# Patient Record
Sex: FEMALE | Race: BLACK OR AFRICAN AMERICAN | NOT HISPANIC OR LATINO | Employment: OTHER | URBAN - METROPOLITAN AREA
[De-identification: names, ages, dates, MRNs, and addresses within clinical notes are randomized per-mention and may not be internally consistent; named-entity substitution may affect disease eponyms.]

---

## 2018-05-17 ENCOUNTER — OFFICE VISIT (OUTPATIENT)
Dept: CARDIOLOGY | Facility: CLINIC | Age: 68
End: 2018-05-17
Payer: MEDICARE

## 2018-05-17 ENCOUNTER — CLINICAL SUPPORT (OUTPATIENT)
Dept: CARDIOLOGY | Facility: CLINIC | Age: 68
End: 2018-05-17
Payer: MEDICARE

## 2018-05-17 VITALS
HEIGHT: 64 IN | WEIGHT: 138 LBS | DIASTOLIC BLOOD PRESSURE: 74 MMHG | SYSTOLIC BLOOD PRESSURE: 148 MMHG | HEART RATE: 72 BPM | BODY MASS INDEX: 23.56 KG/M2

## 2018-05-17 DIAGNOSIS — E11.59 CONTROLLED TYPE 2 DIABETES MELLITUS WITH OTHER CIRCULATORY COMPLICATION, WITHOUT LONG-TERM CURRENT USE OF INSULIN: ICD-10-CM

## 2018-05-17 DIAGNOSIS — R00.2 PALPITATION: ICD-10-CM

## 2018-05-17 DIAGNOSIS — E78.5 HYPERLIPIDEMIA, UNSPECIFIED HYPERLIPIDEMIA TYPE: ICD-10-CM

## 2018-05-17 DIAGNOSIS — I10 ESSENTIAL HYPERTENSION: ICD-10-CM

## 2018-05-17 DIAGNOSIS — I10 ESSENTIAL HYPERTENSION: Primary | ICD-10-CM

## 2018-05-17 PROCEDURE — 99999 PR PBB SHADOW E&M-NEW PATIENT-LVL III: CPT | Mod: PBBFAC,,, | Performed by: INTERNAL MEDICINE

## 2018-05-17 PROCEDURE — 99204 OFFICE O/P NEW MOD 45 MIN: CPT | Mod: S$GLB,,, | Performed by: INTERNAL MEDICINE

## 2018-05-17 PROCEDURE — 3077F SYST BP >= 140 MM HG: CPT | Mod: CPTII,S$GLB,, | Performed by: INTERNAL MEDICINE

## 2018-05-17 PROCEDURE — 3078F DIAST BP <80 MM HG: CPT | Mod: CPTII,S$GLB,, | Performed by: INTERNAL MEDICINE

## 2018-05-17 PROCEDURE — 93000 ELECTROCARDIOGRAM COMPLETE: CPT | Mod: S$GLB,,, | Performed by: INTERNAL MEDICINE

## 2018-05-17 PROCEDURE — 99499 UNLISTED E&M SERVICE: CPT | Mod: S$GLB,,, | Performed by: INTERNAL MEDICINE

## 2018-05-17 RX ORDER — AMLODIPINE BESYLATE 10 MG/1
10 TABLET ORAL DAILY
Refills: 2 | COMMUNITY
Start: 2018-05-09 | End: 2019-12-06 | Stop reason: SDUPTHER

## 2018-05-17 RX ORDER — PRAVASTATIN SODIUM 20 MG/1
20 TABLET ORAL NIGHTLY
COMMUNITY
Start: 2018-05-13 | End: 2019-05-01 | Stop reason: SDUPTHER

## 2018-05-17 RX ORDER — LOSARTAN POTASSIUM 100 MG/1
100 TABLET ORAL
Refills: 1 | COMMUNITY
Start: 2018-03-14 | End: 2019-12-03 | Stop reason: SDUPTHER

## 2018-05-17 RX ORDER — LABETALOL 200 MG/1
200 TABLET, FILM COATED ORAL 2 TIMES DAILY
COMMUNITY
Start: 2018-05-14 | End: 2019-12-03 | Stop reason: SDUPTHER

## 2018-05-17 RX ORDER — CHOLECALCIFEROL (VITAMIN D3) 125 MCG
5000 CAPSULE ORAL DAILY
Refills: 1 | COMMUNITY
Start: 2018-02-15

## 2018-05-17 RX ORDER — METFORMIN HYDROCHLORIDE 500 MG/1
500 TABLET, EXTENDED RELEASE ORAL 2 TIMES DAILY
Refills: 1 | COMMUNITY
Start: 2018-03-14 | End: 2019-12-03 | Stop reason: SDUPTHER

## 2018-05-17 NOTE — LETTER
May 17, 2018      Sanjiv Sinha MD  [address] [facility]  [place] [address]           [facility] - Cardiology  [address]  [facility] [address]  Phone: [phone]  Fax: [phone]          Patient: Maryjane Mcintyre   MR Number: [id number]   YOB: 1950   Date of Visit: 5/17/2018       Dear Dr. Sanjiv Sinha:    Thank you for referring Maryjane Mcintyre to me for evaluation. Attached you will find relevant portions of my assessment and plan of care.    If you have questions, please do not hesitate to call me. I look forward to following Maryjane Mcintyre along with you.    Sincerely,    Candelario Black MD    Enclosure  CC:  No Recipients    If you would like to receive this communication electronically, please contact [email or web address] or [phone] to request more information on [employer] access.    For providers and/or their staff who would like to refer a patient to [facility], please contact us through our one-stop-shop provider referral line, [facility], at [phone].    If you feel you have received this communication in error or would no longer like to receive these types of communications, please e-mail [email or web address]

## 2018-05-17 NOTE — PROGRESS NOTES
Subjective:   Patient ID:  Maryjane Mcintyre is a 67 y.o. female who presents for evaluation of Abnormal ECG (per Dr. Sanjiv Sinha)      66 yo female, referred for irregulart heat beats  PMH HNT and HLD. smoking for 50 yrs  And 1/2 ppd. No drinking.   Dizziness if getting out of the bed or positional change  Occasional skipping beats, no faint, chest pain, dyspnea.  ekg NSR  Increased Labetalol recently and not took med this morning        Past Medical History:   Diagnosis Date    Hyperlipidemia     Hypertension        History reviewed. No pertinent surgical history.    Social History   Substance Use Topics    Smoking status: Current Every Day Smoker     Packs/day: 1.00     Types: Cigarettes    Smokeless tobacco: Never Used    Alcohol use No       Family History   Problem Relation Age of Onset    Diabetes Father     Hypertension Father     Heart attack Sister     Stroke Sister        Review of Systems   Constitution: Negative for decreased appetite, diaphoresis, fever, weakness, malaise/fatigue and night sweats.   HENT: Negative for nosebleeds.    Eyes: Negative for blurred vision and double vision.   Cardiovascular: Positive for palpitations. Negative for chest pain, claudication, dyspnea on exertion, irregular heartbeat, leg swelling, near-syncope, orthopnea, paroxysmal nocturnal dyspnea and syncope.   Respiratory: Negative for cough, shortness of breath, sleep disturbances due to breathing, snoring, sputum production and wheezing.    Endocrine: Negative for cold intolerance and polyuria.   Hematologic/Lymphatic: Does not bruise/bleed easily.   Skin: Negative for rash.   Musculoskeletal: Negative for back pain, falls, joint pain, joint swelling and neck pain.   Gastrointestinal: Negative for abdominal pain, heartburn, nausea and vomiting.   Genitourinary: Negative for dysuria, frequency and hematuria.   Neurological: Negative for difficulty with concentration, dizziness, focal weakness, headaches,  light-headedness, numbness and seizures.   Psychiatric/Behavioral: Negative for depression, memory loss and substance abuse. The patient does not have insomnia.    Allergic/Immunologic: Negative for HIV exposure and hives.       Objective:   Physical Exam   Constitutional: She is oriented to person, place, and time. She appears well-nourished.   HENT:   Head: Normocephalic.   Eyes: Pupils are equal, round, and reactive to light.   Neck: Normal carotid pulses and no JVD present. Carotid bruit is not present. No thyromegaly present.   Cardiovascular: Normal rate, regular rhythm, normal heart sounds and normal pulses.   No extrasystoles are present. PMI is not displaced.  Exam reveals no gallop and no S3.    No murmur heard.  Pulmonary/Chest: Breath sounds normal. No stridor. No respiratory distress.   Abdominal: Soft. Bowel sounds are normal. There is no tenderness. There is no rebound.   Musculoskeletal: Normal range of motion.   Neurological: She is alert and oriented to person, place, and time.   Skin: Skin is intact. No rash noted.   Psychiatric: Her behavior is normal.       No results found for: CHOL  No results found for: HDL  No results found for: LDLCALC  No results found for: TRIG  No results found for: CHOLHDL    Chemistry    No results found for: NA, K, CL, CO2, BUN, CREATININE, GLU No results found for: CALCIUM, ALKPHOS, AST, ALT, BILITOT, ESTGFRAFRICA, EGFRNONAA       No results found for: TSH  No results found for: INR, PROTIME  No results found for: WBC, HGB, HCT, MCV, PLT  BNP  @LABRCNTIP(BNP,BNPTRIAGEBLO)@  CrCl cannot be calculated (No order found.).     Assessment:      1. Essential hypertension    2. Hyperlipidemia, unspecified hyperlipidemia type    3. Controlled type 2 diabetes mellitus with other circulatory complication, without long-term current use of insulin    4. 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  Recommend heart-healthy diet, weight control and regular exercise.  Bijan. Risk modification.   RTC in 1 yr    I have reviewed all pertinent labs and cardiac studies. Plans and recommendations have been formulated under my direct supervision. All questions answered and patient voiced understanding. Patient to continue current medications.

## 2018-06-04 ENCOUNTER — TELEPHONE (OUTPATIENT)
Dept: CARDIOLOGY | Facility: CLINIC | Age: 68
End: 2018-06-04

## 2018-06-04 NOTE — TELEPHONE ENCOUNTER
Spoke with pt who was looking to see if Zio was approved.  Status showing authorized.      ----- Message from Marita Vargas sent at 6/4/2018  9:15 AM CDT -----  Contact: pt daughter  Calling in regards to with questions about last appointment on May 17th and please advise.  [phone]

## 2018-06-07 ENCOUNTER — TELEPHONE (OUTPATIENT)
Dept: CARDIOLOGY | Facility: CLINIC | Age: 68
End: 2018-06-07

## 2018-06-07 NOTE — TELEPHONE ENCOUNTER
Spoke with pt daughter who was calling to see if there would be a copay for the ZIO.  Pt was advised to call insurance carrier.    ----- Message from Ember Zazueta sent at 6/7/2018 11:47 AM CDT -----  Contact: Taurus Joyce/Daughter  Call Taurus [phone] Regarding authorization and other questions before tomorrow's appt

## 2018-06-08 ENCOUNTER — CLINICAL SUPPORT (OUTPATIENT)
Dept: CARDIOLOGY | Facility: CLINIC | Age: 68
End: 2018-06-08
Attending: INTERNAL MEDICINE
Payer: MEDICARE

## 2018-06-08 DIAGNOSIS — I10 ESSENTIAL HYPERTENSION: ICD-10-CM

## 2018-06-08 DIAGNOSIS — R00.2 PALPITATION: ICD-10-CM

## 2018-06-08 LAB
ESTIMATED PA SYSTOLIC PRESSURE: 26.04
RETIRED EF AND QEF - SEE NOTES: 55 (ref 55–65)

## 2018-06-08 PROCEDURE — 0296T HOLTER MONITOR - 3-14 DAY ADULT: CPT | Mod: S$GLB,,, | Performed by: NUCLEAR MEDICINE

## 2018-06-08 PROCEDURE — 93306 TTE W/DOPPLER COMPLETE: CPT | Mod: S$GLB,,, | Performed by: INTERNAL MEDICINE

## 2018-06-08 PROCEDURE — 0298T HOLTER MONITOR - 3-14 DAY ADULT: CPT | Mod: S$GLB,,, | Performed by: NUCLEAR MEDICINE

## 2018-06-11 ENCOUNTER — TELEPHONE (OUTPATIENT)
Dept: CARDIOLOGY | Facility: CLINIC | Age: 68
End: 2018-06-11

## 2018-06-13 ENCOUNTER — TELEPHONE (OUTPATIENT)
Dept: CARDIOLOGY | Facility: CLINIC | Age: 68
End: 2018-06-13

## 2018-06-13 NOTE — TELEPHONE ENCOUNTER
----- Message from Roberta Reed MA sent at 6/12/2018  4:50 PM CDT -----  I have attempted without success to contact this patient by phone left message for pt to call back.

## 2018-07-23 ENCOUNTER — TELEPHONE (OUTPATIENT)
Dept: CARDIOLOGY | Facility: CLINIC | Age: 68
End: 2018-07-23

## 2018-08-24 ENCOUNTER — PES CALL (OUTPATIENT)
Dept: ADMINISTRATIVE | Facility: CLINIC | Age: 68
End: 2018-08-24

## 2019-01-30 ENCOUNTER — OFFICE VISIT (OUTPATIENT)
Dept: PODIATRY | Facility: CLINIC | Age: 69
End: 2019-01-30
Payer: MEDICARE

## 2019-01-30 VITALS
HEART RATE: 70 BPM | SYSTOLIC BLOOD PRESSURE: 162 MMHG | WEIGHT: 147.69 LBS | BODY MASS INDEX: 25.21 KG/M2 | DIASTOLIC BLOOD PRESSURE: 77 MMHG | HEIGHT: 64 IN

## 2019-01-30 DIAGNOSIS — E11.51 CONTROLLED TYPE 2 DM WITH PERIPHERAL CIRCULATORY DISORDER: ICD-10-CM

## 2019-01-30 DIAGNOSIS — Z72.0 TOBACCO ABUSE: ICD-10-CM

## 2019-01-30 DIAGNOSIS — L60.2 NAIL DISORDER (ONYCHOGRYPHOSIS): Primary | ICD-10-CM

## 2019-01-30 DIAGNOSIS — I77.1 ARTERIAL INSUFFICIENCY: ICD-10-CM

## 2019-01-30 DIAGNOSIS — L84 CORN OR CALLUS: ICD-10-CM

## 2019-01-30 PROCEDURE — 99203 OFFICE O/P NEW LOW 30 MIN: CPT | Mod: 25,S$GLB,, | Performed by: PODIATRIST

## 2019-01-30 PROCEDURE — 3288F PR FALLS RISK ASSESSMENT DOCUMENTED: ICD-10-PCS | Mod: CPTII,S$GLB,, | Performed by: PODIATRIST

## 2019-01-30 PROCEDURE — 11056 PR TRIM BENIGN HYPERKERATOTIC SKIN LESION,2-4: ICD-10-PCS | Mod: Q8,S$GLB,, | Performed by: PODIATRIST

## 2019-01-30 PROCEDURE — 99999 PR PBB SHADOW E&M-EST. PATIENT-LVL III: CPT | Mod: PBBFAC,,, | Performed by: PODIATRIST

## 2019-01-30 PROCEDURE — 1100F PTFALLS ASSESS-DOCD GE2>/YR: CPT | Mod: CPTII,S$GLB,, | Performed by: PODIATRIST

## 2019-01-30 PROCEDURE — 3077F PR MOST RECENT SYSTOLIC BLOOD PRESSURE >= 140 MM HG: ICD-10-PCS | Mod: CPTII,S$GLB,, | Performed by: PODIATRIST

## 2019-01-30 PROCEDURE — 99999 PR PBB SHADOW E&M-EST. PATIENT-LVL III: ICD-10-PCS | Mod: PBBFAC,,, | Performed by: PODIATRIST

## 2019-01-30 PROCEDURE — 11721 PR DEBRIDEMENT OF NAILS, 6 OR MORE: ICD-10-PCS | Mod: 59,Q8,S$GLB, | Performed by: PODIATRIST

## 2019-01-30 PROCEDURE — 3078F DIAST BP <80 MM HG: CPT | Mod: CPTII,S$GLB,, | Performed by: PODIATRIST

## 2019-01-30 PROCEDURE — 3077F SYST BP >= 140 MM HG: CPT | Mod: CPTII,S$GLB,, | Performed by: PODIATRIST

## 2019-01-30 PROCEDURE — 99203 PR OFFICE/OUTPT VISIT, NEW, LEVL III, 30-44 MIN: ICD-10-PCS | Mod: 25,S$GLB,, | Performed by: PODIATRIST

## 2019-01-30 PROCEDURE — 3288F FALL RISK ASSESSMENT DOCD: CPT | Mod: CPTII,S$GLB,, | Performed by: PODIATRIST

## 2019-01-30 PROCEDURE — 11056 PARNG/CUTG B9 HYPRKR LES 2-4: CPT | Mod: Q8,S$GLB,, | Performed by: PODIATRIST

## 2019-01-30 PROCEDURE — 11721 DEBRIDE NAIL 6 OR MORE: CPT | Mod: 59,Q8,S$GLB, | Performed by: PODIATRIST

## 2019-01-30 PROCEDURE — 3078F PR MOST RECENT DIASTOLIC BLOOD PRESSURE < 80 MM HG: ICD-10-PCS | Mod: CPTII,S$GLB,, | Performed by: PODIATRIST

## 2019-01-30 PROCEDURE — 1100F PR PT FALLS ASSESS DOC 2+ FALLS/FALL W/INJURY/YR: ICD-10-PCS | Mod: CPTII,S$GLB,, | Performed by: PODIATRIST

## 2019-01-30 NOTE — PROGRESS NOTES
[facility]  PODIATRIC MEDICINE AND SURGERY      CHIEF COMPLAINT  Chief Complaint   Patient presents with    Nail Problem     Nail pain rated at 7/10    PCP: Dr. Sinha, Last Visit: 1/21/19      HPI    SUBJECTIVE: Maryjane Mcintyre is a 68 y.o. female who  has a past medical history of Hyperlipidemia and Hypertension. Joycepresents to clinic for high risk diabetic foot exam and care.  Maryjane denies numbness, burning, and/or tingling sensations in their feet. Patient relates blood sugars normally run around  mg/dl. Pt has established care with primary care physician and would like to establish care with a podiatric physician. She complains about thickened, elongated, painful toenails.  She admits to history of tobacco abuse.  She is currently now smoking half a pack of cigarettes daily.  She is accompanied with her daughter PI does wishes to quit.  She admits to leg cramps at night while sleeping.  Patient has no other pedal complaints at this time      HgA1c: No results found for: HGBA1C      PMH  Past Medical History:   Diagnosis Date    Hyperlipidemia     Hypertension        MEDS  Current Outpatient Medications on File Prior to Visit   Medication Sig Dispense Refill    amLODIPine (NORVASC) 10 MG tablet Take 10 mg by mouth once daily.  2    cholecalciferol, vitamin D3, 5,000 unit capsule Take 5,000 Units by mouth once daily.  1    labetalol (NORMODYNE) 200 MG tablet Take 200 mg by mouth 2 (two) times daily.      losartan (COZAAR) 100 MG tablet Take 100 mg by mouth as needed.  1    metFORMIN (GLUCOPHAGE-XR) 500 MG 24 hr tablet Take 500 mg by mouth 2 (two) times daily.  1    pravastatin (PRAVACHOL) 20 MG tablet Take 20 mg by mouth every evening.       No current facility-administered medications on file prior to visit.        PSH   No past surgical history on file.     ALL  Review of patient's allergies indicates:  No Known Allergies    SOC     Social History     Tobacco Use    Smoking  "status: Current Every Day Smoker     Packs/day: 1.00     Types: Cigarettes    Smokeless tobacco: Never Used   Substance Use Topics    Alcohol use: No    Drug use: Not on file         Family HX    Family History   Problem Relation Age of Onset    Diabetes Father     Hypertension Father     Heart attack Sister     Stroke Sister             REVIEW OF SYSTEMS  General: Denies any fever or chills  Chest: Denies shortness of breath, wheezing, coughing, or sputum production  Heart: Denies chest pain.  As noted above and per history of current illness above, otherwise negative in the remainder of the 14 systems.     PHYSICAL EXAM  Vitals:    01/30/19 0900   BP: (!) 162/77   Pulse: 70   Weight: 67 kg (147 lb 11.3 oz)   Height: 5' 4" (1.626 m)   PainSc:   7       GEN:  This patient is well-developed, well-nourished and appears stated age, well-oriented to person, place and time, and cooperative and pleasant on today's visit.      LOWER EXTREMITY PHYSICAL EXAMINATION   VASCULAR  DP pedal pulse 1/4 RIGHT, LEFT1/4   PT pedal pulse 2/4 RIGHT, LEFT2/4  Capillary refill time immediate to the toes.   Feet are warm to the touch. Skin temperature warm to warm from proximally to distally   There are no varicosities, telangiectasias noted to bilateral foot and ankle regions.   There are no ecchymoses noted to bilateral foot and ankle regions.   There is no gross lower extremity edema.    DERMATOLOGIC  Skin moist with healthy texture and turgor.  Thickened, dystrophic, elongated toenails with subungal debris 1-5 b/l   There are no open ulcerations, lacerations, or fissures to bilateral foot and ankle regions. There are no signs of infection as there is no erythema, no proximal-extending lymphangiitis, no fluctuance, or crepitus noted on palpation to bilateral foot and ankle regions.   There is no interdigital maceration.   There are hyperkeratotic lesions noted to feet distal tip RIGHT second digit and sub 5th metatarsal " head    NEUROLOGIC  Protective sensation intact at 10/10 sites upon examination with New Site Weinsten 5.07 g monofilament.  Propioception intact at 1st MTPJ b/l.  Achilles and patellar deep tendon reflexes intact  Babinski reflex absent    ORTHOPEDIC/BIOMECHANICAL  No symptomatic structural abnormalities noted  Muscle strength AT/EHL/EDL/PT: 5/5; Achilles/Gastroc/Soleus: 5/5; PB/PL: 5/5 Muscle tone is normal.  Ankle joint ROM limited DF/PF, non-crepitus  STJ ROM  inv/ev, non crepitus       ASSESSMENT  Encounter Diagnoses   Name Primary?    Nail disorder (onychogryphosis) Yes    Arterial insufficiency     Controlled type 2 DM with peripheral circulatory disorder     Tobacco abuse          Plan:  -Initial patient evaluation with H&P was performed  -Discuss presenting problems, etiology, pathologic processes and management options with patient today.   -I counseled the patient on their conditions, their implications and medical management. An in depth discussion on diabetic management, risk prevention, amputation prevention verbally and provided educational literature in written format.    -With patient's permission, nails were aggressively reduced and debrided x 10 to their soft tissue attachment mechanically and with electric , removing all offending nail and debris. Patient relates relief following the procedure. -With patient's permission via verbal consent, the involved area was cleansed with an alcohol swab. Trimming of hyperkeratotic lesions deep to epidermal layer x 2 was performed with a #15 blade without incident.   - Shoe inspection. Diabetic Foot Education. Patient reminded of the importance of good nutrition and blood sugar control to help prevent podiatric complications of diabetes. Patient instructed on proper foot hygeine. We discussed wearing proper shoe gear, daily foot inspections, never walking without protective shoe gear, never putting sharp instruments to feet, routine podiatric visits  annually/semi annually or sooner if symptoms arise    Disclaimer: This note was partially prepared using a voice recognition system and is likely to have sound alike errors within the text.        Future Appointments   Date Time Provider Department Center   2/4/2019  1:15 PM [facility] [place]       Report Electronically Signed By:     Allie Cartagena DPM   Podiatry  [facility]  1/30/2019

## 2019-01-30 NOTE — PATIENT INSTRUCTIONS
Your Proactive Measures   You play a vital role in reducing complications. Follow these guidelines and contact your foot and ankle surgeon if you notice any problems:     1. Inspect your feet daily. If your eyesight is poor, have someone else do it for you. Inspect for:   Skin or nail problems: Look for cuts, scrapes, redness, drainage, swelling, bad odor, rash, discoloration, loss of hair on toes, injuries, or nail changes (deformed, striped, yellowed or discolored, thickened, or not growing).   Signs of fracture: If your foot is swollen, red, hot, or has changed in size, shape, or direction, see your foot and ankle surgeon immediately.   2. Dont ignore leg pain. Pain in the leg that occurs at night or with a little activity could mean you have a blocked artery. Seek care immediately.   3. Nail cutting. If you have any nail problems, hard nails, or reduced feeling in your feet, your toenails should be properly trimmed.   4. No bathroom surgery. Never trim calluses or corns yourself, and dont use over-the-counter medicated pads.   5. Keep floors free of sharp objects. Make sure there are no needles, insulin syringes, or other sharp objects on the floor.   6. Dont go barefoot. Wear shoes, indoors and outdoors.   7. Check shoes and socks. Shake out your shoes before putting them on. Make sure your socks arent bunched up.   8. Have your circulation and sense of feeling tested. Your foot and ankle surgeon will perform tests to see if youve lost any feeling or circulation.       Diabetic Peripheral Neuropathy   What is Diabetic Peripheral Neuropathy?  Diabetic neuropathy is nerve damage caused by diabetes. When it affects the arms, hands, legs and feet it is known as diabetic peripheral neuropathy. Diabetic peripheral neuropathy is different from peripheral arterial disease (poor circulation), which affects the blood vessels rather than the nerves.   Three different groups of nerves can be affected by diabetic  neuropathy:   Sensory nerves, which enable people to feel pain, temperature, and other sensations   Motor nerves, which control the muscles and give them their strength and tone   Autonomic nerves, which allow the body to perform certain involuntary functions, such as sweating.   Diabetic peripheral neuropathy doesnt emerge overnight. Instead, it usually develops slowly and worsens over time. Some patients have this condition long before they are diagnosed with diabetes. Having diabetes for several years may increase the likelihood of having diabetic neuropathy.   The loss of sensation and other problems associated with nerve damage make a patient prone to developing skin ulcers (open sores) that can become infected and may not heal. This serious complication of diabetes can lead to loss of a foot, a leg, or even a life.     Causes  The nerve damage that characterizes diabetic peripheral neuropathy is more common in patients with poorly managed diabetes. However, even diabetic patients who have excellent blood sugar (glucose) control can develop diabetic neuropathy. There are several theories as to why this occurs, including the possibilities that high blood glucose or constricted blood vessels produce damage to the nerves.   As diabetic peripheral neuropathy progresses, various nerves are affected. These damaged nerves can cause problems that encourage development of ulcers. For example:   Motor Neuropathy (Deformity)    +    Ill-fitting shoes    +    Sensory Neuropathy (numbness)    =    Ulcers (sores)    Deformities (such as bunions or hammertoes) resulting from motor neuropathy may cause shoes to rub against toes, creating a sore. The numbness caused by sensory neuropathy can make the patient unaware that this is happening.   Because of numbness, a patient may not realize that he or she has stepped on a small object and cut the skin.   Cracked skin caused by autonomic neuropathy, combined with sensory  neuropathys numbness and problems associated with motor neuropathy can lead to developing a sore.     Symptoms  Depending on the type(s) of nerves involved, one or more symptoms may be present in diabetic peripheral neuropathy.     For sensory neuropathy:   Numbness or tingling in the feet   Pain or discomfort in the feet or legs, including prickly, sharp pain or burning feet     For motor neuropathy:   Muscle weakness and loss of muscle tone in the feet and lower legs   Loss of balance   Changes in foot shape that can lead to areas of increased pressure     For autonomic neuropathy:   Dry feet   Cracked skin     Diagnosis  To diagnose diabetic peripheral neuropathy, the foot and ankle surgeon will obtain the patients history of symptoms and will perform simple in-office tests on the feet and legs. This evaluation may include assessment of the patients reflexes, ability to feel light touch, and ability to feel vibration. In some cases, additional neurologic tests may be ordered.     Treatment  First and foremost, treatment of diabetic peripheral neuropathy centers on control of the patients blood sugar level. In addition, various options are used to treat the painful symptoms.   Medications are available to help relieve specific symptoms, such as tingling or burning. Sometimes a combination of different medications is used.   In some cases, the patient may also undergo physical therapy to help reduce balance problems or other symptoms .    Prevention  The patient plays a vital role in minimizing the risk of developing diabetic peripheral neuropathy and in preventing its possible consequences. Some important preventive measures include:   Keep blood sugar levels under control.   Wear well-fitting shoes to avoid getting sores.   Inspect your feet every day. If you notice any cuts, redness, blisters, or swelling, see your foot and ankle surgeon right away. This can prevent problems from becoming worse.   Visit your  foot and ankle surgeon on a regular basis for an examination to help prevent the foot complications of diabetes.   Have periodic visits with your primary care physician or endocrinologist. The foot and ankle surgeon works together with these and other providers to prevent and treat complications from diabetes.     Diabetic Complications and Amputation Prevention   People with diabetes are prone to having foot problems, often because of two complications of diabetes: nerve damage (neuropathy) and poor circulation. Neuropathy causes loss of feeling in your feet, taking away your ability to feel pain and discomfort, so you may not detect an injury or irritation. Poor circulation in your feet reduces your ability to heal, making it hard for even a tiny cut to resist infection.   Having diabetes increases the risk of developing a wide range of foot problems. Furthermore, with diabetes, small foot problems can turn into serious complications.     When Is Amputation Necessary?  Even with preventative care and prompt treatment of infection and complications, there are instances when amputation is necessary to remove infected tissue, save a limb, or even save a life.     Diabetes-related Foot and Leg Problems     Infections and ulcers (sores) that dont heal. An ulcer is a sore in the skin that may go all the way to the bone. Because of poor circulation and neuropathy in the feet, cuts or blisters can easily turn into ulcers that become infected and wont heal. This is a common - and serious - complication of diabetes and can lead to a loss of your foot, your leg, or your life.   Corns and calluses. When neuropathy is present, you cant tell if your shoes are causing pressure and producing corns or calluses. Corns and calluses must be properly treated or they can develop into ulcers.   Dry, cracked skin. Poor circulation and neuropathy can make your skin dry. This may seem harmless, but dry skin can result in cracks that may  become sores and can lead to infection.   Nail disorders. Ingrown toenails (which curve into the skin on the sides of the nail) and fungal infections can go unnoticed because of loss of feeling. If they are not properly treated, they can lead to infection.   Hammertoes and bunions. Nerve damage affecting muscles can cause muscle weakness and loss of tone in the feet, resulting in hammertoes and bunions. If left untreated, these deformities can cause ulcers.   Charcot foot. This is a complex foot deformity. It develops as a result of loss of sensation and an undetected broken bone that leads to destruction of the soft tissue of the foot. Because of neuropathy, the pain of the fracture goes unnoticed and the patient continues to walk on the broken bone, making it worse. This disabling complication is so severe that surgery, and occasionally amputation, may become necessary.   Poor blood flow. In diabetes, the blood vessels below the knee often become narrow and restrict blood flow. This prevents wounds from healing and may cause tissue death.     What Your Foot and Ankle Surgeon Can Do  Your foot and ankle surgeon can help wounds heal, preventing amputation. There are many new surgical techniques available to save feet and legs, including joint reconstruction and wound healing technologies. Getting regular foot checkups and seeking immediate help when you notice something can keep small problems from worsening. Your foot and ankle surgeon works together with other health care providers to prevent and treat complications from diabetes.

## 2019-02-11 ENCOUNTER — HOSPITAL ENCOUNTER (OUTPATIENT)
Dept: RADIOLOGY | Facility: HOSPITAL | Age: 69
Discharge: HOME | End: 2019-02-11
Attending: PODIATRIST
Payer: MEDICARE

## 2019-02-11 DIAGNOSIS — I77.1 ARTERIAL INSUFFICIENCY: ICD-10-CM

## 2019-02-11 PROCEDURE — 93925 LOWER EXTREMITY STUDY: CPT | Mod: 26,,, | Performed by: RADIOLOGY

## 2019-02-11 PROCEDURE — 93925 US LOWER EXTREMITY ARTERIES BILATERAL: ICD-10-PCS | Mod: 26,,, | Performed by: RADIOLOGY

## 2019-02-11 PROCEDURE — 93925 LOWER EXTREMITY STUDY: CPT | Mod: TC

## 2019-02-12 DIAGNOSIS — I77.1 ARTERIAL INSUFFICIENCY: Primary | ICD-10-CM

## 2019-03-11 DIAGNOSIS — I73.9 PVD (PERIPHERAL VASCULAR DISEASE): ICD-10-CM

## 2019-03-11 DIAGNOSIS — I10 HYPERTENSION, UNSPECIFIED TYPE: Primary | ICD-10-CM

## 2019-03-12 ENCOUNTER — CLINICAL SUPPORT (OUTPATIENT)
Dept: CARDIOLOGY | Facility: CLINIC | Age: 69
End: 2019-03-12
Payer: MEDICARE

## 2019-03-12 ENCOUNTER — OFFICE VISIT (OUTPATIENT)
Dept: CARDIOLOGY | Facility: CLINIC | Age: 69
End: 2019-03-12
Payer: MEDICARE

## 2019-03-12 VITALS
BODY MASS INDEX: 25.56 KG/M2 | SYSTOLIC BLOOD PRESSURE: 140 MMHG | HEIGHT: 64 IN | WEIGHT: 149.69 LBS | DIASTOLIC BLOOD PRESSURE: 74 MMHG | HEART RATE: 67 BPM

## 2019-03-12 DIAGNOSIS — E78.2 MIXED HYPERLIPIDEMIA: ICD-10-CM

## 2019-03-12 DIAGNOSIS — F17.200 SMOKER: ICD-10-CM

## 2019-03-12 DIAGNOSIS — I73.9 PAD (PERIPHERAL ARTERY DISEASE): Primary | ICD-10-CM

## 2019-03-12 DIAGNOSIS — I10 ESSENTIAL HYPERTENSION: ICD-10-CM

## 2019-03-12 DIAGNOSIS — I73.9 PVD (PERIPHERAL VASCULAR DISEASE): ICD-10-CM

## 2019-03-12 DIAGNOSIS — M71.22 BAKER CYST, LEFT: ICD-10-CM

## 2019-03-12 DIAGNOSIS — I10 HYPERTENSION, UNSPECIFIED TYPE: ICD-10-CM

## 2019-03-12 PROCEDURE — 99999 PR PBB SHADOW E&M-EST. PATIENT-LVL III: ICD-10-PCS | Mod: PBBFAC,,, | Performed by: INTERNAL MEDICINE

## 2019-03-12 PROCEDURE — 3078F PR MOST RECENT DIASTOLIC BLOOD PRESSURE < 80 MM HG: ICD-10-PCS | Mod: CPTII,S$GLB,, | Performed by: INTERNAL MEDICINE

## 2019-03-12 PROCEDURE — 99214 OFFICE O/P EST MOD 30 MIN: CPT | Mod: S$GLB,,, | Performed by: INTERNAL MEDICINE

## 2019-03-12 PROCEDURE — 99999 PR PBB SHADOW E&M-EST. PATIENT-LVL III: CPT | Mod: PBBFAC,,, | Performed by: INTERNAL MEDICINE

## 2019-03-12 PROCEDURE — 3077F PR MOST RECENT SYSTOLIC BLOOD PRESSURE >= 140 MM HG: ICD-10-PCS | Mod: CPTII,S$GLB,, | Performed by: INTERNAL MEDICINE

## 2019-03-12 PROCEDURE — 93000 EKG 12-LEAD: ICD-10-PCS | Mod: S$GLB,,, | Performed by: INTERNAL MEDICINE

## 2019-03-12 PROCEDURE — 99214 PR OFFICE/OUTPT VISIT, EST, LEVL IV, 30-39 MIN: ICD-10-PCS | Mod: S$GLB,,, | Performed by: INTERNAL MEDICINE

## 2019-03-12 PROCEDURE — 1100F PR PT FALLS ASSESS DOC 2+ FALLS/FALL W/INJURY/YR: ICD-10-PCS | Mod: CPTII,S$GLB,, | Performed by: INTERNAL MEDICINE

## 2019-03-12 PROCEDURE — 99499 UNLISTED E&M SERVICE: CPT | Mod: S$GLB,,, | Performed by: INTERNAL MEDICINE

## 2019-03-12 PROCEDURE — 93000 ELECTROCARDIOGRAM COMPLETE: CPT | Mod: S$GLB,,, | Performed by: INTERNAL MEDICINE

## 2019-03-12 PROCEDURE — 1100F PTFALLS ASSESS-DOCD GE2>/YR: CPT | Mod: CPTII,S$GLB,, | Performed by: INTERNAL MEDICINE

## 2019-03-12 PROCEDURE — 3288F PR FALLS RISK ASSESSMENT DOCUMENTED: ICD-10-PCS | Mod: CPTII,S$GLB,, | Performed by: INTERNAL MEDICINE

## 2019-03-12 PROCEDURE — 3077F SYST BP >= 140 MM HG: CPT | Mod: CPTII,S$GLB,, | Performed by: INTERNAL MEDICINE

## 2019-03-12 PROCEDURE — 3288F FALL RISK ASSESSMENT DOCD: CPT | Mod: CPTII,S$GLB,, | Performed by: INTERNAL MEDICINE

## 2019-03-12 PROCEDURE — 3078F DIAST BP <80 MM HG: CPT | Mod: CPTII,S$GLB,, | Performed by: INTERNAL MEDICINE

## 2019-03-12 PROCEDURE — 99499 RISK ADDL DX/OHS AUDIT: ICD-10-PCS | Mod: S$GLB,,, | Performed by: INTERNAL MEDICINE

## 2019-03-12 NOTE — LETTER
March 12, 2019      Allie Cartagena, DPVALENTINO  [address]  [place] [address]           [facility] Cardiology  [address] [address]  Phone: [phone]  Fax: [phone]          Patient: Maryjane Mcintyre   MR Number: [id number]   YOB: 1950   Date of Visit: 3/12/2019       Dear Dr. Allie Cartagena:    Thank you for referring Maryjane Mcintyre to me for evaluation. Attached you will find relevant portions of my assessment and plan of care.    If you have questions, please do not hesitate to call me. I look forward to following Marjyane Mcintyre along with you.    Sincerely,    Candelario Black MD    Enclosure  CC:  No Recipients    If you would like to receive this communication electronically, please contact [email or web address] or [phone] to request more information on [employer] Link access.    For providers and/or their staff who would like to refer a patient to [facility], please contact us through our one-stop-shop provider referral line, [facility], at [phone].    If you feel you have received this communication in error or would no longer like to receive these types of communications, please e-mail [email or web address]

## 2019-03-12 NOTE — PROGRESS NOTES
Subjective:   Patient ID:  Maryjane Mcintyre is a 68 y.o. female who presents for follow up of Results (US legs) and Hypertension      66 yo female, referred for PAD.  PMH HTN,PAD, and HLD. smoking for 50 yrs  And 1/2 ppd. No drinking.   Echo in  normal EF, LAE and LVH  Holter no arrhythmia  ekg today NSR and LVH with st change    Recent c/o knee pain, L> R. Occurred at work. No calf pain and lower back pain.  Arterial US showed 50 to 80% lesions and left Baker's cyst. _+plauqe            Past Medical History:   Diagnosis Date    Hyperlipidemia     Hypertension        No past surgical history on file.    Social History     Tobacco Use    Smoking status: Current Every Day Smoker     Packs/day: 1.00     Types: Cigarettes    Smokeless tobacco: Never Used   Substance Use Topics    Alcohol use: No    Drug use: Not on file       Family History   Problem Relation Age of Onset    Diabetes Father     Hypertension Father     Heart attack Sister     Stroke Sister          Review of Systems   Constitution: Negative for decreased appetite, diaphoresis, fever, weakness, malaise/fatigue and night sweats.   HENT: Negative for nosebleeds.    Eyes: Negative for blurred vision and double vision.   Cardiovascular: Negative for chest pain, claudication, dyspnea on exertion, irregular heartbeat, leg swelling, near-syncope, orthopnea, palpitations, paroxysmal nocturnal dyspnea and syncope.   Respiratory: Negative for cough, shortness of breath, sleep disturbances due to breathing, snoring, sputum production and wheezing.    Endocrine: Negative for cold intolerance and polyuria.   Hematologic/Lymphatic: Does not bruise/bleed easily.   Skin: Negative for rash.   Musculoskeletal: Positive for arthritis and joint pain. Negative for back pain, falls, joint swelling and neck pain.   Gastrointestinal: Negative for abdominal pain, heartburn, nausea and vomiting.   Genitourinary: Negative for dysuria, frequency and hematuria.    Neurological: Negative for difficulty with concentration, dizziness, focal weakness, headaches, light-headedness, numbness and seizures.   Psychiatric/Behavioral: Negative for depression, memory loss and substance abuse. The patient does not have insomnia.    Allergic/Immunologic: Negative for HIV exposure and hives.       Objective:   Physical Exam   Constitutional: She is oriented to person, place, and time. She appears well-nourished.   HENT:   Head: Normocephalic.   Eyes: Pupils are equal, round, and reactive to light.   Neck: Normal carotid pulses and no JVD present. Carotid bruit is not present. No thyromegaly present.   Cardiovascular: Normal rate, regular rhythm, normal heart sounds and normal pulses.  No extrasystoles are present. PMI is not displaced. Exam reveals no gallop and no S3.   No murmur heard.  Pulmonary/Chest: No stridor. No respiratory distress. She has rhonchi.   Abdominal: Soft. Bowel sounds are normal. There is no tenderness. There is no rebound.   Musculoskeletal: Normal range of motion.   Neurological: She is alert and oriented to person, place, and time.   Skin: Skin is intact. No rash noted.   Psychiatric: Her behavior is normal.       No results found for: CHOL  No results found for: HDL  No results found for: LDLCALC  No results found for: TRIG  No results found for: CHOLHDL    Chemistry    No results found for: NA, K, CL, CO2, BUN, CREATININE, GLU No results found for: CALCIUM, ALKPHOS, AST, ALT, BILITOT, ESTGFRAFRICA, EGFRNONAA       No results found for: LABA1C, HGBA1C  No results found for: TSH  No results found for: INR, PROTIME  No results found for: WBC, HGB, HCT, MCV, PLT  BMP  No results found for: NA, K, CL, CO2, BUN, CREATININE, CALCIUM, ANIONGAP, ESTGFRAFRICA, EGFRNONAA  BNP  @LABRCNTIP(BNP,BNPTRIAGEBLO)@  @LABRCNTIP(troponini)@  CrCl cannot be calculated (No order found.).  No results found in the last 24 hours.  No results found in the last 24 hours.  No results found  in the last 24 hours.    Assessment:      1. PAD (peripheral artery disease)    2. Baker cyst, left    3. Essential hypertension    4. Smoker    5. Mixed hyperlipidemia      Exertional knee pain L>R  Arterial US showed left baker' cyst and moderate PAD.  Plan:   Exercise DIMITRIOS ordered  Smoking cessation  Check Lipid profile  Continue ARB, BB and amlodipine  Continue Pravastatin 20 mg daily now  Continue current meds.  Recommend heart-healthy diet, weight control and regular exercise.  Bijan. Risk modification.   RTC in 1 yr    I have reviewed all pertinent labs and cardiac studies. Plans and recommendations have been formulated under my direct supervision. All questions answered and patient voiced understanding. Patient to continue current medications.

## 2019-03-27 ENCOUNTER — TELEPHONE (OUTPATIENT)
Dept: CARDIOLOGY | Facility: CLINIC | Age: 69
End: 2019-03-27

## 2019-03-27 NOTE — TELEPHONE ENCOUNTER
----- Message from Erika Griffith sent at 3/27/2019 10:39 AM CDT -----  Contact: Pt daugther   Pt daughter is calling ..Type:  Needs Medical Advice    Who Called: Pt daughter  concerning upcoming appt    Would the patient rather a call back or a response via [facility]? Call back   Best Call Back Number: [phone]       .Thank You  Sheridan Griffith

## 2019-04-25 ENCOUNTER — TELEPHONE (OUTPATIENT)
Dept: CARDIOLOGY | Facility: CLINIC | Age: 69
End: 2019-04-25

## 2019-04-25 NOTE — TELEPHONE ENCOUNTER
Spoke with Susan, patient's daughter. She request to change lab locations, in order to arrive on time for 9 am appointment with CARDIOVASCULAR on 4/26/19. She questioned should the patient fast for her lipid labs on 4/26/2019 also. She was informed to have the patient fast and both appointments were at [facility] location.    ----- Message from Micheal Calzada sent at 4/25/2019  1:38 PM CDT -----  Contact: daughter-susan  Type:  Needs Medical Advice    Who Called: daughter  Symptoms (please be specific): n/a   How long has patient had these symptoms:n/a  Pharmacy name and phone #:n/a  Would the patient rather a call back or a response via [facility]? Call back  Best Call Back Number: [phone]  Additional Information: requesting call back regarding questions on misunderstanding regarding pt labs on tomorrow.    Thanks,  Micheal Calzada

## 2019-04-26 ENCOUNTER — HOSPITAL ENCOUNTER (OUTPATIENT)
Dept: CARDIOLOGY | Facility: CLINIC | Age: 69
Discharge: HOME | End: 2019-04-26
Attending: INTERNAL MEDICINE
Payer: MEDICARE

## 2019-04-26 DIAGNOSIS — F17.200 SMOKER: ICD-10-CM

## 2019-04-26 DIAGNOSIS — I73.9 PAD (PERIPHERAL ARTERY DISEASE): ICD-10-CM

## 2019-04-26 PROCEDURE — 93924 CAR ANKLE BRACHIAL INDICES W STRESS: ICD-10-PCS | Mod: S$GLB,,, | Performed by: INTERNAL MEDICINE

## 2019-04-26 PROCEDURE — 93924 LWR XTR VASC STDY BILAT: CPT | Mod: S$GLB,,, | Performed by: INTERNAL MEDICINE

## 2019-04-30 ENCOUNTER — TELEPHONE (OUTPATIENT)
Dept: CARDIOLOGY | Facility: CLINIC | Age: 69
End: 2019-04-30

## 2019-04-30 LAB — VASCULAR ANKLE BRACHIAL INDEX (ABI) RIGHT: 0.81 (ref 0.9–1.2)

## 2019-04-30 NOTE — TELEPHONE ENCOUNTER
I have attempted without success to contact this patient by phone to advise her that LDL 99.   Continue current Rx.  Left message to call office.

## 2019-05-01 ENCOUNTER — TELEPHONE (OUTPATIENT)
Dept: CARDIOLOGY | Facility: CLINIC | Age: 69
End: 2019-05-01

## 2019-05-01 DIAGNOSIS — E78.2 MIXED HYPERLIPIDEMIA: ICD-10-CM

## 2019-05-01 DIAGNOSIS — I73.9 PAD (PERIPHERAL ARTERY DISEASE): Primary | ICD-10-CM

## 2019-05-01 RX ORDER — PRAVASTATIN SODIUM 80 MG/1
80 TABLET ORAL NIGHTLY
Qty: 30 TABLET | Refills: 5 | Status: SHIPPED | OUTPATIENT
Start: 2019-05-01 | End: 2020-02-03 | Stop reason: SDUPTHER

## 2019-05-01 RX ORDER — CILOSTAZOL 50 MG/1
50 TABLET ORAL 2 TIMES DAILY
Qty: 60 TABLET | Refills: 11 | Status: SHIPPED | OUTPATIENT
Start: 2019-05-01 | End: 2020-05-07

## 2019-05-02 ENCOUNTER — TELEPHONE (OUTPATIENT)
Dept: CARDIOLOGY | Facility: CLINIC | Age: 69
End: 2019-05-02

## 2019-05-02 NOTE — TELEPHONE ENCOUNTER
Spoke with pts daughter and let her know pt has significant PAD, add Pletal, increase Pravistatin to 80mg and scheduled labs and follow up.

## 2019-05-02 NOTE — TELEPHONE ENCOUNTER
Spoke with pts daughter and let her know pt has significant PAD, add Pletal, increase Pravistatin to 80mg and scheduled labs and follow up.    ----- Message from Candelario Black MD sent at 5/1/2019  8:50 PM CDT -----  DIMITRIOS showed significant right leg PAD.  Add Pletal   Increase Pravastatin to 80mg   Check Lipid profile and CMP in 6 weeks  RTC in 3 months

## 2019-05-02 NOTE — TELEPHONE ENCOUNTER
DIMITRIOS showed significant right leg PAD.  Add Pletal   Increase Pravastatin to 80mg   Check Lipid profile and CMP in 6 weeks  RTC in 3 months

## 2019-06-20 ENCOUNTER — LAB VISIT (OUTPATIENT)
Dept: LAB | Facility: HOSPITAL | Age: 69
End: 2019-06-20
Attending: INTERNAL MEDICINE
Payer: MEDICARE

## 2019-06-20 DIAGNOSIS — I73.9 PAD (PERIPHERAL ARTERY DISEASE): ICD-10-CM

## 2019-06-20 DIAGNOSIS — E78.2 MIXED HYPERLIPIDEMIA: ICD-10-CM

## 2019-06-20 LAB
ALBUMIN SERPL BCP-MCNC: 4 G/DL (ref 3.5–5.2)
ALP SERPL-CCNC: 54 U/L (ref 55–135)
ALT SERPL W/O P-5'-P-CCNC: 21 U/L (ref 10–44)
ANION GAP SERPL CALC-SCNC: 8 MMOL/L (ref 8–16)
AST SERPL-CCNC: 15 U/L (ref 10–40)
BILIRUB SERPL-MCNC: 0.5 MG/DL (ref 0.1–1)
BUN SERPL-MCNC: 8 MG/DL (ref 8–23)
CALCIUM SERPL-MCNC: 9.3 MG/DL (ref 8.7–10.5)
CHLORIDE SERPL-SCNC: 105 MMOL/L (ref 95–110)
CHOLEST SERPL-MCNC: 115 MG/DL (ref 120–199)
CHOLEST/HDLC SERPL: 2.9 {RATIO} (ref 2–5)
CO2 SERPL-SCNC: 25 MMOL/L (ref 23–29)
CREAT SERPL-MCNC: 0.9 MG/DL (ref 0.5–1.4)
EST. GFR  (AFRICAN AMERICAN): >60 ML/MIN/1.73 M^2
EST. GFR  (NON AFRICAN AMERICAN): >60 ML/MIN/1.73 M^2
GLUCOSE SERPL-MCNC: 292 MG/DL (ref 70–110)
HDLC SERPL-MCNC: 40 MG/DL (ref 40–75)
HDLC SERPL: 34.8 % (ref 20–50)
LDLC SERPL CALC-MCNC: 62.2 MG/DL (ref 63–159)
NONHDLC SERPL-MCNC: 75 MG/DL
POTASSIUM SERPL-SCNC: 3.6 MMOL/L (ref 3.5–5.1)
PROT SERPL-MCNC: 6.7 G/DL (ref 6–8.4)
SODIUM SERPL-SCNC: 138 MMOL/L (ref 136–145)
TRIGL SERPL-MCNC: 64 MG/DL (ref 30–150)

## 2019-06-20 PROCEDURE — 80061 LIPID PANEL: CPT

## 2019-06-20 PROCEDURE — 80053 COMPREHEN METABOLIC PANEL: CPT | Mod: PO

## 2019-06-20 PROCEDURE — 36415 COLL VENOUS BLD VENIPUNCTURE: CPT | Mod: PO

## 2019-06-28 ENCOUNTER — TELEPHONE (OUTPATIENT)
Dept: CARDIOLOGY | Facility: CLINIC | Age: 69
End: 2019-06-28

## 2019-06-28 NOTE — TELEPHONE ENCOUNTER
Spoke with patient's daughter, gave results of labs. Answered all questions. Verbalized understanding.

## 2019-08-16 ENCOUNTER — OFFICE VISIT (OUTPATIENT)
Dept: CARDIOLOGY | Facility: CLINIC | Age: 69
End: 2019-08-16
Payer: MEDICARE

## 2019-08-16 VITALS
BODY MASS INDEX: 25.45 KG/M2 | HEIGHT: 64 IN | DIASTOLIC BLOOD PRESSURE: 77 MMHG | WEIGHT: 149.06 LBS | HEART RATE: 82 BPM | SYSTOLIC BLOOD PRESSURE: 152 MMHG

## 2019-08-16 DIAGNOSIS — E78.2 MIXED HYPERLIPIDEMIA: ICD-10-CM

## 2019-08-16 DIAGNOSIS — I73.9 PAD (PERIPHERAL ARTERY DISEASE): ICD-10-CM

## 2019-08-16 DIAGNOSIS — F17.200 SMOKER: ICD-10-CM

## 2019-08-16 DIAGNOSIS — I10 ESSENTIAL HYPERTENSION: Primary | ICD-10-CM

## 2019-08-16 DIAGNOSIS — E11.51 CONTROLLED TYPE 2 DM WITH PERIPHERAL CIRCULATORY DISORDER: ICD-10-CM

## 2019-08-16 PROCEDURE — 1100F PTFALLS ASSESS-DOCD GE2>/YR: CPT | Mod: CPTII,S$GLB,, | Performed by: INTERNAL MEDICINE

## 2019-08-16 PROCEDURE — 3288F FALL RISK ASSESSMENT DOCD: CPT | Mod: CPTII,S$GLB,, | Performed by: INTERNAL MEDICINE

## 2019-08-16 PROCEDURE — 99214 OFFICE O/P EST MOD 30 MIN: CPT | Mod: S$GLB,,, | Performed by: INTERNAL MEDICINE

## 2019-08-16 PROCEDURE — 99999 PR PBB SHADOW E&M-EST. PATIENT-LVL III: CPT | Mod: PBBFAC,,, | Performed by: INTERNAL MEDICINE

## 2019-08-16 PROCEDURE — 3077F PR MOST RECENT SYSTOLIC BLOOD PRESSURE >= 140 MM HG: ICD-10-PCS | Mod: CPTII,S$GLB,, | Performed by: INTERNAL MEDICINE

## 2019-08-16 PROCEDURE — 99999 PR PBB SHADOW E&M-EST. PATIENT-LVL III: ICD-10-PCS | Mod: PBBFAC,,, | Performed by: INTERNAL MEDICINE

## 2019-08-16 PROCEDURE — 3078F PR MOST RECENT DIASTOLIC BLOOD PRESSURE < 80 MM HG: ICD-10-PCS | Mod: CPTII,S$GLB,, | Performed by: INTERNAL MEDICINE

## 2019-08-16 PROCEDURE — 3078F DIAST BP <80 MM HG: CPT | Mod: CPTII,S$GLB,, | Performed by: INTERNAL MEDICINE

## 2019-08-16 PROCEDURE — 99499 RISK ADDL DX/OHS AUDIT: ICD-10-PCS | Mod: S$GLB,,, | Performed by: INTERNAL MEDICINE

## 2019-08-16 PROCEDURE — 99214 PR OFFICE/OUTPT VISIT, EST, LEVL IV, 30-39 MIN: ICD-10-PCS | Mod: S$GLB,,, | Performed by: INTERNAL MEDICINE

## 2019-08-16 PROCEDURE — 99499 UNLISTED E&M SERVICE: CPT | Mod: S$GLB,,, | Performed by: INTERNAL MEDICINE

## 2019-08-16 PROCEDURE — 3077F SYST BP >= 140 MM HG: CPT | Mod: CPTII,S$GLB,, | Performed by: INTERNAL MEDICINE

## 2019-08-16 PROCEDURE — 1100F PR PT FALLS ASSESS DOC 2+ FALLS/FALL W/INJURY/YR: ICD-10-PCS | Mod: CPTII,S$GLB,, | Performed by: INTERNAL MEDICINE

## 2019-08-16 PROCEDURE — 3288F PR FALLS RISK ASSESSMENT DOCUMENTED: ICD-10-PCS | Mod: CPTII,S$GLB,, | Performed by: INTERNAL MEDICINE

## 2019-08-16 RX ORDER — HYDROCHLOROTHIAZIDE 12.5 MG/1
12.5 TABLET ORAL DAILY
Qty: 30 TABLET | Refills: 5 | Status: SHIPPED | OUTPATIENT
Start: 2019-08-16 | End: 2020-02-03 | Stop reason: SDUPTHER

## 2019-08-16 NOTE — PROGRESS NOTES
Subjective:   Patient ID:  Maryjane Mcintyre is a 69 y.o. female who presents for follow up of Follow-up      68 yo female, referred for PAD.  PMH HTN, PAD, and HLD. smoking for 50 yrs, quit now. No drinking.   Echo in  normal EF, LAE and LVH  Holter no arrhythmia  ekg today NSR and LVH with st change       Arterial US 50-75% stenosis in the right proximal femoral artery and left popliteal artery. And left Baker's cyst. _+plauqe   DIMITRIOS  Right 0.81 and left 0.87  C/oknee pain, L> R. Occurred at work. No calf pain and lower back pain.  No leg weakness, resting pain and coldness.  No chest pain, palpitation, faint and orthopnea.  FREDERICK chronic        Past Medical History:   Diagnosis Date    Hyperlipidemia     Hypertension        History reviewed. No pertinent surgical history.    Social History     Tobacco Use    Smoking status: Current Every Day Smoker     Packs/day: 1.00     Types: Cigarettes    Smokeless tobacco: Never Used   Substance Use Topics    Alcohol use: No    Drug use: Not on file       Family History   Problem Relation Age of Onset    Diabetes Father     Hypertension Father     Heart attack Sister     Stroke Sister          Review of Systems   Constitution: Negative for decreased appetite, diaphoresis, fever, malaise/fatigue and night sweats.   HENT: Negative for nosebleeds.    Eyes: Negative for blurred vision and double vision.   Cardiovascular: Negative for chest pain, claudication, dyspnea on exertion, irregular heartbeat, leg swelling, near-syncope, orthopnea, palpitations, paroxysmal nocturnal dyspnea and syncope.   Respiratory: Negative for cough, shortness of breath, sleep disturbances due to breathing, snoring, sputum production and wheezing.    Endocrine: Negative for cold intolerance and polyuria.   Hematologic/Lymphatic: Does not bruise/bleed easily.   Skin: Negative for rash.   Musculoskeletal: Positive for arthritis and joint pain. Negative for back pain, falls, joint  swelling and neck pain.   Gastrointestinal: Negative for abdominal pain, heartburn, nausea and vomiting.   Genitourinary: Negative for dysuria, frequency and hematuria.   Neurological: Negative for difficulty with concentration, dizziness, focal weakness, headaches, light-headedness, numbness, seizures and weakness.   Psychiatric/Behavioral: Negative for depression, memory loss and substance abuse. The patient does not have insomnia.    Allergic/Immunologic: Negative for HIV exposure and hives.       Objective:   Physical Exam   Constitutional: She is oriented to person, place, and time. She appears well-nourished.   HENT:   Head: Normocephalic.   Eyes: Pupils are equal, round, and reactive to light.   Neck: Normal carotid pulses and no JVD present. Carotid bruit is not present. No thyromegaly present.   Cardiovascular: Normal rate, regular rhythm and normal pulses.  No extrasystoles are present. PMI is not displaced. Exam reveals no gallop and no S3.   Murmur (ESM 2/6 on RUSB and mid LSB) heard.  Pulmonary/Chest: No stridor. No respiratory distress. She has rhonchi.   Abdominal: Soft. Bowel sounds are normal. There is no tenderness. There is no rebound.   Musculoskeletal: Normal range of motion.   Neurological: She is alert and oriented to person, place, and time.   Skin: Skin is intact. No rash noted.   Psychiatric: Her behavior is normal.       Lab Results   Component Value Date    CHOL 115 (L) 06/20/2019    CHOL 154 04/26/2019     Lab Results   Component Value Date    HDL 40 06/20/2019    HDL 35 (L) 04/26/2019     Lab Results   Component Value Date    LDLCALC 62.2 (L) 06/20/2019    LDLCALC 99.4 04/26/2019     Lab Results   Component Value Date    TRIG 64 06/20/2019    TRIG 98 04/26/2019     Lab Results   Component Value Date    CHOLHDL 34.8 06/20/2019    CHOLHDL 22.7 04/26/2019       Chemistry        Component Value Date/Time     06/20/2019 0830    K 3.6 06/20/2019 0830     06/20/2019 0830    CO2 25  06/20/2019 0830    BUN 8 06/20/2019 0830    CREATININE 0.9 06/20/2019 0830     (H) 06/20/2019 0830        Component Value Date/Time    CALCIUM 9.3 06/20/2019 0830    ALKPHOS 54 (L) 06/20/2019 0830    AST 15 06/20/2019 0830    ALT 21 06/20/2019 0830    BILITOT 0.5 06/20/2019 0830    ESTGFRAFRICA >60.0 06/20/2019 0830    EGFRNONAA >60.0 06/20/2019 0830          No results found for: LABA1C, HGBA1C  No results found for: TSH  No results found for: INR, PROTIME  No results found for: WBC, HGB, HCT, MCV, PLT  BMP  Sodium   Date Value Ref Range Status   06/20/2019 138 136 - 145 mmol/L Final     Potassium   Date Value Ref Range Status   06/20/2019 3.6 3.5 - 5.1 mmol/L Final     Chloride   Date Value Ref Range Status   06/20/2019 105 95 - 110 mmol/L Final     CO2   Date Value Ref Range Status   06/20/2019 25 23 - 29 mmol/L Final     BUN, Bld   Date Value Ref Range Status   06/20/2019 8 8 - 23 mg/dL Final     Creatinine   Date Value Ref Range Status   06/20/2019 0.9 0.5 - 1.4 mg/dL Final     Calcium   Date Value Ref Range Status   06/20/2019 9.3 8.7 - 10.5 mg/dL Final     Anion Gap   Date Value Ref Range Status   06/20/2019 8 8 - 16 mmol/L Final     eGFR if    Date Value Ref Range Status   06/20/2019 >60.0 >60 mL/min/1.73 m^2 Final     eGFR if non    Date Value Ref Range Status   06/20/2019 >60.0 >60 mL/min/1.73 m^2 Final     Comment:     Calculation used to obtain the estimated glomerular filtration  rate (eGFR) is the CKD-EPI equation.        BNP  @LABRCNTIP(BNP,BNPTRIAGEBLO)@  @LABRCNTIP(troponini)@  CrCl cannot be calculated (Patient's most recent lab result is older than the maximum 7 days allowed.).  No results found in the last 24 hours.  No results found in the last 24 hours.  No results found in the last 24 hours.    Assessment:      1. Essential hypertension    2. Mixed hyperlipidemia    3. PAD (peripheral artery disease)    4. Controlled type 2 DM with peripheral circulatory  disorder    5. Smoker      Bp high, not taking med this morning  BS high  LDL wnl  No claudication  Quit smoking    Plan:   Add HCTZ 12.5 mg daIly  DASH  Continue Pletal, amlodipine, BB, ARb and statin  F/u with pcp for DM Rx  Recommend heart-healthy diet, weight control and regular exercise.  Bijan. Risk modification.   I have reviewed all pertinent labs and cardiac studies. Plans and recommendations have been formulated under my direct supervision. All questions answered and patient voiced understanding. Patient to continue current medications.   rtc in 1 yr

## 2019-09-25 ENCOUNTER — PES CALL (OUTPATIENT)
Dept: ADMINISTRATIVE | Facility: CLINIC | Age: 69
End: 2019-09-25

## 2019-11-14 LAB — BCS RECOMMENDATION EXT: NORMAL

## 2019-12-03 PROBLEM — M19.90 ARTHRITIS: Status: ACTIVE | Noted: 2019-12-03

## 2019-12-03 PROBLEM — M10.9 GOUT: Status: ACTIVE | Noted: 2019-12-03

## 2019-12-03 PROBLEM — B35.1 ONYCHOMYCOSIS: Status: ACTIVE | Noted: 2019-12-03

## 2020-02-05 RX ORDER — HYDROCHLOROTHIAZIDE 12.5 MG/1
TABLET ORAL
Qty: 30 TABLET | Refills: 11 | Status: SHIPPED | OUTPATIENT
Start: 2020-02-05 | End: 2020-06-03 | Stop reason: SDUPTHER

## 2020-05-07 RX ORDER — CILOSTAZOL 50 MG/1
TABLET ORAL
Qty: 60 TABLET | Refills: 11 | Status: SHIPPED | OUTPATIENT
Start: 2020-05-07 | End: 2020-06-03 | Stop reason: SDUPTHER

## 2020-06-11 ENCOUNTER — OFFICE VISIT (OUTPATIENT)
Dept: PODIATRY | Facility: CLINIC | Age: 70
End: 2020-06-11
Payer: MEDICARE

## 2020-06-11 VITALS
WEIGHT: 158.31 LBS | DIASTOLIC BLOOD PRESSURE: 74 MMHG | BODY MASS INDEX: 29.13 KG/M2 | SYSTOLIC BLOOD PRESSURE: 131 MMHG | HEIGHT: 62 IN | HEART RATE: 76 BPM

## 2020-06-11 DIAGNOSIS — L60.2 NAIL DISORDER (ONYCHOGRYPHOSIS): ICD-10-CM

## 2020-06-11 DIAGNOSIS — E11.51 CONTROLLED TYPE 2 DM WITH PERIPHERAL CIRCULATORY DISORDER: Primary | ICD-10-CM

## 2020-06-11 DIAGNOSIS — L84 CORN OR CALLUS: ICD-10-CM

## 2020-06-11 DIAGNOSIS — I77.1 ARTERIAL INSUFFICIENCY: ICD-10-CM

## 2020-06-11 PROCEDURE — 11056 PARNG/CUTG B9 HYPRKR LES 2-4: CPT | Mod: Q8,S$GLB,, | Performed by: PODIATRIST

## 2020-06-11 PROCEDURE — 11721 DEBRIDE NAIL 6 OR MORE: CPT | Mod: Q8,59,S$GLB, | Performed by: PODIATRIST

## 2020-06-11 PROCEDURE — 99499 UNLISTED E&M SERVICE: CPT | Mod: S$GLB,,, | Performed by: PODIATRIST

## 2020-06-11 PROCEDURE — 11056 PR TRIM BENIGN HYPERKERATOTIC SKIN LESION,2-4: ICD-10-PCS | Mod: Q8,S$GLB,, | Performed by: PODIATRIST

## 2020-06-11 PROCEDURE — 99999 PR PBB SHADOW E&M-EST. PATIENT-LVL III: ICD-10-PCS | Mod: PBBFAC,,, | Performed by: PODIATRIST

## 2020-06-11 PROCEDURE — 99999 PR PBB SHADOW E&M-EST. PATIENT-LVL III: CPT | Mod: PBBFAC,,, | Performed by: PODIATRIST

## 2020-06-11 PROCEDURE — 99499 NO LOS: ICD-10-PCS | Mod: S$GLB,,, | Performed by: PODIATRIST

## 2020-06-11 PROCEDURE — 11721 PR DEBRIDEMENT OF NAILS, 6 OR MORE: ICD-10-PCS | Mod: Q8,59,S$GLB, | Performed by: PODIATRIST

## 2020-06-11 NOTE — PROGRESS NOTES
"  [facility] -   PODIATRIC MEDICINE AND SURGERY      CHIEF COMPLAINT  Chief Complaint   Patient presents with    Routine Foot Care     PCP  06/03/20 3 mo [facility]     HPI    SUBJECTIVE: Maryjane Mcintyre is a 69 y.o. female who  has a past medical history of Arthritis, DM II (diabetes mellitus, type II), controlled, Gout, Hyperlipidemia, Hypertension, and Onychomycosis. Janeycepresents to clinic for high risk diabetic foot exam and care.  Maryjane denies numbness, burning, and/or tingling sensations in their feet. Patient relates blood sugars normally run around  mg/dl. Pt has established care with primary care physician and would like to establish care with a podiatric physician. She complains about thickened, elongated, painful toenails.  She admits to history of tobacco abuse.  She is currently now smoking half a pack of cigarettes daily.  She is accompanied with her daughter PI does wishes to quit.  She admits to leg cramps at night while sleeping.  Patient has no other pedal complaints at this time      HgA1c:   Hemoglobin A1C   Date Value Ref Range Status   06/03/2020 8.9 % Final   02/03/2020 8.0 % Final       REVIEW OF SYSTEMS  General: Denies any fever or chills  Chest: Denies shortness of breath, wheezing, coughing, or sputum production  Heart: Denies chest pain.  As noted above and per history of current illness above, otherwise negative in the remainder of the 14 systems.     PHYSICAL EXAM  Vitals:    06/11/20 1330   BP: 131/74   Pulse: 76   Weight: 71.8 kg (158 lb 4.6 oz)   Height: 5' 2" (1.575 m)       GEN:  This patient is well-developed, well-nourished and appears stated age, well-oriented to person, place and time, and cooperative and pleasant on today's visit.      LOWER EXTREMITY PHYSICAL EXAMINATION   VASCULAR  DP pedal pulse 1/4 RIGHT, LEFT1/4   PT pedal pulse 2/4 RIGHT, LEFT2/4  Capillary refill time immediate to the toes.   Feet are warm to the touch. Skin temperature warm to " warm from proximally to distally   There are no varicosities, telangiectasias noted to bilateral foot and ankle regions.   There are no ecchymoses noted to bilateral foot and ankle regions.   There is no gross lower extremity edema.    DERMATOLOGIC  Skin moist with healthy texture and turgor.  Thickened, dystrophic, elongated toenails with subungal debris 1-5 b/l   There are no open ulcerations, lacerations, or fissures to bilateral foot and ankle regions. There are no signs of infection as there is no erythema, no proximal-extending lymphangiitis, no fluctuance, or crepitus noted on palpation to bilateral foot and ankle regions.   There is no interdigital maceration.   There are hyperkeratotic lesions noted to feet distal tip RIGHT second digit and sub 5th metatarsal head    NEUROLOGIC  Protective sensation intact at 10/10 sites upon examination with Loretto Weinsten 5.07 g monofilament.  Propioception intact at 1st MTPJ b/l.  Achilles and patellar deep tendon reflexes intact  Babinski reflex absent    ORTHOPEDIC/BIOMECHANICAL  No symptomatic structural abnormalities noted  Muscle strength AT/EHL/EDL/PT: 5/5; Achilles/Gastroc/Soleus: 5/5; PB/PL: 5/5 Muscle tone is normal.  Ankle joint ROM limited DF/PF, non-crepitus  STJ ROM  inv/ev, non crepitus       ASSESSMENT  Encounter Diagnoses   Name Primary?    Controlled type 2 DM with peripheral circulatory disorder Yes    Arterial insufficiency     Nail disorder (onychogryphosis)          Plan:  -Discuss presenting problems, etiology, pathologic processes and management options with patient today.   -I counseled the patient on their conditions, their implications and medical management. An in depth discussion on diabetic management, risk prevention, amputation prevention verbally and provided educational literature in written format.    -With patient's permission, nails were aggressively reduced and debrided x 10 to their soft tissue attachment mechanically and with  electric , removing all offending nail and debris. Patient relates relief following the procedure. -With patient's permission via verbal consent, the involved area was cleansed with an alcohol swab. Trimming of hyperkeratotic lesions deep to epidermal layer x 2 was performed with a #15 blade without incident.     - Shoe inspection. Diabetic Foot Education. Patient reminded of the importance of good nutrition and blood sugar control to help prevent podiatric complications of diabetes. Patient instructed on proper foot hygeine. We discussed wearing proper shoe gear, daily foot inspections, never walking without protective shoe gear, never putting sharp instruments to feet, routine podiatric visits annually/semi annually or sooner if symptoms arise    Disclaimer: This note was partially prepared using a voice recognition system and is likely to have sound alike errors within the text.        Future Appointments   Date Time Provider Department [place]   9/3/2020  8:30 AM Sanjiv Sinha MD [facility]   9/10/2020  9:15 AM Allie Cartagena DPM HG POD [facility]   9/11/2020  8:30 AM Candelario Black MD [facility]       Report Electronically Signed By:     Allie Cartagena DPM   Podiatry  [facility]-   6/11/2020

## 2020-06-26 LAB — HEPATITIS C QUANTITATIVE RNA: NORMAL

## 2020-09-10 DIAGNOSIS — I73.9 PAD (PERIPHERAL ARTERY DISEASE): Primary | ICD-10-CM

## 2020-09-11 ENCOUNTER — HOSPITAL ENCOUNTER (OUTPATIENT)
Dept: CARDIOLOGY | Facility: HOSPITAL | Age: 70
Discharge: HOME | End: 2020-09-11
Attending: INTERNAL MEDICINE
Payer: MEDICARE

## 2020-09-11 ENCOUNTER — OFFICE VISIT (OUTPATIENT)
Dept: CARDIOLOGY | Facility: CLINIC | Age: 70
End: 2020-09-11
Payer: MEDICARE

## 2020-09-11 VITALS
DIASTOLIC BLOOD PRESSURE: 71 MMHG | OXYGEN SATURATION: 98 % | HEART RATE: 69 BPM | WEIGHT: 157.88 LBS | BODY MASS INDEX: 28.87 KG/M2 | SYSTOLIC BLOOD PRESSURE: 134 MMHG

## 2020-09-11 DIAGNOSIS — E78.2 MIXED HYPERLIPIDEMIA: ICD-10-CM

## 2020-09-11 DIAGNOSIS — I73.9 PAD (PERIPHERAL ARTERY DISEASE): Primary | ICD-10-CM

## 2020-09-11 DIAGNOSIS — I73.9 PAD (PERIPHERAL ARTERY DISEASE): ICD-10-CM

## 2020-09-11 DIAGNOSIS — I10 ESSENTIAL HYPERTENSION: ICD-10-CM

## 2020-09-11 DIAGNOSIS — E11.51 CONTROLLED TYPE 2 DM WITH PERIPHERAL CIRCULATORY DISORDER: ICD-10-CM

## 2020-09-11 PROCEDURE — 1126F AMNT PAIN NOTED NONE PRSNT: CPT | Mod: S$GLB,,, | Performed by: INTERNAL MEDICINE

## 2020-09-11 PROCEDURE — 99214 PR OFFICE/OUTPT VISIT, EST, LEVL IV, 30-39 MIN: ICD-10-PCS | Mod: S$GLB,,, | Performed by: INTERNAL MEDICINE

## 2020-09-11 PROCEDURE — 1101F PR PT FALLS ASSESS DOC 0-1 FALLS W/OUT INJ PAST YR: ICD-10-PCS | Mod: CPTII,S$GLB,, | Performed by: INTERNAL MEDICINE

## 2020-09-11 PROCEDURE — 3008F PR BODY MASS INDEX (BMI) DOCUMENTED: ICD-10-PCS | Mod: CPTII,S$GLB,, | Performed by: INTERNAL MEDICINE

## 2020-09-11 PROCEDURE — 3075F SYST BP GE 130 - 139MM HG: CPT | Mod: CPTII,S$GLB,, | Performed by: INTERNAL MEDICINE

## 2020-09-11 PROCEDURE — 3052F PR MOST RECENT HEMOGLOBIN A1C LEVEL 8.0 - < 9.0%: ICD-10-PCS | Mod: CPTII,S$GLB,, | Performed by: INTERNAL MEDICINE

## 2020-09-11 PROCEDURE — 1159F PR MEDICATION LIST DOCUMENTED IN MEDICAL RECORD: ICD-10-PCS | Mod: S$GLB,,, | Performed by: INTERNAL MEDICINE

## 2020-09-11 PROCEDURE — 3078F PR MOST RECENT DIASTOLIC BLOOD PRESSURE < 80 MM HG: ICD-10-PCS | Mod: CPTII,S$GLB,, | Performed by: INTERNAL MEDICINE

## 2020-09-11 PROCEDURE — 99999 PR PBB SHADOW E&M-EST. PATIENT-LVL III: ICD-10-PCS | Mod: PBBFAC,,, | Performed by: INTERNAL MEDICINE

## 2020-09-11 PROCEDURE — 3008F BODY MASS INDEX DOCD: CPT | Mod: CPTII,S$GLB,, | Performed by: INTERNAL MEDICINE

## 2020-09-11 PROCEDURE — 99214 OFFICE O/P EST MOD 30 MIN: CPT | Mod: S$GLB,,, | Performed by: INTERNAL MEDICINE

## 2020-09-11 PROCEDURE — 93010 ELECTROCARDIOGRAM REPORT: CPT | Mod: ,,, | Performed by: INTERNAL MEDICINE

## 2020-09-11 PROCEDURE — 3052F HG A1C>EQUAL 8.0%<EQUAL 9.0%: CPT | Mod: CPTII,S$GLB,, | Performed by: INTERNAL MEDICINE

## 2020-09-11 PROCEDURE — 3078F DIAST BP <80 MM HG: CPT | Mod: CPTII,S$GLB,, | Performed by: INTERNAL MEDICINE

## 2020-09-11 PROCEDURE — 1101F PT FALLS ASSESS-DOCD LE1/YR: CPT | Mod: CPTII,S$GLB,, | Performed by: INTERNAL MEDICINE

## 2020-09-11 PROCEDURE — 1126F PR PAIN SEVERITY QUANTIFIED, NO PAIN PRESENT: ICD-10-PCS | Mod: S$GLB,,, | Performed by: INTERNAL MEDICINE

## 2020-09-11 PROCEDURE — 1159F MED LIST DOCD IN RCRD: CPT | Mod: S$GLB,,, | Performed by: INTERNAL MEDICINE

## 2020-09-11 PROCEDURE — 93010 EKG 12-LEAD: ICD-10-PCS | Mod: ,,, | Performed by: INTERNAL MEDICINE

## 2020-09-11 PROCEDURE — 93005 ELECTROCARDIOGRAM TRACING: CPT | Mod: PO

## 2020-09-11 PROCEDURE — 99999 PR PBB SHADOW E&M-EST. PATIENT-LVL III: CPT | Mod: PBBFAC,,, | Performed by: INTERNAL MEDICINE

## 2020-09-11 PROCEDURE — 3075F PR MOST RECENT SYSTOLIC BLOOD PRESS GE 130-139MM HG: ICD-10-PCS | Mod: CPTII,S$GLB,, | Performed by: INTERNAL MEDICINE

## 2020-09-11 NOTE — PROGRESS NOTES
Subjective:   Patient ID:  Maryjane Mcintyre is a 70 y.o. female who presents for follow up of No chief complaint on file.      71 yo female, 1 yr f/u  PMH HTN, PAD, and HLD. smoking for 50 yrs, quit now. No drinking.   Echo in  normal EF, LAE and LVH  Holter no arrhythmia  ekg today NSR and nonspecific STT change   LE arterial US 50-75% stenosis in the right proximal femoral artery and left popliteal artery. And left Thompson's cyst. +plauqe   DIMITRIOS  Right 0.81 and left 0.87  C/oknee pain, L> R. Occurred at walk. No calf pain and lower back pain.  No leg weakness, resting pain and coldness.  No chest pain, palpitation, faint and orthopnea.  FREDERICK chronic  BP LDL and A1c conreolled         Past Medical History:   Diagnosis Date    Arthritis     DM II (diabetes mellitus, type II), controlled     Gout     Hyperlipidemia     Hypertension     Onychomycosis        Past Surgical History:   Procedure Laterality Date    Cardiac Echo      GALLBLADDER SURGERY      HYSTERECTOMY         Social History     Tobacco Use    Smoking status: Former Smoker     Packs/day: 1.00     Types: Cigarettes    Smokeless tobacco: Never Used    Tobacco comment: Quit 2019 mid year   Substance Use Topics    Alcohol use: No    Drug use: Never       Family History   Problem Relation Age of Onset    Diabetes Father     Hypertension Father     Heart attack Sister     Stroke Sister          Review of Systems   Constitution: Negative for decreased appetite, diaphoresis, fever, malaise/fatigue and night sweats.   HENT: Negative for nosebleeds.    Eyes: Negative for blurred vision and double vision.   Cardiovascular: Negative for chest pain, claudication, dyspnea on exertion, irregular heartbeat, leg swelling, near-syncope, orthopnea, palpitations, paroxysmal nocturnal dyspnea and syncope.   Respiratory: Negative for cough, shortness of breath, sleep disturbances due to breathing, snoring, sputum production and wheezing.     Endocrine: Negative for cold intolerance and polyuria.   Hematologic/Lymphatic: Does not bruise/bleed easily.   Skin: Negative for rash.   Musculoskeletal: Positive for arthritis and joint pain. Negative for back pain, falls, joint swelling and neck pain.   Gastrointestinal: Negative for abdominal pain, heartburn, nausea and vomiting.   Genitourinary: Negative for dysuria, frequency and hematuria.   Neurological: Negative for difficulty with concentration, dizziness, focal weakness, headaches, light-headedness, numbness, seizures and weakness.   Psychiatric/Behavioral: Negative for depression, memory loss and substance abuse. The patient does not have insomnia.    Allergic/Immunologic: Negative for HIV exposure and hives.       Objective:   Physical Exam   Constitutional: She is oriented to person, place, and time. She appears well-nourished.   HENT:   Head: Normocephalic.   Eyes: Pupils are equal, round, and reactive to light.   Neck: Normal carotid pulses and no JVD present. Carotid bruit is not present. No thyromegaly present.   Cardiovascular: Normal rate, regular rhythm and normal pulses.  No extrasystoles are present. PMI is not displaced. Exam reveals no gallop and no S3.   Murmur (ESM 2/6 on RUSB and mid LSB) heard.  Pulmonary/Chest: No stridor. No respiratory distress. She has rhonchi.   Abdominal: Soft. Bowel sounds are normal. There is no abdominal tenderness. There is no rebound.   Musculoskeletal: Normal range of motion.   Neurological: She is alert and oriented to person, place, and time.   Skin: Skin is intact. No rash noted.   Psychiatric: Her behavior is normal.       Lab Results   Component Value Date    CHOL 115 (L) 06/20/2019    CHOL 154 04/26/2019     Lab Results   Component Value Date    HDL 40 06/20/2019    HDL 35 (L) 04/26/2019     Lab Results   Component Value Date    LDLCALC 62.2 (L) 06/20/2019    LDLCALC 99.4 04/26/2019     Lab Results   Component Value Date    TRIG 64 06/20/2019    TRIG  98 04/26/2019     Lab Results   Component Value Date    CHOLHDL 34.8 06/20/2019    CHOLHDL 22.7 04/26/2019       Chemistry        Component Value Date/Time     06/20/2019 0830    K 3.6 06/20/2019 0830     06/20/2019 0830    CO2 25 06/20/2019 0830    BUN 8 06/20/2019 0830    CREATININE 0.9 06/20/2019 0830     (H) 06/20/2019 0830        Component Value Date/Time    CALCIUM 9.3 06/20/2019 0830    ALKPHOS 54 (L) 06/20/2019 0830    AST 15 06/20/2019 0830    ALT 21 06/20/2019 0830    BILITOT 0.5 06/20/2019 0830    ESTGFRAFRICA >60.0 06/20/2019 0830    EGFRNONAA >60.0 06/20/2019 0830          Lab Results   Component Value Date    HGBA1C 8.9 06/03/2020     No results found for: TSH  No results found for: INR, PROTIME  No results found for: WBC, HGB, HCT, MCV, PLT  BMP  Sodium   Date Value Ref Range Status   06/20/2019 138 136 - 145 mmol/L Final     Potassium   Date Value Ref Range Status   06/20/2019 3.6 3.5 - 5.1 mmol/L Final     Chloride   Date Value Ref Range Status   06/20/2019 105 95 - 110 mmol/L Final     CO2   Date Value Ref Range Status   06/20/2019 25 23 - 29 mmol/L Final     BUN, Bld   Date Value Ref Range Status   06/20/2019 8 8 - 23 mg/dL Final     Creatinine   Date Value Ref Range Status   06/20/2019 0.9 0.5 - 1.4 mg/dL Final     Calcium   Date Value Ref Range Status   06/20/2019 9.3 8.7 - 10.5 mg/dL Final     Anion Gap   Date Value Ref Range Status   06/20/2019 8 8 - 16 mmol/L Final     eGFR if    Date Value Ref Range Status   06/20/2019 >60.0 >60 mL/min/1.73 m^2 Final     eGFR if non    Date Value Ref Range Status   06/20/2019 >60.0 >60 mL/min/1.73 m^2 Final     Comment:     Calculation used to obtain the estimated glomerular filtration  rate (eGFR) is the CKD-EPI equation.        BNP  @LABRCNTIP(BNP,BNPTRIAGEBLO)@  @LABRCNTIP(troponini)@  CrCl cannot be calculated (Patient's most recent lab result is older than the maximum 7 days allowed.).  No results found  in the last 24 hours.  No results found in the last 24 hours.  No results found in the last 24 hours.    Assessment:      1. PAD (peripheral artery disease)    2. Mixed hyperlipidemia    3. Essential hypertension    4. Controlled type 2 DM with peripheral circulatory disorder        Plan:   Continue Pletal 50 mg bid, amlodipine HCTZ BB Losartan and pravasttion  DM Rx per PCP  Counseled DASH  Check Lipid profile  Recommend heart-healthy diet, weight control and regular exercise.  Bijan. Risk modification.   RTC in 12 months    I have reviewed all pertinent labs and cardiac studies independently. Plans and recommendations have been formulated under my direct supervision. All questions answered and patient voiced understanding.   If symptoms persist go to the ED

## 2020-09-15 ENCOUNTER — TELEPHONE (OUTPATIENT)
Dept: CARDIOLOGY | Facility: CLINIC | Age: 70
End: 2020-09-15

## 2020-09-15 NOTE — TELEPHONE ENCOUNTER
Patient contacted, LVM to return call to the clinic      ----- Message from Candelario Black MD sent at 9/14/2020 10:27 AM CDT -----  Lab showed lipid controlled

## 2020-09-24 ENCOUNTER — OFFICE VISIT (OUTPATIENT)
Dept: PODIATRY | Facility: CLINIC | Age: 70
End: 2020-09-24
Payer: MEDICARE

## 2020-09-24 VITALS
DIASTOLIC BLOOD PRESSURE: 76 MMHG | WEIGHT: 156.31 LBS | HEART RATE: 84 BPM | HEIGHT: 62 IN | SYSTOLIC BLOOD PRESSURE: 139 MMHG | BODY MASS INDEX: 28.76 KG/M2

## 2020-09-24 DIAGNOSIS — L60.2 NAIL DISORDER (ONYCHOGRYPHOSIS): ICD-10-CM

## 2020-09-24 DIAGNOSIS — E11.51 CONTROLLED TYPE 2 DM WITH PERIPHERAL CIRCULATORY DISORDER: Primary | ICD-10-CM

## 2020-09-24 PROCEDURE — 99499 NO LOS: ICD-10-PCS | Mod: S$GLB,,, | Performed by: PODIATRIST

## 2020-09-24 PROCEDURE — 11056 PR TRIM BENIGN HYPERKERATOTIC SKIN LESION,2-4: ICD-10-PCS | Mod: Q8,S$GLB,, | Performed by: PODIATRIST

## 2020-09-24 PROCEDURE — 11721 PR DEBRIDEMENT OF NAILS, 6 OR MORE: ICD-10-PCS | Mod: 59,Q8,S$GLB, | Performed by: PODIATRIST

## 2020-09-24 PROCEDURE — 99999 PR PBB SHADOW E&M-EST. PATIENT-LVL III: ICD-10-PCS | Mod: PBBFAC,,, | Performed by: PODIATRIST

## 2020-09-24 PROCEDURE — 99999 PR PBB SHADOW E&M-EST. PATIENT-LVL III: CPT | Mod: PBBFAC,,, | Performed by: PODIATRIST

## 2020-09-24 PROCEDURE — 11721 DEBRIDE NAIL 6 OR MORE: CPT | Mod: 59,Q8,S$GLB, | Performed by: PODIATRIST

## 2020-09-24 PROCEDURE — 11056 PARNG/CUTG B9 HYPRKR LES 2-4: CPT | Mod: Q8,S$GLB,, | Performed by: PODIATRIST

## 2020-09-24 PROCEDURE — 99499 UNLISTED E&M SERVICE: CPT | Mod: S$GLB,,, | Performed by: PODIATRIST

## 2020-09-24 NOTE — PROGRESS NOTES
"  [facility] -   PODIATRIC MEDICINE AND SURGERY      CHIEF COMPLAINT  Chief Complaint   Patient presents with    Routine Foot Care     PCP Dr. Sinha 9/17/20 3 mo RF     HPI    SUBJECTIVE: Maryjane Mcintyre is a 70 y.o. female who  has a past medical history of Arthritis, DM II (diabetes mellitus, type II), controlled, Gout, Hyperlipidemia, Hypertension, and Onychomycosis. Janeycepresents to clinic for high risk diabetic foot exam and care.  Maryjane denies numbness, burning, and/or tingling sensations in their feet.  She complains about thickened, elongated, painful toenails.  She admits to history of tobacco abuse.  Of note, recent diagnosis conifrmed of PAD. She is being followed by Dr. Black in cardiology. Patient has no other pedal complaints at this time      HgA1c:   Hemoglobin A1C   Date Value Ref Range Status   09/17/2020 8.9 % Final   06/03/2020 8.9 % Final   02/03/2020 8.0 % Final       REVIEW OF SYSTEMS  General: Denies any fever or chills  Chest: Denies shortness of breath, wheezing, coughing, or sputum production  Heart: Denies chest pain.  As noted above and per history of current illness above, otherwise negative in the remainder of the 14 systems.     PHYSICAL EXAM  Vitals:    09/24/20 1004   BP: 139/76   Pulse: 84   Weight: 70.9 kg (156 lb 4.9 oz)   Height: 5' 2" (1.575 m)       GEN:  This patient is well-developed, well-nourished and appears stated age, well-oriented to person, place and time, and cooperative and pleasant on today's visit.      LOWER EXTREMITY PHYSICAL EXAMINATION   VASCULAR  DP pedal pulse 1/4 RIGHT, LEFT1/4   PT pedal pulse 2/4 RIGHT, LEFT2/4  Capillary refill time immediate to the toes.   Feet are warm to the touch. Skin temperature warm to warm from proximally to distally   There are no varicosities, telangiectasias noted to bilateral foot and ankle regions.   There are no ecchymoses noted to bilateral foot and ankle regions.   There is no gross lower extremity " edema.    DERMATOLOGIC  Skin moist with healthy texture and turgor.  Thickened, dystrophic, elongated toenails with subungal debris 1-5 b/l   There are no open ulcerations, lacerations, or fissures to bilateral foot and ankle regions. There are no signs of infection as there is no erythema, no proximal-extending lymphangiitis, no fluctuance, or crepitus noted on palpation to bilateral foot and ankle regions.   There is no interdigital maceration.   There are hyperkeratotic lesions noted to feet distal tip RIGHT second digit and sub 5th metatarsal head    NEUROLOGIC  Protective sensation intact at 10/10 sites upon examination with [place] Weinsten 5.07 g monofilament.  Propioception intact at 1st MTPJ b/l.  Achilles and patellar deep tendon reflexes intact  Babinski reflex absent    ORTHOPEDIC/BIOMECHANICAL  No symptomatic structural abnormalities noted  Muscle strength AT/EHL/EDL/PT: 5/5; Achilles/Gastroc/Soleus: 5/5; PB/PL: 5/5 Muscle tone is normal.  Ankle joint ROM limited DF/PF, non-crepitus  STJ ROM  inv/ev, non crepitus       ASSESSMENT  Encounter Diagnoses   Name Primary?    Controlled type 2 DM with peripheral circulatory disorder Yes    Nail disorder (onychogryphosis)          Plan:  -Discuss presenting problems, etiology, pathologic processes and management options with patient today.   -I counseled the patient on their conditions, their implications and medical management. An in depth discussion on diabetic management, risk prevention, amputation prevention verbally and provided educational literature in written format.    -With patient's permission, nails were aggressively reduced and debrided x 10 to their soft tissue attachment mechanically and with electric , removing all offending nail and debris. Patient relates relief following the procedure. -With patient's permission via verbal consent, the involved area was cleansed with an alcohol swab. Trimming of hyperkeratotic lesions deep to  epidermal layer x 2 was performed with a #15 blade without incident.     - Shoe inspection. Diabetic Foot Education. Patient reminded of the importance of good nutrition and blood sugar control to help prevent podiatric complications of diabetes. Patient instructed on proper foot hygeine. We discussed wearing proper shoe gear, daily foot inspections, never walking without protective shoe gear, never putting sharp instruments to feet, routine podiatric visits annually/semi annually or sooner if symptoms arise    Disclaimer: This note was partially prepared using a voice recognition system and is likely to have sound alike errors within the text.        Future Appointments   Date Time Provider Department Center   10/12/2020  8:40 AM Dianne Gregory NP [facility] [place]   11/17/2020  8:15 AM Sanjiv Sinha [facility]   1/7/2021 10:15 AM Allie Cartagena DPM [facility] POD [place]       Report Electronically Signed By:     Allie Cartagena DPM   Podiatry  [facility]-   9/24/2020

## 2020-10-12 ENCOUNTER — TELEPHONE (OUTPATIENT)
Dept: GASTROENTEROLOGY | Facility: CLINIC | Age: 70
End: 2020-10-12

## 2020-10-12 ENCOUNTER — OFFICE VISIT (OUTPATIENT)
Dept: GASTROENTEROLOGY | Facility: CLINIC | Age: 70
End: 2020-10-12
Payer: MEDICARE

## 2020-10-12 VITALS
HEIGHT: 64 IN | HEART RATE: 80 BPM | WEIGHT: 155.63 LBS | BODY MASS INDEX: 26.57 KG/M2 | DIASTOLIC BLOOD PRESSURE: 78 MMHG | SYSTOLIC BLOOD PRESSURE: 138 MMHG

## 2020-10-12 DIAGNOSIS — Z79.01 CURRENT USE OF LONG TERM ANTICOAGULATION: ICD-10-CM

## 2020-10-12 DIAGNOSIS — Z12.11 COLON CANCER SCREENING: ICD-10-CM

## 2020-10-12 DIAGNOSIS — Z12.11 SPECIAL SCREENING FOR MALIGNANT NEOPLASMS, COLON: ICD-10-CM

## 2020-10-12 DIAGNOSIS — Z80.0 FAMILY HISTORY OF COLON CANCER: ICD-10-CM

## 2020-10-12 DIAGNOSIS — K21.9 GASTROESOPHAGEAL REFLUX DISEASE, UNSPECIFIED WHETHER ESOPHAGITIS PRESENT: ICD-10-CM

## 2020-10-12 DIAGNOSIS — R13.10 ODYNOPHAGIA: ICD-10-CM

## 2020-10-12 DIAGNOSIS — R13.10 DYSPHAGIA, UNSPECIFIED TYPE: ICD-10-CM

## 2020-10-12 DIAGNOSIS — K59.00 CONSTIPATION, UNSPECIFIED CONSTIPATION TYPE: Primary | ICD-10-CM

## 2020-10-12 PROCEDURE — 1159F PR MEDICATION LIST DOCUMENTED IN MEDICAL RECORD: ICD-10-PCS | Mod: S$GLB,,, | Performed by: NURSE PRACTITIONER

## 2020-10-12 PROCEDURE — 3078F DIAST BP <80 MM HG: CPT | Mod: CPTII,S$GLB,, | Performed by: NURSE PRACTITIONER

## 2020-10-12 PROCEDURE — 99999 PR PBB SHADOW E&M-EST. PATIENT-LVL V: CPT | Mod: PBBFAC,,, | Performed by: NURSE PRACTITIONER

## 2020-10-12 PROCEDURE — 99999 PR PBB SHADOW E&M-EST. PATIENT-LVL V: ICD-10-PCS | Mod: PBBFAC,,, | Performed by: NURSE PRACTITIONER

## 2020-10-12 PROCEDURE — 3008F PR BODY MASS INDEX (BMI) DOCUMENTED: ICD-10-PCS | Mod: CPTII,S$GLB,, | Performed by: NURSE PRACTITIONER

## 2020-10-12 PROCEDURE — 3075F SYST BP GE 130 - 139MM HG: CPT | Mod: CPTII,S$GLB,, | Performed by: NURSE PRACTITIONER

## 2020-10-12 PROCEDURE — 1101F PR PT FALLS ASSESS DOC 0-1 FALLS W/OUT INJ PAST YR: ICD-10-PCS | Mod: CPTII,S$GLB,, | Performed by: NURSE PRACTITIONER

## 2020-10-12 PROCEDURE — 1101F PT FALLS ASSESS-DOCD LE1/YR: CPT | Mod: CPTII,S$GLB,, | Performed by: NURSE PRACTITIONER

## 2020-10-12 PROCEDURE — 3008F BODY MASS INDEX DOCD: CPT | Mod: CPTII,S$GLB,, | Performed by: NURSE PRACTITIONER

## 2020-10-12 PROCEDURE — 1125F PR PAIN SEVERITY QUANTIFIED, PAIN PRESENT: ICD-10-PCS | Mod: S$GLB,,, | Performed by: NURSE PRACTITIONER

## 2020-10-12 PROCEDURE — 99204 PR OFFICE/OUTPT VISIT, NEW, LEVL IV, 45-59 MIN: ICD-10-PCS | Mod: S$GLB,,, | Performed by: NURSE PRACTITIONER

## 2020-10-12 PROCEDURE — 1159F MED LIST DOCD IN RCRD: CPT | Mod: S$GLB,,, | Performed by: NURSE PRACTITIONER

## 2020-10-12 PROCEDURE — 3075F PR MOST RECENT SYSTOLIC BLOOD PRESS GE 130-139MM HG: ICD-10-PCS | Mod: CPTII,S$GLB,, | Performed by: NURSE PRACTITIONER

## 2020-10-12 PROCEDURE — 3078F PR MOST RECENT DIASTOLIC BLOOD PRESSURE < 80 MM HG: ICD-10-PCS | Mod: CPTII,S$GLB,, | Performed by: NURSE PRACTITIONER

## 2020-10-12 PROCEDURE — 1125F AMNT PAIN NOTED PAIN PRSNT: CPT | Mod: S$GLB,,, | Performed by: NURSE PRACTITIONER

## 2020-10-12 PROCEDURE — 99204 OFFICE O/P NEW MOD 45 MIN: CPT | Mod: S$GLB,,, | Performed by: NURSE PRACTITIONER

## 2020-10-12 RX ORDER — SODIUM, POTASSIUM,MAG SULFATES 17.5-3.13G
SOLUTION, RECONSTITUTED, ORAL ORAL
Qty: 354 ML | Refills: 0 | Status: SHIPPED | OUTPATIENT
Start: 2020-10-12 | End: 2022-11-21

## 2020-10-12 RX ORDER — OMEPRAZOLE 20 MG/1
20 CAPSULE, DELAYED RELEASE ORAL DAILY
Qty: 30 CAPSULE | Refills: 11 | Status: SHIPPED | OUTPATIENT
Start: 2020-10-12 | End: 2021-03-11 | Stop reason: SDUPTHER

## 2020-10-12 NOTE — TELEPHONE ENCOUNTER
"    ENDO screening    1. Have you been admitted for cardiac, kidney or pulmonary causes to the hospital in the past 3 months? no   If yes, schedule an appointment with PCP before scheduling endoscopic procedure.     2. Have you had a stent placed in the last 12 months? no   If yes, for a screening visit, cancel and message the ordering provider.  The patient will need a new order when the time is appropriate.     3. Have you had a stroke or heart attack in the past 6 months? no   If yes, cancel and refer patient to ordering provider for clearance, also message ordering provider to inform.     4. Have you had any chest pain in the past 3 months? no   If yes, Have you been evaluated by your PCP and/or cardiologist and it was determined to not be heart related? not applicable   If No, Pt needs to be seen by PCP or Cardiologist .  Pt can be scheduled once clearance obtained by either of those providers.     5. Do you take prescription weight loss medications?  no   If yes, must stop for 2 weeks prior to procedure.     6. Have you been diagnosed with diverticulitis within the past 3 months? no   If yes, must have been seen by GI within the last 3 months, if not schedule with GI [facility].    If pt has been seen by GI, schedule procedure 8-12 weeks post antibiotic treatment.     7. Are you on Dialysis? no  If yes, schedule procedure for the day AFTER dialysis.  Appt time should be 9am or later, patient arrival time is 2 hours prior.  Nulytely or miralax prep for all patients with kidney disease.     8. Are you diabetic?  yes   If yes, schedule morning appt. Advise pt to hold all diabetic meds day of procedure.     9. If pt is older than 80 years of age and HAS NOT been seen by GI or PCP within the last 6 months, needs appt with GI [facility].   If pt has been seen by the GI provider or PCP within the past 6  months AND meets criteria, schedule procedure AND send message to the endoscopist.     10. Is patient on a "high risk" " medication (blood thinner/antiplatelet agent)?  yes   If yes, has cardiac clearance been obtained within the last 60 days? Yes   If no, a new clearance needs to be obtained.     Final Questions:    1.I have reviewed the last colonoscopy for recommendations regarding next procedure bowel prep.  yes  2. I have reviewed medications and allergies.  yes  3. I have verified the pharmacy information and appropriate prep sent if needed. yes  4. Prep instructions have been mailed or sent to portal per patient request. yes        All schedulers will have ability to reach out to the ordering GI provider to clarify any issues.

## 2020-10-12 NOTE — LETTER
October 12, 2020      Sanjiv Sinha MD  [address] [place] [address]           [facility]  [address] [address]  Phone: [phone]  Fax: [phone]          Patient: Maryjane Mcintyre   MR Number: [id number]   YOB: 1950   Date of Visit: 10/12/2020       Dear Dr. Sanjiv Sinha:    Thank you for referring Maryjane Mcintyre to me for evaluation. Attached you will find relevant portions of my assessment and plan of care.    If you have questions, please do not hesitate to call me. I look forward to following Maryjane Mcintyre along with you.    Sincerely,    Dianne Gregory, MIRNA    Enclosure  CC:  No Recipients    If you would like to receive this communication electronically, please contact [email or web address] or [phone] to request more information on [employer] [employer] access.    For providers and/or their staff who would like to refer a patient to [facility], please contact us through our one-stop-shop provider referral line, [facility], at [phone].    If you feel you have received this communication in error or would no longer like to receive these types of communications, please e-mail [email or web address]

## 2020-10-12 NOTE — PROGRESS NOTES
Clinic Consult:  [facility] Consultation Note    Reason for Consult:  The primary encounter diagnosis was Constipation, unspecified constipation type. Diagnoses of Odynophagia, Dysphagia, unspecified type, Special screening for malignant neoplasms, colon, Current use of long term anticoagulation, Family history of colon cancer, Gastroesophageal reflux disease, unspecified whether esophagitis present, and Colon cancer screening were also pertinent to this visit.    PCP: Sanjiv Sinha   [id number] [facility] / [place]*    HPI:  This is a 70 y.o. female here for evaluation of the above.     Constipation  Onset: 2 months ago  Stool frequency: every 2-3 days   Stool consistency: soft  Incomplete evacuation: yes  Associated symptoms: abdominal pain   Hematochezia or melena present: none  Treatments tried: Miralax- improving     Odynophagia   Painful swallowing x 1 year ago.   Pain with swallowing  +ve for dysphagia with solids only. She reports proximal symptoms.   Has GERD- no treatment.      She has a family history of colon cancer in her brother and possibly her sister. She is unsure if she ever had a colonoscopy before. She is on anti-platelet-- Pletal. Managed by .     Review of Systems   Constitutional: Negative for fever, malaise/fatigue and weight loss.   HENT: Negative for sore throat.    Respiratory: Negative for cough and wheezing.    Cardiovascular: Negative for chest pain and palpitations.   Gastrointestinal: Positive for constipation and heartburn. Negative for abdominal pain, blood in stool, diarrhea, melena, nausea and vomiting.   Genitourinary: Negative for dysuria and frequency.   Musculoskeletal: Negative for back pain, joint pain, myalgias and neck pain.   Skin: Negative for itching and rash.   Neurological: Negative for dizziness, speech change, seizures, loss of consciousness and headaches.   Psychiatric/Behavioral: Negative for depression and  "substance abuse. The patient is not nervous/anxious.        Medical History:  has a past medical history of Arthritis, DM II (diabetes mellitus, type II), controlled, Gout, Hyperlipidemia, Hypertension, and Onychomycosis.    Surgical History:  has a past surgical history that includes Hysterectomy; Gallbladder surgery; and Cardiac Echo.    Family History: family history includes Colon cancer in her brother and sister; Diabetes in her father; Heart attack in her sister; Hypertension in her father; Stroke in her sister..     Social History:  reports that she has quit smoking. Her smoking use included cigarettes. She smoked 1.00 pack per day. She has never used smokeless tobacco. She reports that she does not drink alcohol or use drugs.    Allergies: Reviewed    Home Medications:   Current Outpatient Medications on File Prior to Visit   Medication Sig Dispense Refill    acetaminophen (TYLENOL) 160 mg/5 mL Liqd Take 100 mg by mouth daily as needed. Take one tablet by mouth once daily as needed      amLODIPine (NORVASC) 10 MG tablet Take 1 tablet (10 mg total) by mouth once daily. Take 1 tablet by mouth every day 90 tablet 1    BD ULTRA-FINE MAGDALENA PEN NEEDLE 32 gauge x 5/32" Ndle USE TWICE DAILY FOR INSULIN  each 1    cholecalciferol, vitamin D3, 5,000 unit capsule Take 5,000 Units by mouth once daily.  1    cilostazoL (PLETAL) 50 MG Tab Take 50 mg by mouth 2 (two) times daily. Take one tablet by mouth twice daily      hydroCHLOROthiazide (HYDRODIURIL) 12.5 MG Tab TAKE 1 TABLET BY MOUTH ONCE DAILY 90 tablet 2    insulin (LANTUS SOLOSTAR U-100 INSULIN) glargine 100 units/mL (3mL) SubQ pen Inject 30 Units into the skin every evening. 30 mL 3    intravenous infus.pump accesry (500ML PRESSURE INFUSION CUFF MISC) Take 500 mg by mouth. Take 2 tablet in the morning and 1 tablet at night      ipratropium-albuteroL (COMBIVENT)  mcg/actuation inhaler Inhale 1 puff into the lungs every 6 (six) hours as needed " "for Wheezing or Shortness of Breath. Rescue 4 g 3    labetaloL (NORMODYNE) 200 MG tablet TAKE 1 TABLET BY MOUTH TWICE DAILY 180 tablet 1    losartan (COZAAR) 100 MG tablet Take 1 tablet (100 mg total) by mouth as needed. 90 tablet 1    metFORMIN (GLUCOPHAGE-XR) 500 MG 24 hr tablet Take 2 tablets in  The AM and take 1 tablet at night 270 tablet 1    polyethylene glycol (GLYCOLAX) 17 gram PwPk Take 17 g by mouth daily as needed (constipat). 30 each 3    pravastatin (PRAVACHOL) 80 MG tablet Take 80 mg by mouth every evening. Take 1 tablet by mouth every evening      TRADJENTA 5 mg Tab tablet Take 1 tablet (5 mg total) by mouth once daily. 90 tablet 1    cetirizine (ZYRTEC) 5 MG tablet TAKE 1 TABLET BY MOUTH EVERY DAY AS NEEDED FOR ALLERGIES (Patient not taking: Reported on 10/12/2020) 90 tablet 1    fluticasone propionate (FLONASE) 50 mcg/actuation nasal spray SHAKE LIQUID AND USE 1 TO 2 SPRAYS IN EACH NOSTRIL EVERY DAY (Patient not taking: Reported on 10/12/2020) 16 g 3     No current facility-administered medications on file prior to visit.        Physical Exam:  /78   Pulse 80   Ht 5' 4" (1.626 m)   Wt 70.6 kg (155 lb 10.3 oz)   BMI 26.72 kg/m²   Body mass index is 26.72 kg/m².  Physical Exam   Constitutional: She is oriented to person, place, and time and well-developed, well-nourished, and in no distress. No distress.   HENT:   Head: Normocephalic.   Eyes: Pupils are equal, round, and reactive to light. Conjunctivae are normal.   Cardiovascular: Normal rate, regular rhythm and normal heart sounds.   Pulmonary/Chest: Effort normal and breath sounds normal. No respiratory distress.   Abdominal: Soft. Bowel sounds are normal. She exhibits no distension. There is no abdominal tenderness.   Neurological: She is alert and oriented to person, place, and time. No cranial nerve deficit.   Skin: Skin is warm and dry. No rash noted.   Psychiatric: Mood and affect normal.       Labs: Pertinent labs " reviewed.  CRC Screening: due     Assessment:  1. Constipation, unspecified constipation type    2. Odynophagia    3. Dysphagia, unspecified type    4. Special screening for malignant neoplasms, colon    5. Current use of long term anticoagulation    6. Family history of colon cancer    7. Gastroesophageal reflux disease, unspecified whether esophagitis present    8. Colon cancer screening        Recommendations:   - Miralax for constipation  - needs EGD for UGI symptoms. Start low dose PPI    - needs colonoscopy at time of EGD for screening.-- high risk 2/2 family history.   - need approval to hold Pletal.     Constipation, unspecified constipation type  -     Ambulatory referral/consult to Gastroenterology  -     Case request GI: ESOPHAGOGASTRODUODENOSCOPY (EGD), COLONOSCOPY  -     SUPREP BOWEL PREP KIT 17.5-3.13-1.6 gram SolR; Use as directed  Dispense: 354 mL; Refill: 0  -     COVID-19 Routine Screening; Future; Expected date: 10/12/2020    Odynophagia  -     Ambulatory referral/consult to Gastroenterology  -     omeprazole (PRILOSEC) 20 MG capsule; Take 1 capsule (20 mg total) by mouth once daily.  Dispense: 30 capsule; Refill: 11  -     Case request GI: ESOPHAGOGASTRODUODENOSCOPY (EGD), COLONOSCOPY  -     SUPREP BOWEL PREP KIT 17.5-3.13-1.6 gram SolR; Use as directed  Dispense: 354 mL; Refill: 0  -     COVID-19 Routine Screening; Future; Expected date: 10/12/2020    Dysphagia, unspecified type  -     omeprazole (PRILOSEC) 20 MG capsule; Take 1 capsule (20 mg total) by mouth once daily.  Dispense: 30 capsule; Refill: 11  -     Case request GI: ESOPHAGOGASTRODUODENOSCOPY (EGD), COLONOSCOPY  -     SUPREP BOWEL PREP KIT 17.5-3.13-1.6 gram SolR; Use as directed  Dispense: 354 mL; Refill: 0  -     COVID-19 Routine Screening; Future; Expected date: 10/12/2020    Special screening for malignant neoplasms, colon  -     Ambulatory referral/consult to Gastroenterology  -     Case request GI: ESOPHAGOGASTRODUODENOSCOPY  (EGD), COLONOSCOPY  -     SUPREP BOWEL PREP KIT 17.5-3.13-1.6 gram SolR; Use as directed  Dispense: 354 mL; Refill: 0  -     COVID-19 Routine Screening; Future; Expected date: 10/12/2020    Current use of long term anticoagulation  -     Case request GI: ESOPHAGOGASTRODUODENOSCOPY (EGD), COLONOSCOPY  -     SUPREP BOWEL PREP KIT 17.5-3.13-1.6 gram SolR; Use as directed  Dispense: 354 mL; Refill: 0  -     COVID-19 Routine Screening; Future; Expected date: 10/12/2020    Family history of colon cancer  -     Case request GI: ESOPHAGOGASTRODUODENOSCOPY (EGD), COLONOSCOPY  -     SUPREP BOWEL PREP KIT 17.5-3.13-1.6 gram SolR; Use as directed  Dispense: 354 mL; Refill: 0  -     COVID-19 Routine Screening; Future; Expected date: 10/12/2020    Gastroesophageal reflux disease, unspecified whether esophagitis present  -     omeprazole (PRILOSEC) 20 MG capsule; Take 1 capsule (20 mg total) by mouth once daily.  Dispense: 30 capsule; Refill: 11  -     Case request GI: ESOPHAGOGASTRODUODENOSCOPY (EGD), COLONOSCOPY  -     SUPREP BOWEL PREP KIT 17.5-3.13-1.6 gram SolR; Use as directed  Dispense: 354 mL; Refill: 0  -     COVID-19 Routine Screening; Future; Expected date: 10/12/2020    Colon cancer screening  -     COVID-19 Routine Screening; Future; Expected date: 10/12/2020    Follow up to be determined after results/ procedure(s).    Thank you so much for allowing me to participate in the care of DONA Rainey

## 2020-10-13 ENCOUNTER — TELEPHONE (OUTPATIENT)
Dept: GASTROENTEROLOGY | Facility: CLINIC | Age: 70
End: 2020-10-13

## 2020-10-13 NOTE — TELEPHONE ENCOUNTER
----- Message from Lilly Chaudhary MA sent at 10/12/2020  3:14 PM CDT -----  Regarding: FW: pre-op clearance  Good afternoon,    Please see Dr. Black's response below.    Thanks in advance.    ----- Message -----  From: Candelario Black MD  Sent: 10/12/2020   2:29 PM CDT  To: Lilly Chaudhary MA  Subject: RE: pre-op clearance                             Ok to hold 7 days before the procedure  Resume post op  ----- Message -----  From: Lilly Chaudhary MA  Sent: 10/12/2020  10:42 AM CDT  To: Candelario Black MD  Subject: pre-op clearance                                 Please advise    ----- Message -----  From: Mag Posada LPN  Sent: 10/12/2020  10:19 AM CDT  To: Wayne Palomino Staff    Our mutual patient Maryjane Mcintyre is scheduled for endoscopy 10/19/20 and is taking cilostazoL (PLETAL) 50 MG Tab.  Please advise.    Thank you,  Cl[facility]

## 2020-10-13 NOTE — TELEPHONE ENCOUNTER
Phoned patient and advised her that Dr. Black said it was ok for her to stop taking the Pletal before the procedure on 10/19/20.  Patient verbalized understanding.

## 2020-10-14 ENCOUNTER — TELEPHONE (OUTPATIENT)
Dept: GASTROENTEROLOGY | Facility: CLINIC | Age: 70
End: 2020-10-14

## 2020-10-16 ENCOUNTER — LAB VISIT (OUTPATIENT)
Dept: OTOLARYNGOLOGY | Facility: CLINIC | Age: 70
End: 2020-10-16
Payer: MEDICARE

## 2020-10-16 DIAGNOSIS — K21.9 GASTROESOPHAGEAL REFLUX DISEASE, UNSPECIFIED WHETHER ESOPHAGITIS PRESENT: ICD-10-CM

## 2020-10-16 DIAGNOSIS — Z12.11 SPECIAL SCREENING FOR MALIGNANT NEOPLASMS, COLON: ICD-10-CM

## 2020-10-16 DIAGNOSIS — R13.10 DYSPHAGIA, UNSPECIFIED TYPE: ICD-10-CM

## 2020-10-16 DIAGNOSIS — Z12.11 COLON CANCER SCREENING: ICD-10-CM

## 2020-10-16 DIAGNOSIS — Z79.01 CURRENT USE OF LONG TERM ANTICOAGULATION: ICD-10-CM

## 2020-10-16 DIAGNOSIS — K59.00 CONSTIPATION, UNSPECIFIED CONSTIPATION TYPE: ICD-10-CM

## 2020-10-16 DIAGNOSIS — Z80.0 FAMILY HISTORY OF COLON CANCER: ICD-10-CM

## 2020-10-16 DIAGNOSIS — R13.10 ODYNOPHAGIA: ICD-10-CM

## 2020-10-16 PROCEDURE — U0003 INFECTIOUS AGENT DETECTION BY NUCLEIC ACID (DNA OR RNA); SEVERE ACUTE RESPIRATORY SYNDROME CORONAVIRUS 2 (SARS-COV-2) (CORONAVIRUS DISEASE [COVID-19]), AMPLIFIED PROBE TECHNIQUE, MAKING USE OF HIGH THROUGHPUT TECHNOLOGIES AS DESCRIBED BY CMS-2020-01-R: HCPCS

## 2020-10-17 LAB — SARS-COV-2 RNA RESP QL NAA+PROBE: NOT DETECTED

## 2020-10-19 ENCOUNTER — ANESTHESIA (OUTPATIENT)
Dept: ENDOSCOPY | Facility: HOSPITAL | Age: 70
End: 2020-10-19
Payer: MEDICARE

## 2020-10-19 ENCOUNTER — ANESTHESIA EVENT (OUTPATIENT)
Dept: ENDOSCOPY | Facility: HOSPITAL | Age: 70
End: 2020-10-19
Payer: MEDICARE

## 2020-10-19 ENCOUNTER — TELEPHONE (OUTPATIENT)
Dept: ENDOSCOPY | Facility: HOSPITAL | Age: 70
End: 2020-10-19

## 2020-10-19 ENCOUNTER — HOSPITAL ENCOUNTER (OUTPATIENT)
Facility: HOSPITAL | Age: 70
Discharge: HOME | End: 2020-10-19
Attending: INTERNAL MEDICINE | Admitting: INTERNAL MEDICINE
Payer: MEDICARE

## 2020-10-19 VITALS
TEMPERATURE: 97 F | RESPIRATION RATE: 18 BRPM | BODY MASS INDEX: 26.04 KG/M2 | SYSTOLIC BLOOD PRESSURE: 97 MMHG | DIASTOLIC BLOOD PRESSURE: 55 MMHG | OXYGEN SATURATION: 95 % | HEART RATE: 78 BPM | WEIGHT: 151.69 LBS

## 2020-10-19 DIAGNOSIS — R13.10 DYSPHAGIA, UNSPECIFIED TYPE: Primary | ICD-10-CM

## 2020-10-19 DIAGNOSIS — R13.10 DYSPHAGIA: ICD-10-CM

## 2020-10-19 LAB — POCT GLUCOSE: 135 MG/DL (ref 70–110)

## 2020-10-19 PROCEDURE — 88342 CHG IMMUNOCYTOCHEMISTRY: ICD-10-PCS | Mod: 26,,, | Performed by: PATHOLOGY

## 2020-10-19 PROCEDURE — 27201089 HC SNARE, DISP (ANY): Performed by: INTERNAL MEDICINE

## 2020-10-19 PROCEDURE — 88342 IMHCHEM/IMCYTCHM 1ST ANTB: CPT | Mod: 26,,, | Performed by: PATHOLOGY

## 2020-10-19 PROCEDURE — 43239 EGD BIOPSY SINGLE/MULTIPLE: CPT | Performed by: INTERNAL MEDICINE

## 2020-10-19 PROCEDURE — 43239 EGD BIOPSY SINGLE/MULTIPLE: CPT | Mod: 51,,, | Performed by: INTERNAL MEDICINE

## 2020-10-19 PROCEDURE — 37000008 HC ANESTHESIA 1ST 15 MINUTES: Performed by: INTERNAL MEDICINE

## 2020-10-19 PROCEDURE — 88305 TISSUE EXAM BY PATHOLOGIST: ICD-10-PCS | Mod: 26,,, | Performed by: PATHOLOGY

## 2020-10-19 PROCEDURE — 27201012 HC FORCEPS, HOT/COLD, DISP: Performed by: INTERNAL MEDICINE

## 2020-10-19 PROCEDURE — 63600175 PHARM REV CODE 636 W HCPCS: Performed by: NURSE ANESTHETIST, CERTIFIED REGISTERED

## 2020-10-19 PROCEDURE — 88305 TISSUE EXAM BY PATHOLOGIST: CPT | Performed by: PATHOLOGY

## 2020-10-19 PROCEDURE — 43239 PR EGD, FLEX, W/BIOPSY, SGL/MULTI: ICD-10-PCS | Mod: 51,,, | Performed by: INTERNAL MEDICINE

## 2020-10-19 PROCEDURE — 88305 TISSUE EXAM BY PATHOLOGIST: CPT | Mod: 26,,, | Performed by: PATHOLOGY

## 2020-10-19 PROCEDURE — 25000003 PHARM REV CODE 250: Performed by: NURSE ANESTHETIST, CERTIFIED REGISTERED

## 2020-10-19 PROCEDURE — 37000009 HC ANESTHESIA EA ADD 15 MINS: Performed by: INTERNAL MEDICINE

## 2020-10-19 PROCEDURE — 45385 PR COLONOSCOPY,REMV LESN,SNARE: ICD-10-PCS | Mod: PT,,, | Performed by: INTERNAL MEDICINE

## 2020-10-19 PROCEDURE — 45385 COLONOSCOPY W/LESION REMOVAL: CPT | Mod: PT,,, | Performed by: INTERNAL MEDICINE

## 2020-10-19 PROCEDURE — 45385 COLONOSCOPY W/LESION REMOVAL: CPT | Performed by: INTERNAL MEDICINE

## 2020-10-19 PROCEDURE — 88342 IMHCHEM/IMCYTCHM 1ST ANTB: CPT | Performed by: PATHOLOGY

## 2020-10-19 RX ORDER — LIDOCAINE HCL/PF 100 MG/5ML
SYRINGE (ML) INTRAVENOUS
Status: DISCONTINUED | OUTPATIENT
Start: 2020-10-19 | End: 2020-10-19

## 2020-10-19 RX ORDER — PROPOFOL 10 MG/ML
INJECTION, EMULSION INTRAVENOUS
Status: DISCONTINUED | OUTPATIENT
Start: 2020-10-19 | End: 2020-10-19

## 2020-10-19 RX ORDER — SODIUM CHLORIDE, SODIUM LACTATE, POTASSIUM CHLORIDE, CALCIUM CHLORIDE 600; 310; 30; 20 MG/100ML; MG/100ML; MG/100ML; MG/100ML
INJECTION, SOLUTION INTRAVENOUS CONTINUOUS PRN
Status: DISCONTINUED | OUTPATIENT
Start: 2020-10-19 | End: 2020-10-19

## 2020-10-19 RX ORDER — SODIUM CHLORIDE 0.9 % (FLUSH) 0.9 %
10 SYRINGE (ML) INJECTION
Status: DISCONTINUED | OUTPATIENT
Start: 2020-10-19 | End: 2020-10-19 | Stop reason: HOSPADM

## 2020-10-19 RX ADMIN — SODIUM CHLORIDE, SODIUM LACTATE, POTASSIUM CHLORIDE, AND CALCIUM CHLORIDE: 600; 310; 30; 20 INJECTION, SOLUTION INTRAVENOUS at 11:10

## 2020-10-19 RX ADMIN — PROPOFOL 30 MG: 10 INJECTION, EMULSION INTRAVENOUS at 11:10

## 2020-10-19 RX ADMIN — PROPOFOL 30 MG: 10 INJECTION, EMULSION INTRAVENOUS at 12:10

## 2020-10-19 RX ADMIN — LIDOCAINE HYDROCHLORIDE 50 MG: 20 INJECTION, SOLUTION INTRAVENOUS at 11:10

## 2020-10-19 RX ADMIN — PROPOFOL 70 MG: 10 INJECTION, EMULSION INTRAVENOUS at 11:10

## 2020-10-19 RX ADMIN — GLYCOPYRROLATE 0.2 MG: 0.2 INJECTION, SOLUTION INTRAMUSCULAR; INTRAVITREAL at 11:10

## 2020-10-19 NOTE — [employer] PATIENT INSTRUCTIONS
Discharge Summary/Instructions after an Endoscopic Procedure  Patient Name: Maryjane Mcintyre  Patient MRN: [id number]  Patient YOB: 1950 Monday, October 19, 2020 Yousuf Fuller MD  RESTRICTIONS:  During your procedure today, you received medications for sedation.  These   medications may affect your judgment, balance and coordination.  Therefore,   for 24 hours, you have the following restrictions:   - DO NOT drive a car, operate machinery, make legal/financial decisions,   sign important papers or drink alcohol.    ACTIVITY:  Today: no heavy lifting, straining or running due to procedural   sedation/anesthesia.  The following day: return to full activity including work.  DIET:  Eat and drink normally unless instructed otherwise.     TREATMENT FOR COMMON SIDE EFFECTS:  - Mild abdominal pain, nausea, belching, bloating or excessive gas:  rest,   eat lightly and use a heating pad.  - Sore Throat: treat with throat lozenges and/or gargle with warm salt   water.  - Because air was used during the procedure, expelling large amounts of air   from your rectum or belching is normal.  - If a bowel prep was taken, you may not have a bowel movement for 1-3 days.    This is normal.  SYMPTOMS TO WATCH FOR AND REPORT TO YOUR PHYSICIAN:  1. Abdominal pain or bloating, other than gas cramps.  2. Chest pain.  3. Back pain.  4. Signs of infection such as: chills or fever occurring within 24 hours   after the procedure.  5. Rectal bleeding, which would show as bright red, maroon, or black stools.   (A tablespoon of blood from the rectum is not serious, especially if   hemorrhoids are present.)  6. Vomiting.  7. Weakness or dizziness.  GO DIRECTLY TO THE NEAREST EMERGENCY ROOM IF YOU HAVE ANY OF THE FOLLOWING:      Difficulty breathing              Chills and/or fever over 101 F   Persistent vomiting and/or vomiting blood   Severe abdominal pain   Severe chest pain   Black, tarry stools   Bleeding- more than one  tablespoon   Any other symptom or condition that you feel may need urgent attention  Your doctor recommends these additional instructions:  If any biopsies were taken, your doctors clinic will contact you in 1 to 2   weeks with any results.  - Discharge patient to home.   - Resume previous diet.   - Continue present medications.   - Await pathology results.   - Return to referring physician.  For questions, problems or results please call your physician Yousuf Fuller MD at Work:  [phone]  If you have any questions about the above instructions, call the GI   department at [phone] or call the endoscopy unit at [phone]   from 7am until 3 pm.  [facility], EMERGENCY ROOM PHONE NUMBER:   [phone]  IF A COMPLICATION OR EMERGENCY SITUATION ARISES AND YOU ARE UNABLE TO REACH   YOUR PHYSICIAN - GO DIRECTLY TO THE EMERGENCY ROOM.  I have read or have had read to me these discharge instructions for my   procedure and have received a written copy.  I understand these   instructions and will follow-up with my physician if I have any questions.     __________________________________       _____________________________________  Nurse Signature                                          Patient/Designated   Responsible Party Signature  Yousuf Fuller MD  10/19/2020 12:17:34 PM  This report has been verified and signed electronically.  [employer]

## 2020-10-19 NOTE — [employer] PATIENT INSTRUCTIONS
Discharge Summary/Instructions after an Endoscopic Procedure  Patient Name: Maryjane Mcintyre  Patient MRN: [id number]  Patient YOB: 1950 Monday, October 19, 2020 Yousuf Fuller MD  RESTRICTIONS:  During your procedure today, you received medications for sedation.  These   medications may affect your judgment, balance and coordination.  Therefore,   for 24 hours, you have the following restrictions:   - DO NOT drive a car, operate machinery, make legal/financial decisions,   sign important papers or drink alcohol.    ACTIVITY:  Today: no heavy lifting, straining or running due to procedural   sedation/anesthesia.  The following day: return to full activity including work.  DIET:  Eat and drink normally unless instructed otherwise.     TREATMENT FOR COMMON SIDE EFFECTS:  - Mild abdominal pain, nausea, belching, bloating or excessive gas:  rest,   eat lightly and use a heating pad.  - Sore Throat: treat with throat lozenges and/or gargle with warm salt   water.  - Because air was used during the procedure, expelling large amounts of air   from your rectum or belching is normal.  - If a bowel prep was taken, you may not have a bowel movement for 1-3 days.    This is normal.  SYMPTOMS TO WATCH FOR AND REPORT TO YOUR PHYSICIAN:  1. Abdominal pain or bloating, other than gas cramps.  2. Chest pain.  3. Back pain.  4. Signs of infection such as: chills or fever occurring within 24 hours   after the procedure.  5. Rectal bleeding, which would show as bright red, maroon, or black stools.   (A tablespoon of blood from the rectum is not serious, especially if   hemorrhoids are present.)  6. Vomiting.  7. Weakness or dizziness.  GO DIRECTLY TO THE NEAREST EMERGENCY ROOM IF YOU HAVE ANY OF THE FOLLOWING:      Difficulty breathing              Chills and/or fever over 101 F   Persistent vomiting and/or vomiting blood   Severe abdominal pain   Severe chest pain   Black, tarry stools   Bleeding- more than one  tablespoon   Any other symptom or condition that you feel may need urgent attention  Your doctor recommends these additional instructions:  If any biopsies were taken, your doctors clinic will contact you in 1 to 2   weeks with any results.  - Discharge patient to home.   - Resume previous diet.   - Continue present medications.   - Await pathology results.   - Repeat colonoscopy in 5 years for surveillance.   - Return to referring physician.   - Patient has a contact number available for emergencies.  The signs and   symptoms of potential delayed complications were discussed with the   patient.  Return to normal activities tomorrow.  Written discharge   instructions were provided to the patient.  For questions, problems or results please call your physician Yousuf Fuller MD at Work:  [phone]  If you have any questions about the above instructions, call the GI   department at [phone] or call the endoscopy unit at [phone]   from 7am until 3 pm.  [facility], EMERGENCY ROOM PHONE NUMBER:   [phone]  IF A COMPLICATION OR EMERGENCY SITUATION ARISES AND YOU ARE UNABLE TO REACH   YOUR PHYSICIAN - GO DIRECTLY TO THE EMERGENCY ROOM.  I have read or have had read to me these discharge instructions for my   procedure and have received a written copy.  I understand these   instructions and will follow-up with my physician if I have any questions.     __________________________________       _____________________________________  Nurse Signature                                          Patient/Designated   Responsible Party Signature  Yousuf Fuller MD  10/19/2020 12:20:00 PM  This report has been verified and signed electronically.  [employer]

## 2020-10-19 NOTE — H&P
"PRE PROCEDURE H&P    Patient Name: Maryjane Mcintyre  MRN: [id number]  : 1950  Date of Procedure:  10/19/2020  Referring Physician: Dianne Gregory NP  Primary Physician: Sanjiv Sinha MD  Procedure Physician: Yousuf Fuller MD       Planned Procedure: EGD/colonoscopy   Diagnosis: dysphagia, constipation, screening, family history of colon cancer   Chief Complaint: Same as above    HPI: Patient is an 70 y.o. female is here for the above.     Last colonoscopy: this is here index colonoscopy   Family history: FH of colon cancer in her brother and sister  Anticoagulation: Held pletal for the past six days     Past Medical History:   Past Medical History:   Diagnosis Date    Arthritis     DM II (diabetes mellitus, type II), controlled     Gout     Hyperlipidemia     Hypertension     Onychomycosis         Past Surgical History:  Past Surgical History:   Procedure Laterality Date    Cardiac Echo      GALLBLADDER SURGERY      HYSTERECTOMY          Home Medications:  Prior to Admission medications    Medication Sig Start Date End Date Taking? Authorizing Provider   acetaminophen (TYLENOL) 160 mg/5 mL Liqd Take 100 mg by mouth daily as needed. Take one tablet by mouth once daily as needed 3/18/20  Yes Sanjiv Sinha MD   amLODIPine (NORVASC) 10 MG tablet Take 1 tablet (10 mg total) by mouth once daily. Take 1 tablet by mouth every day 20  Yes Sanjiv Sinha MD   BD ULTRA-FINE MAGDALENA PEN NEEDLE 32 gauge x 5/32" Ndle USE TWICE DAILY FOR INSULIN PEN 20  Yes Sanjiv Sinha MD   cetirizine (ZYRTEC) 5 MG tablet TAKE 1 TABLET BY MOUTH EVERY DAY AS NEEDED FOR ALLERGIES 20  Yes Sanjiv Sinha MD   cholecalciferol, vitamin D3, 5,000 unit capsule Take 5,000 Units by mouth once daily. 2/15/18  Yes Historical Provider   cilostazoL (PLETAL) 50 MG Tab Take 50 mg by mouth 2 (two) times daily. Take one tablet by mouth twice daily 20  Yes Candelario Black MD   fluticasone propionate (FLONASE) 50 " mcg/actuation nasal spray SHAKE LIQUID AND USE 1 TO 2 SPRAYS IN EACH NOSTRIL EVERY DAY 4/6/20  Yes Sanjiv Sinha MD   hydroCHLOROthiazide (HYDRODIURIL) 12.5 MG Tab TAKE 1 TABLET BY MOUTH ONCE DAILY 8/17/20  Yes Sanjiv Sinha MD   insulin (LANTUS SOLOSTAR U-100 INSULIN) glargine 100 units/mL (3mL) SubQ pen Inject 30 Units into the skin every evening. 9/17/20 9/17/21 Yes Sanjiv Sinha MD   ipratropium-albuteroL (COMBIVENT)  mcg/actuation inhaler Inhale 1 puff into the lungs every 6 (six) hours as needed for Wheezing or Shortness of Breath. Rescue 6/25/20  Yes Sanjiv Sinha MD   labetaloL (NORMODYNE) 200 MG tablet TAKE 1 TABLET BY MOUTH TWICE DAILY 8/13/20  Yes Sanjiv Sinha MD   losartan (COZAAR) 100 MG tablet Take 1 tablet (100 mg total) by mouth as needed. 9/17/20  Yes Sanjiv Sinha MD   metFORMIN (GLUCOPHAGE-XR) 500 MG 24 hr tablet Take 2 tablets in  The AM and take 1 tablet at night 2/3/20  Yes Sanjiv Sinha MD   omeprazole (PRILOSEC) 20 MG capsule Take 1 capsule (20 mg total) by mouth once daily. 10/12/20 10/12/21 Yes Dianne Gregory NP   polyethylene glycol (GLYCOLAX) 17 gram PwPk Take 17 g by mouth daily as needed (constipat). 9/17/20  Yes Sanjiv Sinha MD   pravastatin (PRAVACHOL) 80 MG tablet Take 80 mg by mouth every evening. Take 1 tablet by mouth every evening 5/6/20  Yes Sanjiv Sinha MD   TRADJENTA 5 mg Tab tablet Take 1 tablet (5 mg total) by mouth once daily. 6/3/20  Yes Sanjiv Sinha MD   intravenous infus.pump accesry (500ML PRESSURE INFUSION CUFF MISC) Take 500 mg by mouth. Take 2 tablet in the morning and 1 tablet at night 4/1/20   Sanjiv Sinha MD   SUPREP BOWEL PREP KIT 17.5-3.13-1.6 gram SolR Use as directed 10/12/20   Dianne Gregory NP        Allergies:  Review of patient's allergies indicates:  No Known Allergies     Social History:   Social History     Socioeconomic History    Marital status:      Spouse name: Not on file    Number of  children: Not on file    Years of education: Not on file    Highest education level: Not on file   Occupational History    Not on file   Social Needs    Financial resource strain: Not on file    Food insecurity     Worry: Not on file     Inability: Not on file    Transportation needs     Medical: Not on file     Non-medical: Not on file   Tobacco Use    Smoking status: Former Smoker     Packs/day: 1.00     Types: Cigarettes    Smokeless tobacco: Never Used    Tobacco comment: Quit 2019 mid year   Substance and Sexual Activity    Alcohol use: No    Drug use: Never    Sexual activity: Not Currently   Lifestyle    Physical activity     Days per week: Not on file     Minutes per session: Not on file    Stress: Not on file   Relationships    Social connections     Talks on phone: Not on file     Gets together: Not on file     Attends Jain service: Not on file     Active member of club or organization: Not on file     Attends meetings of clubs or organizations: Not on file     Relationship status: Not on file   Other Topics Concern    Not on file   Social History Narrative    Not on file       Family History:  Family History   Problem Relation Age of Onset    Diabetes Father     Hypertension Father     Heart attack Sister     Colon cancer Sister     Stroke Sister     Colon cancer Brother        ROS: No acute cardiac events, no acute respiratory complaints.     Physical Exam (all patients):    BP (!) 152/83 (BP Location: Left arm, Patient Position: Lying)   Temp 98.1 °F (36.7 °C)   Resp 18   Wt 68.8 kg (151 lb 10.8 oz)   SpO2 97%   Breastfeeding No   BMI 26.04 kg/m²   Lungs: Clear to auscultation bilaterally, respirations unlabored  Heart: Regular rate and rhythm, S1 and S2 normal, no obvious murmurs  Abdomen:         Soft, non-tender, bowel sounds normal, no masses, no organomegaly    Lab Results   Component Value Date     (H) 06/20/2019    HGBA1C 8.9 09/17/2020    BUN 8 06/20/2019      06/20/2019    K 3.6 06/20/2019     06/20/2019        SEDATION PLAN: per anesthesia      History reviewed, vital signs satisfactory, cardiopulmonary status satisfactory, sedation options, risks and plans have been discussed with the patient in front of endoscopy staff.  All their questions were answered and the patient agrees to the sedation procedures as planned and the patient is deemed an appropriate candidate for the sedation as planned.    Procedure explained to patient, informed consent obtained and placed in chart.    Yousuf Fuller  10/19/2020  11:33 AM

## 2020-10-19 NOTE — TELEPHONE ENCOUNTER
Patient's daughter called office to verify which location the procedure was going to be performed at.  Informed her of the [place] location arrival time of 11:00 am- verbalized understanding.

## 2020-10-19 NOTE — ANESTHESIA PREPROCEDURE EVALUATION
10/19/2020  Maryjane Mcintyre is a 70 y.o., female.    Anesthesia Evaluation    I have reviewed the Patient Summary Reports.    I have reviewed the Nursing Notes. I have reviewed the NPO Status.   I have reviewed the Medications.     Review of Systems  Anesthesia Hx:  No problems with previous Anesthesia  Denies Family Hx of Anesthesia complications.   Denies Personal Hx of Anesthesia complications.   Social:  Non-Smoker    Hematology/Oncology:     Oncology Normal     EENT/Dental:EENT/Dental Normal   Cardiovascular:   Hypertension    Pulmonary:  Pulmonary Normal    Renal/:  Renal/ Normal     Hepatic/GI:  Hepatic/GI Normal    Musculoskeletal:  Musculoskeletal Normal    Neurological:  Neurology Normal    Endocrine:   Diabetes, type 2    Dermatological:  Skin Normal    Psych:  Psychiatric Normal           Physical Exam  General:  Well nourished    Airway/Jaw/Neck:  Airway Findings: Mouth Opening: Normal Tongue: Normal  General Airway Assessment: Adult, Average  Mallampati: II  TM Distance: Normal, at least 6 cm      Dental:  Dental Findings: In tact   Chest/Lungs:  Chest/Lungs Findings: Clear to auscultation, Normal Respiratory Rate     Heart/Vascular:  Heart Findings: Rate: Normal  Rhythm: Regular Rhythm        Mental Status:  Mental Status Findings:  Cooperative, Alert and Oriented         Anesthesia Plan  Type of Anesthesia, risks & benefits discussed:  Anesthesia Type:  MAC  Patient's Preference:   Intra-op Monitoring Plan: standard ASA monitors  Intra-op Monitoring Plan Comments:   Post Op Pain Control Plan:   Post Op Pain Control Plan Comments:   Induction:   IV  Beta Blocker:  Patient is on a Beta-Blocker and has received one dose within the past 24 hours (No further documentation required).       Informed Consent: Patient understands risks and agrees with Anesthesia plan.  Questions answered. Anesthesia  consent signed with patient.  ASA Score: 2     Day of Surgery Review of History & Physical: I have interviewed and examined the patient. I have reviewed the patient's H&P dated:  There are no significant changes.          Ready For Surgery From Anesthesia Perspective.

## 2020-10-19 NOTE — TRANSFER OF CARE
Anesthesia Transfer of Care Note    Patient: Maryjane Mcintyre    Procedure(s) Performed: Procedure(s) (LRB):  ESOPHAGOGASTRODUODENOSCOPY (EGD) (N/A)  COLONOSCOPY (N/A)    Patient location: PACU    Anesthesia Type: MAC    Transport from OR: Transported from OR on room air with adequate spontaneous ventilation    Post pain: adequate analgesia    Post assessment: no apparent anesthetic complications    Post vital signs: stable    Level of consciousness: responds to stimulation    Nausea/Vomiting: no nausea/vomiting    Complications: none    Transfer of care protocol was followed      Last vitals:   Visit Vitals  BP (!) 152/83 (BP Location: Left arm, Patient Position: Lying)   Temp 36.7 °C (98.1 °F)   Resp 18   Wt 68.8 kg (151 lb 10.8 oz)   SpO2 97%   Breastfeeding No   BMI 26.04 kg/m²

## 2020-10-19 NOTE — DISCHARGE INSTRUCTIONS

## 2020-10-19 NOTE — PLAN OF CARE
DR LARSON AT BEDSIDE TO SPEAK TO PT. REGARDING RESULTS.  VSS, NO GI BLEEDING, NO ABD. PAIN, NO N/V. PT. DISCHARGED FROM UNIT.

## 2020-10-19 NOTE — ANESTHESIA POSTPROCEDURE EVALUATION
Anesthesia Post Evaluation    Patient: Maryjane Mcintyre    Procedure(s) Performed: Procedure(s) (LRB):  ESOPHAGOGASTRODUODENOSCOPY (EGD) (N/A)  COLONOSCOPY (N/A)    Final Anesthesia Type: MAC    Patient location during evaluation: PACU  Patient participation: Yes- Able to Participate  Level of consciousness: awake and alert and awake  Post-procedure vital signs: reviewed and stable  Pain management: adequate  Airway patency: patent    PONV status at discharge: No PONV  Anesthetic complications: no      Cardiovascular status: blood pressure returned to baseline  Respiratory status: unassisted, spontaneous ventilation and room air  Hydration status: euvolemic  Follow-up not needed.          Vitals Value Taken Time   /83 10/19/20 1110   Temp 36.7 °C (98.1 °F) 10/19/20 1110   Pulse  10/19/20 1214   Resp 18 10/19/20 1110   SpO2 97 % 10/19/20 1110         No case tracking events are documented in the log.      Pain/Michael Score: No data recorded

## 2020-10-19 NOTE — ANESTHESIA RELEASE NOTE
Anesthesia Release from PACU Note    Patient: Maryjane Mcintyre    Procedure(s) Performed: Procedure(s) (LRB):  ESOPHAGOGASTRODUODENOSCOPY (EGD) (N/A)  COLONOSCOPY (N/A)    Anesthesia type: MAC    Post pain: Adequate analgesia    Post assessment: no apparent anesthetic complications, tolerated procedure well and no evidence of recall    Last Vitals:   Visit Vitals  BP (!) 152/83 (BP Location: Left arm, Patient Position: Lying)   Temp 36.7 °C (98.1 °F)   Resp 18   Wt 68.8 kg (151 lb 10.8 oz)   SpO2 97%   Breastfeeding No   BMI 26.04 kg/m²       Post vital signs: stable    Level of consciousness: responds to stimulation    Nausea/Vomiting: no nausea/no vomiting    Complications: none    Airway Patency: patent    Respiratory: unassisted    Cardiovascular: stable and blood pressure at baseline    Hydration: euvolemic

## 2020-10-19 NOTE — OR NURSING
Pt denies anything loose or removable in mouth. Pt reports chipped and missing teeth. Bite block placed gently into mouth while pt awake, no complaints from pt.

## 2020-10-22 LAB
FINAL PATHOLOGIC DIAGNOSIS: NORMAL
GROSS: NORMAL
Lab: NORMAL
MICROSCOPIC EXAM: NORMAL

## 2021-02-04 ENCOUNTER — OFFICE VISIT (OUTPATIENT)
Dept: PODIATRY | Facility: CLINIC | Age: 71
End: 2021-02-04
Payer: MEDICARE

## 2021-02-04 VITALS
WEIGHT: 156.5 LBS | BODY MASS INDEX: 26.72 KG/M2 | DIASTOLIC BLOOD PRESSURE: 80 MMHG | HEART RATE: 88 BPM | SYSTOLIC BLOOD PRESSURE: 147 MMHG | HEIGHT: 64 IN

## 2021-02-04 DIAGNOSIS — L60.2 NAIL DISORDER (ONYCHOGRYPHOSIS): ICD-10-CM

## 2021-02-04 DIAGNOSIS — E11.51 CONTROLLED TYPE 2 DM WITH PERIPHERAL CIRCULATORY DISORDER: ICD-10-CM

## 2021-02-04 DIAGNOSIS — L60.0 INGROWN TOENAIL OF LEFT FOOT: Primary | ICD-10-CM

## 2021-02-04 PROCEDURE — 3052F PR MOST RECENT HEMOGLOBIN A1C LEVEL 8.0 - < 9.0%: ICD-10-PCS | Mod: CPTII,S$GLB,, | Performed by: PODIATRIST

## 2021-02-04 PROCEDURE — 99999 PR PBB SHADOW E&M-EST. PATIENT-LVL IV: CPT | Mod: PBBFAC,,, | Performed by: PODIATRIST

## 2021-02-04 PROCEDURE — 11721 PR DEBRIDEMENT OF NAILS, 6 OR MORE: ICD-10-PCS | Mod: Q8,S$GLB,, | Performed by: PODIATRIST

## 2021-02-04 PROCEDURE — 99213 OFFICE O/P EST LOW 20 MIN: CPT | Mod: 25,S$GLB,, | Performed by: PODIATRIST

## 2021-02-04 PROCEDURE — 3079F DIAST BP 80-89 MM HG: CPT | Mod: CPTII,S$GLB,, | Performed by: PODIATRIST

## 2021-02-04 PROCEDURE — 1159F MED LIST DOCD IN RCRD: CPT | Mod: S$GLB,,, | Performed by: PODIATRIST

## 2021-02-04 PROCEDURE — 1159F PR MEDICATION LIST DOCUMENTED IN MEDICAL RECORD: ICD-10-PCS | Mod: S$GLB,,, | Performed by: PODIATRIST

## 2021-02-04 PROCEDURE — 3077F SYST BP >= 140 MM HG: CPT | Mod: CPTII,S$GLB,, | Performed by: PODIATRIST

## 2021-02-04 PROCEDURE — 1101F PT FALLS ASSESS-DOCD LE1/YR: CPT | Mod: CPTII,S$GLB,, | Performed by: PODIATRIST

## 2021-02-04 PROCEDURE — 3288F PR FALLS RISK ASSESSMENT DOCUMENTED: ICD-10-PCS | Mod: CPTII,S$GLB,, | Performed by: PODIATRIST

## 2021-02-04 PROCEDURE — 1101F PR PT FALLS ASSESS DOC 0-1 FALLS W/OUT INJ PAST YR: ICD-10-PCS | Mod: CPTII,S$GLB,, | Performed by: PODIATRIST

## 2021-02-04 PROCEDURE — 3052F HG A1C>EQUAL 8.0%<EQUAL 9.0%: CPT | Mod: CPTII,S$GLB,, | Performed by: PODIATRIST

## 2021-02-04 PROCEDURE — 3077F PR MOST RECENT SYSTOLIC BLOOD PRESSURE >= 140 MM HG: ICD-10-PCS | Mod: CPTII,S$GLB,, | Performed by: PODIATRIST

## 2021-02-04 PROCEDURE — 11721 DEBRIDE NAIL 6 OR MORE: CPT | Mod: Q8,S$GLB,, | Performed by: PODIATRIST

## 2021-02-04 PROCEDURE — 99213 PR OFFICE/OUTPT VISIT, EST, LEVL III, 20-29 MIN: ICD-10-PCS | Mod: 25,S$GLB,, | Performed by: PODIATRIST

## 2021-02-04 PROCEDURE — 3079F PR MOST RECENT DIASTOLIC BLOOD PRESSURE 80-89 MM HG: ICD-10-PCS | Mod: CPTII,S$GLB,, | Performed by: PODIATRIST

## 2021-02-04 PROCEDURE — 3008F PR BODY MASS INDEX (BMI) DOCUMENTED: ICD-10-PCS | Mod: CPTII,S$GLB,, | Performed by: PODIATRIST

## 2021-02-04 PROCEDURE — 99999 PR PBB SHADOW E&M-EST. PATIENT-LVL IV: ICD-10-PCS | Mod: PBBFAC,,, | Performed by: PODIATRIST

## 2021-02-04 PROCEDURE — 3288F FALL RISK ASSESSMENT DOCD: CPT | Mod: CPTII,S$GLB,, | Performed by: PODIATRIST

## 2021-02-04 PROCEDURE — 3008F BODY MASS INDEX DOCD: CPT | Mod: CPTII,S$GLB,, | Performed by: PODIATRIST

## 2021-05-06 ENCOUNTER — OFFICE VISIT (OUTPATIENT)
Dept: PODIATRY | Facility: CLINIC | Age: 71
End: 2021-05-06
Payer: MEDICARE

## 2021-05-06 VITALS — WEIGHT: 160.06 LBS | HEIGHT: 64 IN | BODY MASS INDEX: 27.33 KG/M2

## 2021-05-06 DIAGNOSIS — L60.0 INGROWN TOENAIL OF LEFT FOOT: Primary | ICD-10-CM

## 2021-05-06 DIAGNOSIS — L60.2 NAIL DISORDER (ONYCHOGRYPHOSIS): ICD-10-CM

## 2021-05-06 DIAGNOSIS — I77.1 ARTERIAL INSUFFICIENCY: ICD-10-CM

## 2021-05-06 DIAGNOSIS — E11.51 CONTROLLED TYPE 2 DM WITH PERIPHERAL CIRCULATORY DISORDER: ICD-10-CM

## 2021-05-06 PROCEDURE — 3052F HG A1C>EQUAL 8.0%<EQUAL 9.0%: CPT | Mod: CPTII,S$GLB,, | Performed by: PODIATRIST

## 2021-05-06 PROCEDURE — 1101F PR PT FALLS ASSESS DOC 0-1 FALLS W/OUT INJ PAST YR: ICD-10-PCS | Mod: CPTII,S$GLB,, | Performed by: PODIATRIST

## 2021-05-06 PROCEDURE — 1101F PT FALLS ASSESS-DOCD LE1/YR: CPT | Mod: CPTII,S$GLB,, | Performed by: PODIATRIST

## 2021-05-06 PROCEDURE — 11721 DEBRIDE NAIL 6 OR MORE: CPT | Mod: Q8,S$GLB,, | Performed by: PODIATRIST

## 2021-05-06 PROCEDURE — 3052F PR MOST RECENT HEMOGLOBIN A1C LEVEL 8.0 - < 9.0%: ICD-10-PCS | Mod: CPTII,S$GLB,, | Performed by: PODIATRIST

## 2021-05-06 PROCEDURE — 3288F FALL RISK ASSESSMENT DOCD: CPT | Mod: CPTII,S$GLB,, | Performed by: PODIATRIST

## 2021-05-06 PROCEDURE — 3288F PR FALLS RISK ASSESSMENT DOCUMENTED: ICD-10-PCS | Mod: CPTII,S$GLB,, | Performed by: PODIATRIST

## 2021-05-06 PROCEDURE — 1159F PR MEDICATION LIST DOCUMENTED IN MEDICAL RECORD: ICD-10-PCS | Mod: S$GLB,,, | Performed by: PODIATRIST

## 2021-05-06 PROCEDURE — 99999 PR PBB SHADOW E&M-EST. PATIENT-LVL III: CPT | Mod: PBBFAC,,, | Performed by: PODIATRIST

## 2021-05-06 PROCEDURE — 3008F PR BODY MASS INDEX (BMI) DOCUMENTED: ICD-10-PCS | Mod: CPTII,S$GLB,, | Performed by: PODIATRIST

## 2021-05-06 PROCEDURE — 99213 PR OFFICE/OUTPT VISIT, EST, LEVL III, 20-29 MIN: ICD-10-PCS | Mod: 25,S$GLB,, | Performed by: PODIATRIST

## 2021-05-06 PROCEDURE — 1159F MED LIST DOCD IN RCRD: CPT | Mod: S$GLB,,, | Performed by: PODIATRIST

## 2021-05-06 PROCEDURE — 11721 PR DEBRIDEMENT OF NAILS, 6 OR MORE: ICD-10-PCS | Mod: Q8,S$GLB,, | Performed by: PODIATRIST

## 2021-05-06 PROCEDURE — 3008F BODY MASS INDEX DOCD: CPT | Mod: CPTII,S$GLB,, | Performed by: PODIATRIST

## 2021-05-06 PROCEDURE — 99213 OFFICE O/P EST LOW 20 MIN: CPT | Mod: 25,S$GLB,, | Performed by: PODIATRIST

## 2021-05-06 PROCEDURE — 99999 PR PBB SHADOW E&M-EST. PATIENT-LVL III: ICD-10-PCS | Mod: PBBFAC,,, | Performed by: PODIATRIST

## 2021-09-16 LAB — BCS RECOMMENDATION EXT: NORMAL

## 2022-04-12 ENCOUNTER — LAB VISIT (OUTPATIENT)
Dept: LAB | Facility: HOSPITAL | Age: 72
End: 2022-04-12
Attending: INTERNAL MEDICINE
Payer: MEDICARE

## 2022-04-12 ENCOUNTER — OFFICE VISIT (OUTPATIENT)
Dept: INTERNAL MEDICINE | Facility: CLINIC | Age: 72
End: 2022-04-12
Payer: MEDICARE

## 2022-04-12 VITALS
BODY MASS INDEX: 25.67 KG/M2 | HEART RATE: 89 BPM | HEIGHT: 64 IN | RESPIRATION RATE: 18 BRPM | WEIGHT: 150.38 LBS | SYSTOLIC BLOOD PRESSURE: 124 MMHG | DIASTOLIC BLOOD PRESSURE: 68 MMHG | OXYGEN SATURATION: 97 % | TEMPERATURE: 98 F

## 2022-04-12 DIAGNOSIS — Z79.4 UNCONTROLLED TYPE 2 DIABETES MELLITUS WITH HYPERGLYCEMIA, WITH LONG-TERM CURRENT USE OF INSULIN: ICD-10-CM

## 2022-04-12 DIAGNOSIS — E78.5 HYPERLIPIDEMIA LDL GOAL <70: ICD-10-CM

## 2022-04-12 DIAGNOSIS — M25.562 CHRONIC PAIN OF BOTH KNEES: ICD-10-CM

## 2022-04-12 DIAGNOSIS — K21.9 CHRONIC GERD: ICD-10-CM

## 2022-04-12 DIAGNOSIS — G89.29 CHRONIC PAIN OF BOTH KNEES: ICD-10-CM

## 2022-04-12 DIAGNOSIS — E11.65 UNCONTROLLED TYPE 2 DIABETES MELLITUS WITH HYPERGLYCEMIA, WITH LONG-TERM CURRENT USE OF INSULIN: ICD-10-CM

## 2022-04-12 DIAGNOSIS — I10 HYPERTENSION GOAL BP (BLOOD PRESSURE) < 130/80: Primary | ICD-10-CM

## 2022-04-12 DIAGNOSIS — I10 HYPERTENSION GOAL BP (BLOOD PRESSURE) < 130/80: ICD-10-CM

## 2022-04-12 DIAGNOSIS — M25.561 CHRONIC PAIN OF BOTH KNEES: ICD-10-CM

## 2022-04-12 LAB
ALBUMIN SERPL BCP-MCNC: 4.1 G/DL (ref 3.5–5.2)
ALP SERPL-CCNC: 43 U/L (ref 55–135)
ALT SERPL W/O P-5'-P-CCNC: 19 U/L (ref 10–44)
ANION GAP SERPL CALC-SCNC: 12 MMOL/L (ref 8–16)
AST SERPL-CCNC: 15 U/L (ref 10–40)
BILIRUB SERPL-MCNC: 0.7 MG/DL (ref 0.1–1)
BUN SERPL-MCNC: 18 MG/DL (ref 8–23)
CALCIUM SERPL-MCNC: 10.1 MG/DL (ref 8.7–10.5)
CHLORIDE SERPL-SCNC: 104 MMOL/L (ref 95–110)
CHOLEST SERPL-MCNC: 140 MG/DL (ref 120–199)
CHOLEST/HDLC SERPL: 3.2 {RATIO} (ref 2–5)
CO2 SERPL-SCNC: 24 MMOL/L (ref 23–29)
CREAT SERPL-MCNC: 0.8 MG/DL (ref 0.5–1.4)
EST. GFR  (AFRICAN AMERICAN): >60 ML/MIN/1.73 M^2
EST. GFR  (NON AFRICAN AMERICAN): >60 ML/MIN/1.73 M^2
ESTIMATED AVG GLUCOSE: 183 MG/DL (ref 68–131)
GLUCOSE SERPL-MCNC: 151 MG/DL (ref 70–110)
HBA1C MFR BLD: 8 % (ref 4–5.6)
HDLC SERPL-MCNC: 44 MG/DL (ref 40–75)
HDLC SERPL: 31.4 % (ref 20–50)
LDLC SERPL CALC-MCNC: 70.4 MG/DL (ref 63–159)
NONHDLC SERPL-MCNC: 96 MG/DL
POTASSIUM SERPL-SCNC: 3.7 MMOL/L (ref 3.5–5.1)
PROT SERPL-MCNC: 7.5 G/DL (ref 6–8.4)
SODIUM SERPL-SCNC: 140 MMOL/L (ref 136–145)
TRIGL SERPL-MCNC: 128 MG/DL (ref 30–150)

## 2022-04-12 PROCEDURE — 80053 COMPREHEN METABOLIC PANEL: CPT | Performed by: INTERNAL MEDICINE

## 2022-04-12 PROCEDURE — 3074F PR MOST RECENT SYSTOLIC BLOOD PRESSURE < 130 MM HG: ICD-10-PCS | Mod: CPTII,S$GLB,, | Performed by: INTERNAL MEDICINE

## 2022-04-12 PROCEDURE — 99214 OFFICE O/P EST MOD 30 MIN: CPT | Mod: S$GLB,,, | Performed by: INTERNAL MEDICINE

## 2022-04-12 PROCEDURE — 3074F SYST BP LT 130 MM HG: CPT | Mod: CPTII,S$GLB,, | Performed by: INTERNAL MEDICINE

## 2022-04-12 PROCEDURE — 36415 COLL VENOUS BLD VENIPUNCTURE: CPT | Performed by: INTERNAL MEDICINE

## 2022-04-12 PROCEDURE — 3008F BODY MASS INDEX DOCD: CPT | Mod: CPTII,S$GLB,, | Performed by: INTERNAL MEDICINE

## 2022-04-12 PROCEDURE — 3078F PR MOST RECENT DIASTOLIC BLOOD PRESSURE < 80 MM HG: ICD-10-PCS | Mod: CPTII,S$GLB,, | Performed by: INTERNAL MEDICINE

## 2022-04-12 PROCEDURE — 99999 PR PBB SHADOW E&M-EST. PATIENT-LVL V: CPT | Mod: PBBFAC,,, | Performed by: INTERNAL MEDICINE

## 2022-04-12 PROCEDURE — 3052F HG A1C>EQUAL 8.0%<EQUAL 9.0%: CPT | Mod: CPTII,S$GLB,, | Performed by: INTERNAL MEDICINE

## 2022-04-12 PROCEDURE — 83036 HEMOGLOBIN GLYCOSYLATED A1C: CPT | Performed by: INTERNAL MEDICINE

## 2022-04-12 PROCEDURE — 3078F DIAST BP <80 MM HG: CPT | Mod: CPTII,S$GLB,, | Performed by: INTERNAL MEDICINE

## 2022-04-12 PROCEDURE — 3288F PR FALLS RISK ASSESSMENT DOCUMENTED: ICD-10-PCS | Mod: CPTII,S$GLB,, | Performed by: INTERNAL MEDICINE

## 2022-04-12 PROCEDURE — 3288F FALL RISK ASSESSMENT DOCD: CPT | Mod: CPTII,S$GLB,, | Performed by: INTERNAL MEDICINE

## 2022-04-12 PROCEDURE — 1101F PR PT FALLS ASSESS DOC 0-1 FALLS W/OUT INJ PAST YR: ICD-10-PCS | Mod: CPTII,S$GLB,, | Performed by: INTERNAL MEDICINE

## 2022-04-12 PROCEDURE — 1101F PT FALLS ASSESS-DOCD LE1/YR: CPT | Mod: CPTII,S$GLB,, | Performed by: INTERNAL MEDICINE

## 2022-04-12 PROCEDURE — 1159F PR MEDICATION LIST DOCUMENTED IN MEDICAL RECORD: ICD-10-PCS | Mod: CPTII,S$GLB,, | Performed by: INTERNAL MEDICINE

## 2022-04-12 PROCEDURE — 4010F PR ACE/ARB THEARPY RXD/TAKEN: ICD-10-PCS | Mod: CPTII,S$GLB,, | Performed by: INTERNAL MEDICINE

## 2022-04-12 PROCEDURE — 1159F MED LIST DOCD IN RCRD: CPT | Mod: CPTII,S$GLB,, | Performed by: INTERNAL MEDICINE

## 2022-04-12 PROCEDURE — 80061 LIPID PANEL: CPT | Performed by: INTERNAL MEDICINE

## 2022-04-12 PROCEDURE — 99214 PR OFFICE/OUTPT VISIT, EST, LEVL IV, 30-39 MIN: ICD-10-PCS | Mod: S$GLB,,, | Performed by: INTERNAL MEDICINE

## 2022-04-12 PROCEDURE — 3052F PR MOST RECENT HEMOGLOBIN A1C LEVEL 8.0 - < 9.0%: ICD-10-PCS | Mod: CPTII,S$GLB,, | Performed by: INTERNAL MEDICINE

## 2022-04-12 PROCEDURE — 4010F ACE/ARB THERAPY RXD/TAKEN: CPT | Mod: CPTII,S$GLB,, | Performed by: INTERNAL MEDICINE

## 2022-04-12 PROCEDURE — 1125F AMNT PAIN NOTED PAIN PRSNT: CPT | Mod: CPTII,S$GLB,, | Performed by: INTERNAL MEDICINE

## 2022-04-12 PROCEDURE — 3008F PR BODY MASS INDEX (BMI) DOCUMENTED: ICD-10-PCS | Mod: CPTII,S$GLB,, | Performed by: INTERNAL MEDICINE

## 2022-04-12 PROCEDURE — 1160F RVW MEDS BY RX/DR IN RCRD: CPT | Mod: CPTII,S$GLB,, | Performed by: INTERNAL MEDICINE

## 2022-04-12 PROCEDURE — 99999 PR PBB SHADOW E&M-EST. PATIENT-LVL V: ICD-10-PCS | Mod: PBBFAC,,, | Performed by: INTERNAL MEDICINE

## 2022-04-12 PROCEDURE — 1160F PR REVIEW ALL MEDS BY PRESCRIBER/CLIN PHARMACIST DOCUMENTED: ICD-10-PCS | Mod: CPTII,S$GLB,, | Performed by: INTERNAL MEDICINE

## 2022-04-12 PROCEDURE — 1125F PR PAIN SEVERITY QUANTIFIED, PAIN PRESENT: ICD-10-PCS | Mod: CPTII,S$GLB,, | Performed by: INTERNAL MEDICINE

## 2022-04-12 RX ORDER — CELECOXIB 200 MG/1
200 CAPSULE ORAL DAILY
Qty: 90 CAPSULE | Refills: 1 | Status: SHIPPED | OUTPATIENT
Start: 2022-04-12 | End: 2022-12-09

## 2022-04-12 RX ORDER — LABETALOL 200 MG/1
200 TABLET, FILM COATED ORAL 2 TIMES DAILY
Qty: 180 TABLET | Refills: 1 | Status: SHIPPED | OUTPATIENT
Start: 2022-04-12 | End: 2022-05-25 | Stop reason: SDUPTHER

## 2022-04-12 RX ORDER — OMEPRAZOLE 20 MG/1
20 CAPSULE, DELAYED RELEASE ORAL DAILY
Qty: 90 CAPSULE | Refills: 1 | Status: SHIPPED | OUTPATIENT
Start: 2022-04-12 | End: 2022-08-17

## 2022-04-12 NOTE — PROGRESS NOTES
Subjective:       Patient ID: Maryjane Mcintyre is a 71 y.o. female.    Chief Complaint: Establish Care    71 y.o. Black or  female     Patient presents with:  Establish Care    HPI: Here today to establish care. She is here with her daughter.   She is new to me.   Her previous PCP was Dr. Sanjiv Sinha.   HTN--stable on amlodipine, losartan, labetalol and HCTZ. She needs refills on labetalol.   DM--compliant with Trijardy and insulin. She checks her glucoses regularly. Occasionally it is high. She is fasting for labs today.                     HGBA1C                   8.2                 11/15/2021            PAD--compliant with cilostazol. She has not seen cardiology in a while. She denies symptoms.   HLD--compliant with pravastatin.                       LDLCALC                  65.8                09/11/2020                         CHOL                     124                 09/11/2020                 TRIG                     76                  09/11/2020                 HDL                      43                  09/11/2020                 ALT                      21                  06/20/2019                 AST                      15                  06/20/2019                 NA                       138                 06/20/2019                 K                        3.6                 06/20/2019                 CL                       105                 06/20/2019                 CREATININE               0.9                 06/20/2019                 BUN                      8                   06/20/2019                 CO2                      25                  06/20/2019            GERD--needs refills on omeprazole.   She takes celecoxib for bilateral knee pain. She needs refills. She has been under the care of orthopedics at [facility] but has not seen them recently. She is thinking about going back for an injection.     Past Medical History:  Arthritis  Diabetes  "mellitus, type 2  GERD (gastroesophageal reflux disease)  Gout  Hyperlipidemia  Hypertension  Onychomycosis    Past Surgical History:  Cardiac Echo  10/19/2020: COLONOSCOPY; N/A      Comment:  Procedure: COLONOSCOPY;  Surgeon: Yousuf Fuller MD;  Location: [facility];  Service: Endoscopy;                 Laterality: N/A;  10/19/2020: ESOPHAGOGASTRODUODENOSCOPY; N/A      Comment:  Procedure: ESOPHAGOGASTRODUODENOSCOPY (EGD);  Surgeon:                Yousuf Fuller MD;  Location: [facility];  Service:                Endoscopy;  Laterality: N/A;  GALLBLADDER SURGERY  HYSTERECTOMY    Review of patient's family history indicates:  Problem: Diabetes      Relation: Father          Age of Onset: (Not Specified)  Problem: Hypertension      Relation: Father          Age of Onset: (Not Specified)  Problem: Heart attack      Relation: Sister          Age of Onset: (Not Specified)  Problem: Colon cancer      Relation: Sister          Age of Onset: (Not Specified)  Problem: Stroke      Relation: Sister          Age of Onset: (Not Specified)  Problem: Colon cancer      Relation: Brother          Age of Onset: (Not Specified)      Current Outpatient Medications on File Prior to Visit:  acetaminophen (TYLENOL) 160 mg/5 mL Liqd, Take 100 mg by mouth daily as needed. Take one tablet by mouth once daily as needed, Disp: , Rfl:   amLODIPine (NORVASC) 10 MG tablet, TAKE 1 TABLET(10 MG) BY MOUTH EVERY DAY, Disp: 90 tablet, Rfl: 1  BD MAGDALENA 2ND GEN PEN NEEDLE 32 gauge x 5/32" Ndle, USE TWICE DAILY FOR INSULIN PEN, Disp: 100 each, Rfl: 1  cholecalciferol, vitamin D3, 5,000 unit capsule, Take 5,000 Units by mouth once daily., Disp: , Rfl: 1  cilostazoL (PLETAL) 50 MG Tab, TAKE 1 TABLET(50 MG) BY MOUTH TWICE DAILY, Disp: 60 tablet, Rfl: 9  COMBIVENT RESPIMAT  mcg/actuation inhaler, INHALE 1 PUFF INTO THE LUNGS EVERY 6 HOURS AS NEEDED FOR WHEEZING AND SHORTNESS OF BREATH, Disp: 4 g, Rfl: 3  fluticasone propionate " (FLONASE) 50 mcg/actuation nasal spray, 2 sprays both nostrils daily prn, Disp: 16 g, Rfl: 3  hydroCHLOROthiazide (HYDRODIURIL) 12.5 MG Tab, TAKE 1 TABLET(12.5 MG) BY MOUTH EVERY DAY, Disp: 90 tablet, Rfl: 1  insulin (LANTUS SOLOSTAR U-100 INSULIN) glargine 100 units/mL (3mL) SubQ pen, Inject 36 Units into the skin every evening., Disp: 30 mL, Rfl: 3  intravenous infus.pump accesry (500ML PRESSURE INFUSION CUFF MISC), Take 500 mg by mouth. Take 2 tablet in the morning and 1 tablet at night, Disp: , Rfl:   losartan (COZAAR) 100 MG tablet, TAKE 1 TABLET BY MOUTH EVERY DAY, Disp: 90 tablet, Rfl: 1  polyethylene glycol (GLYCOLAX) 17 gram PwPk, Take 17 g by mouth daily as needed (constipat)., Disp: 30 each, Rfl: 3  pravastatin (PRAVACHOL) 80 MG tablet, TAKE 1 TABLET(80 MG) BY MOUTH EVERY EVENING, Disp: 90 tablet, Rfl: 1  TRIJARDY XR 10-5-1,000 mg TBph, TAKE 1 TABLET BY MOUTH DAILY, Disp: 90 tablet, Rfl: 1  celecoxib (CELEBREX) 200 MG capsule, TAKE 1 CAPSULE BY MOUTH EVERY DAY WITH MEALS, Disp: , Rfl:     Allergies:   Review of patient's allergies indicates:  No Known Allergies            Review of Systems   Constitutional: Negative for fatigue, fever and unexpected weight change.   HENT: Negative for trouble swallowing and voice change.    Eyes: Negative for visual disturbance.   Respiratory: Negative for cough and shortness of breath.    Cardiovascular: Negative for chest pain, palpitations and leg swelling.   Gastrointestinal: Positive for reflux. Negative for abdominal pain, blood in stool, constipation and diarrhea.   Genitourinary: Negative for difficulty urinating and dysuria.   Musculoskeletal: Positive for arthralgias. Negative for back pain.   Neurological: Negative for dizziness.   Psychiatric/Behavioral: Negative for dysphoric mood and sleep disturbance. The patient is not nervous/anxious.          Objective:      Physical Exam  Vitals reviewed.   Constitutional:       General: She is not in acute distress.      Appearance: She is well-developed. She is not ill-appearing.   Eyes:      General: No scleral icterus.  Cardiovascular:      Rate and Rhythm: Normal rate and regular rhythm.   Pulmonary:      Effort: Pulmonary effort is normal. No respiratory distress.      Breath sounds: Normal breath sounds. No wheezing or rales.   Abdominal:      General: Bowel sounds are normal.      Palpations: Abdomen is soft.   Musculoskeletal:      Right lower leg: No edema.      Left lower leg: No edema.   Skin:     General: Skin is warm and dry.   Neurological:      Mental Status: She is alert and oriented to person, place, and time.   Psychiatric:         Behavior: Behavior normal.         Assessment:       Problem List Items Addressed This Visit     Hypertension goal BP (blood pressure) < 130/80 - Primary    Relevant Medications    labetaloL (NORMODYNE) 200 MG tablet    Other Relevant Orders    Comprehensive Metabolic Panel    Lipid Panel      Other Visit Diagnoses     Uncontrolled diabetes mellitus type 2 with peripheral artery disease        Relevant Orders    Comprehensive Metabolic Panel    Hemoglobin A1C    Lipid Panel    Uncontrolled type 2 diabetes mellitus with hyperglycemia, with long-term current use of insulin        Relevant Orders    Comprehensive Metabolic Panel    Hemoglobin A1C    Lipid Panel    Hyperlipidemia LDL goal <70        Relevant Orders    Comprehensive Metabolic Panel    Lipid Panel    Chronic GERD        Relevant Medications    omeprazole (PRILOSEC) 20 MG capsule    Chronic pain of both knees        Relevant Medications    celecoxib (CELEBREX) 200 MG capsule          Plan:       Maryjane was seen today for establish care.    Diagnoses and all orders for this visit:    Hypertension goal BP (blood pressure) < 130/80  -     Comprehensive Metabolic Panel; Standing  -     Lipid Panel; Standing  -     Refill labetaloL (NORMODYNE) 200 MG tablet; Take 1 tablet (200 mg total) by mouth 2 (two) times daily.    Uncontrolled  type 2 diabetes mellitus with hyperglycemia, with long-term current use of insulin  -     Comprehensive Metabolic Panel; Standing  -     Hemoglobin A1C; Standing  -     Lipid Panel; Standing    Uncontrolled diabetes mellitus type 2 with peripheral artery disease  -     Comprehensive Metabolic Panel; Standing  -     Hemoglobin A1C; Standing  -     Lipid Panel; Standing    Hyperlipidemia LDL goal <70  -     Comprehensive Metabolic Panel; Standing  -     Lipid Panel; Standing    Chronic GERD  -     Refill omeprazole (PRILOSEC) 20 MG capsule; Take 1 capsule (20 mg total) by mouth once daily.    Chronic pain of both knees  -     Refill celecoxib (CELEBREX) 200 MG capsule; Take 1 capsule (200 mg total) by mouth once daily.  -     F/U with orthopedics     Labs today.     Obtain records.

## 2022-04-18 ENCOUNTER — PATIENT OUTREACH (OUTPATIENT)
Dept: ADMINISTRATIVE | Facility: HOSPITAL | Age: 72
End: 2022-04-18
Payer: MEDICARE

## 2022-04-18 NOTE — PROGRESS NOTES
Uploaded [place] MAMMOGRAM 4/12/2022 to media.  Manually hyper-linked MAMMOGRAM 11/14/2019 & 9/16/2021   Manually uploaded and hyper-linked HEPATITIS C 6/26/2020    Uploaded [place] DM EYE EXAM 4/12/2022 ; faxed to [facility] 1x to request. Remind me set 1 week.

## 2022-04-19 ENCOUNTER — OFFICE VISIT (OUTPATIENT)
Dept: PODIATRY | Facility: CLINIC | Age: 72
End: 2022-04-19
Payer: MEDICARE

## 2022-04-19 VITALS — WEIGHT: 150.38 LBS | BODY MASS INDEX: 25.67 KG/M2 | HEIGHT: 64 IN

## 2022-04-19 DIAGNOSIS — L60.0 INGROWN TOENAIL OF LEFT FOOT: Primary | ICD-10-CM

## 2022-04-19 DIAGNOSIS — L60.2 NAIL DISORDER (ONYCHOGRYPHOSIS): ICD-10-CM

## 2022-04-19 DIAGNOSIS — E11.51 CONTROLLED TYPE 2 DM WITH PERIPHERAL CIRCULATORY DISORDER: ICD-10-CM

## 2022-04-19 PROCEDURE — 1159F PR MEDICATION LIST DOCUMENTED IN MEDICAL RECORD: ICD-10-PCS | Mod: CPTII,S$GLB,, | Performed by: PODIATRIST

## 2022-04-19 PROCEDURE — 1159F MED LIST DOCD IN RCRD: CPT | Mod: CPTII,S$GLB,, | Performed by: PODIATRIST

## 2022-04-19 PROCEDURE — 3052F PR MOST RECENT HEMOGLOBIN A1C LEVEL 8.0 - < 9.0%: ICD-10-PCS | Mod: CPTII,S$GLB,, | Performed by: PODIATRIST

## 2022-04-19 PROCEDURE — 99999 PR PBB SHADOW E&M-EST. PATIENT-LVL III: CPT | Mod: PBBFAC,,, | Performed by: PODIATRIST

## 2022-04-19 PROCEDURE — 1101F PR PT FALLS ASSESS DOC 0-1 FALLS W/OUT INJ PAST YR: ICD-10-PCS | Mod: CPTII,S$GLB,, | Performed by: PODIATRIST

## 2022-04-19 PROCEDURE — 3008F PR BODY MASS INDEX (BMI) DOCUMENTED: ICD-10-PCS | Mod: CPTII,S$GLB,, | Performed by: PODIATRIST

## 2022-04-19 PROCEDURE — 99213 OFFICE O/P EST LOW 20 MIN: CPT | Mod: 25,S$GLB,, | Performed by: PODIATRIST

## 2022-04-19 PROCEDURE — 4010F PR ACE/ARB THEARPY RXD/TAKEN: ICD-10-PCS | Mod: CPTII,S$GLB,, | Performed by: PODIATRIST

## 2022-04-19 PROCEDURE — 11721 DEBRIDE NAIL 6 OR MORE: CPT | Mod: S$GLB,,, | Performed by: PODIATRIST

## 2022-04-19 PROCEDURE — 3052F HG A1C>EQUAL 8.0%<EQUAL 9.0%: CPT | Mod: CPTII,S$GLB,, | Performed by: PODIATRIST

## 2022-04-19 PROCEDURE — 3288F FALL RISK ASSESSMENT DOCD: CPT | Mod: CPTII,S$GLB,, | Performed by: PODIATRIST

## 2022-04-19 PROCEDURE — 1101F PT FALLS ASSESS-DOCD LE1/YR: CPT | Mod: CPTII,S$GLB,, | Performed by: PODIATRIST

## 2022-04-19 PROCEDURE — 99213 PR OFFICE/OUTPT VISIT, EST, LEVL III, 20-29 MIN: ICD-10-PCS | Mod: 25,S$GLB,, | Performed by: PODIATRIST

## 2022-04-19 PROCEDURE — 99999 PR PBB SHADOW E&M-EST. PATIENT-LVL III: ICD-10-PCS | Mod: PBBFAC,,, | Performed by: PODIATRIST

## 2022-04-19 PROCEDURE — 1126F AMNT PAIN NOTED NONE PRSNT: CPT | Mod: CPTII,S$GLB,, | Performed by: PODIATRIST

## 2022-04-19 PROCEDURE — 1126F PR PAIN SEVERITY QUANTIFIED, NO PAIN PRESENT: ICD-10-PCS | Mod: CPTII,S$GLB,, | Performed by: PODIATRIST

## 2022-04-19 PROCEDURE — 3288F PR FALLS RISK ASSESSMENT DOCUMENTED: ICD-10-PCS | Mod: CPTII,S$GLB,, | Performed by: PODIATRIST

## 2022-04-19 PROCEDURE — 4010F ACE/ARB THERAPY RXD/TAKEN: CPT | Mod: CPTII,S$GLB,, | Performed by: PODIATRIST

## 2022-04-19 PROCEDURE — 11721 PR DEBRIDEMENT OF NAILS, 6 OR MORE: ICD-10-PCS | Mod: S$GLB,,, | Performed by: PODIATRIST

## 2022-04-19 PROCEDURE — 3008F BODY MASS INDEX DOCD: CPT | Mod: CPTII,S$GLB,, | Performed by: PODIATRIST

## 2022-04-19 NOTE — PROGRESS NOTES
"      [facility]  PODIATRIC MEDICINE AND SURGERY      CHIEF COMPLAINT  Chief Complaint   Patient presents with    Nail Care     Coming in for nail care, rates pain 0/10, diabetic, wearing socks and shoes, last seen PCP Dr. Dorado on 04/12/2022.     HPI    SUBJECTIVE: Maryjane Mcintyre is a 71 y.o. female who  has a past medical history of Arthritis, Diabetes mellitus, type 2, GERD (gastroesophageal reflux disease), Gout, Hyperlipidemia, Hypertension, and Onychomycosis. Joycepresents to clinic for high risk diabetic foot exam and care.  Maryjane denies numbness, burning, and/or tingling sensations in their feet.  She complains about thickened, elongated, painful toenails.  She admits to history of tobacco abuse.     She complains about painful ingrown toenail left great toenail. This is a chronic issue. Relief is obtained with trimming nails.Patient has no other pedal complaints at this time      HgA1c:   Hemoglobin A1C   Date Value Ref Range Status   04/12/2022 8.0 (H) 4.0 - 5.6 % Final     Comment:     ADA Screening Guidelines:  5.7-6.4%  Consistent with prediabetes  >or=6.5%  Consistent with diabetes    High levels of fetal hemoglobin interfere with the HbA1C  assay. Heterozygous hemoglobin variants (HbS, HgC, etc)do  not significantly interfere with this assay.   However, presence of multiple variants may affect accuracy.     11/15/2021 8.2 % Final   07/13/2021 9.6 % Final   03/11/2021 8.7 % Final       REVIEW OF SYSTEMS  General: Denies any fever or chills  Chest: Denies shortness of breath, wheezing, coughing, or sputum production  Heart: Denies chest pain.  As noted above and per history of current illness above, otherwise negative in the remainder of the 14 systems.     PHYSICAL EXAM  Vitals:    04/19/22 1005   Weight: 68.2 kg (150 lb 5.7 oz)   Height: 5' 4" (1.626 m)   PainSc: 0-No pain   PainLoc: Foot       GEN:  This patient is well-developed, well-nourished and appears stated age, well-oriented " to person, place and time, and cooperative and pleasant on today's visit.      LOWER EXTREMITY PHYSICAL EXAMINATION   VASCULAR  DP pedal pulse 1/4 RIGHT, LEFT1/4   PT pedal pulse 2/4 RIGHT, LEFT2/4  Capillary refill time immediate to the toes.   Feet are warm to the touch. Skin temperature warm to warm from proximally to distally   There are no varicosities, telangiectasias noted to bilateral foot and ankle regions.   There are no ecchymoses noted to bilateral foot and ankle regions.   There is no gross lower extremity edema.    DERMATOLOGIC  Skin moist with healthy texture and turgor.  Thickened, dystrophic, elongated toenails with subungal debris 1-5 b/l   Incurvated medial border LEFT hallux, no acute SOI  There are no open ulcerations, lacerations, or fissures to bilateral foot and ankle regions. There are no signs of infection as there is no erythema, no proximal-extending lymphangiitis, no fluctuance, or crepitus noted on palpation to bilateral foot and ankle regions.   There is no interdigital maceration.     NEUROLOGIC  Protective sensation intact at 10/10 sites upon examination with [place] Weinsten 5.07 g monofilament.  Propioception intact at 1st MTPJ b/l.  Achilles and patellar deep tendon reflexes intact  Babinski reflex absent    ORTHOPEDIC/BIOMECHANICAL  No symptomatic structural abnormalities noted  Muscle strength AT/EHL/EDL/PT: 5/5; Achilles/Gastroc/Soleus: 5/5; PB/PL: 5/5 Muscle tone is normal.  Ankle joint ROM limited DF/PF, non-crepitus  STJ ROM  inv/ev, non crepitus       ASSESSMENT  Encounter Diagnoses   Name Primary?    Ingrown toenail of left foot Yes    Nail disorder (onychogryphosis)     Controlled type 2 DM with peripheral circulatory disorder          Plan:  -Discuss presenting problems, etiology, pathologic processes and management options with patient today.   -I counseled the patient on their conditions, their implications and medical management. An in depth discussion on diabetic  management, risk prevention, amputation prevention verbally and provided educational literature in written format.    -With patient's permission, the elongated onychomycotic toenails, as outlined in the physical examination, were sharply debrided with a double action nail nipper to their soft tissue attachment. If indicated, the nails were then smoothed down in thickness with a mechanical rotary sherron and/or eros board to facilitate in further debridement removing all offending nail and subungual debris.    Utilizing sterile toenail clippers I aggressively trimmed the offending nail border approximately 3 mm from its edge and carried the nail plate incision down at an angle in order to wedge out the offending cryptotic portion of the nail plate. The offending border was then removed in toto. The remaining nail was grinded down with an electric  down to nail bed. Minimal blood was drawn. Applied betadine and covered with band-aid. Patient tolerated the procedure well and related significant relief.      - Shoe inspection. Diabetic Foot Education. Patient reminded of the importance of good nutrition and blood sugar control to help prevent podiatric complications of diabetes. Patient instructed on proper foot hygeine. We discussed wearing proper shoe gear, daily foot inspections, never walking without protective shoe gear, never putting sharp instruments to feet, routine podiatric visits annually/semi annually or sooner if symptoms arise    Disclaimer: This note was partially prepared using a voice recognition system and is likely to have sound alike errors within the text.        Future Appointments   Date Time Provider Department Center   7/19/2022  9:40 AM Allie Cartagena DPM [facility] POD [place]       Report Electronically Signed By:     Allie Cartagena DPM   Podiatry  [facility]   4/26/2022

## 2022-05-05 RX ORDER — PRAVASTATIN SODIUM 80 MG/1
TABLET ORAL
Qty: 90 TABLET | Refills: 1 | OUTPATIENT
Start: 2022-05-05

## 2022-05-06 RX ORDER — PRAVASTATIN SODIUM 80 MG/1
TABLET ORAL
Qty: 90 TABLET | Refills: 1 | Status: SHIPPED | OUTPATIENT
Start: 2022-05-06 | End: 2022-08-17

## 2022-05-06 NOTE — TELEPHONE ENCOUNTER
No new care gaps identified.  [facility] Embedded Care Gaps. Reference number: [id number]. 5/05/2022   11:57:15 PM CDT

## 2022-05-06 NOTE — TELEPHONE ENCOUNTER
Refill Routing Note   Medication(s) are not appropriate for processing by [facility] for the following reason(s):      - Medication not previously prescribed by PCP    [facility] action(s):  Defer          Medication reconciliation completed: No     Appointments  past 12m or future 3m with PCP    Date Provider   Last Visit   4/12/2022 Angelita Dorado, DO   Next Visit   5/5/2022 Angelita Dorado, DO   ED visits in past 90 days: 0        Note composed:11:56 PM 05/05/2022

## 2022-05-06 NOTE — TELEPHONE ENCOUNTER
No new care gaps identified.  [facility] Embedded Care Gaps. Reference number: [id number]. 5/05/2022   7:56:08 PM CDT

## 2022-05-17 LAB
LEFT EYE DM RETINOPATHY: NEGATIVE
RIGHT EYE DM RETINOPATHY: NEGATIVE

## 2022-05-23 ENCOUNTER — PATIENT OUTREACH (OUTPATIENT)
Dept: ADMINISTRATIVE | Facility: HOSPITAL | Age: 72
End: 2022-05-23
Payer: MEDICARE

## 2022-05-23 NOTE — PROGRESS NOTES
Spoke with Ms Francisco Javier daughter, regarding eye exam.   States she has not had an exam this year, but she does have appt scheduled for 5/25/22.  Remind me set to follow up.

## 2022-05-25 DIAGNOSIS — I10 HYPERTENSION GOAL BP (BLOOD PRESSURE) < 130/80: ICD-10-CM

## 2022-05-25 RX ORDER — LABETALOL 200 MG/1
200 TABLET, FILM COATED ORAL 2 TIMES DAILY
Qty: 180 TABLET | Refills: 3 | Status: SHIPPED | OUTPATIENT
Start: 2022-05-25 | End: 2023-03-17 | Stop reason: SDUPTHER

## 2022-05-25 NOTE — TELEPHONE ENCOUNTER
Refill Authorization Note   Maryjane Mcintyre  is requesting a refill authorization.  Brief Assessment and Rationale for Refill:  Approve     Medication Therapy Plan:       Medication Reconciliation Completed: No   Comments:     No Care Gaps recommended.     Note composed:11:23 AM 05/25/2022

## 2022-05-25 NOTE — TELEPHONE ENCOUNTER
No new care gaps identified.  [facility] Embedded Care Gaps. Reference number: [id number]. 5/25/2022   8:29:55 AM CDT

## 2022-05-31 NOTE — PROGRESS NOTES
Spoke with [facility], regarding eye exam, scheduled 5/25/22.  States she had a dilated exam on 5/17/22. Requested I re-send [place].  [place] faxed to [phone].

## 2022-06-27 ENCOUNTER — TELEPHONE (OUTPATIENT)
Dept: INTERNAL MEDICINE | Facility: CLINIC | Age: 72
End: 2022-06-27
Payer: MEDICARE

## 2022-06-27 NOTE — TELEPHONE ENCOUNTER
Daughter would like to know when should her mom be seen back in office. LOV 4/12/22 I didn't see any f/u in clinic notes daughter denies any acute issues going on with patient at this time. Can you please advice?

## 2022-06-27 NOTE — TELEPHONE ENCOUNTER
----- Message from Melissa Vines sent at 6/27/2022  4:11 PM CDT -----  Contact: Eb daughter [phone]  1MEDICALADVICE     Patient is calling for Medical Advice regarding:    How long has patient had these symptoms:    Pharmacy name and phone#:    Would like response via [facility]: call back    Comments: Pt's daughter is requesting a call back from the nurse to see when her mom is supposed to come in for another visit

## 2022-06-28 NOTE — TELEPHONE ENCOUNTER
Lab results review note from 4/12 states to schedule A1C, CMP, lipid panel and f/u with [facility] in 3 months. Please schedule these in mid July.

## 2022-07-11 NOTE — TELEPHONE ENCOUNTER
----- Message from Cristianolennychuck Sweetie sent at 7/11/2022  3:54 PM CDT -----  Contact: [phone]  Type:  RX Refill Request    Who Called: nekiba   Refill or New Rx:refill  RX Name and Strength:losartan   How is the patient currently taking it? (ex. 1XDay): once a day   Is this a 30 day or 90 day RX: 90  Preferred Pharmacy with phone number:   [id number] - [facility] [place] - [address] AT [facility]  [address] [place] [address]  Phone: [phone] Fax: [phone]   Local or Mail Order:local  Ordering Provider: Dr Dorado   Would the patient rather a call back or a response via [facility]? Call back   Best Call Back Number:[phone]  Additional Information: n/a      Thanks   KB

## 2022-07-13 RX ORDER — LOSARTAN POTASSIUM 100 MG/1
100 TABLET ORAL DAILY
Qty: 90 TABLET | Refills: 1 | Status: SHIPPED | OUTPATIENT
Start: 2022-07-13 | End: 2022-11-21 | Stop reason: SDUPTHER

## 2022-07-21 ENCOUNTER — TELEPHONE (OUTPATIENT)
Dept: INTERNAL MEDICINE | Facility: CLINIC | Age: 72
End: 2022-07-21
Payer: MEDICARE

## 2022-07-21 NOTE — TELEPHONE ENCOUNTER
----- Message from Devorah Graham sent at 7/21/2022  2:59 PM CDT -----  Pt is calling in regards to seeing if pt need to be seen for any reason. Pt can be reached at [phone] (uiat)

## 2022-07-26 DIAGNOSIS — E11.9 TYPE 2 DIABETES MELLITUS WITHOUT COMPLICATION: ICD-10-CM

## 2022-08-04 ENCOUNTER — OFFICE VISIT (OUTPATIENT)
Dept: INTERNAL MEDICINE | Facility: CLINIC | Age: 72
End: 2022-08-04
Payer: MEDICARE

## 2022-08-04 ENCOUNTER — LAB VISIT (OUTPATIENT)
Dept: LAB | Facility: HOSPITAL | Age: 72
End: 2022-08-04
Attending: INTERNAL MEDICINE
Payer: MEDICARE

## 2022-08-04 VITALS
WEIGHT: 149.25 LBS | BODY MASS INDEX: 25.48 KG/M2 | OXYGEN SATURATION: 99 % | DIASTOLIC BLOOD PRESSURE: 72 MMHG | RESPIRATION RATE: 17 BRPM | SYSTOLIC BLOOD PRESSURE: 130 MMHG | HEIGHT: 64 IN | HEART RATE: 75 BPM | TEMPERATURE: 97 F

## 2022-08-04 DIAGNOSIS — E11.9 TYPE 2 DIABETES MELLITUS WITHOUT COMPLICATION: ICD-10-CM

## 2022-08-04 DIAGNOSIS — E11.65 UNCONTROLLED TYPE 2 DIABETES MELLITUS WITH HYPERGLYCEMIA, WITH LONG-TERM CURRENT USE OF INSULIN: ICD-10-CM

## 2022-08-04 DIAGNOSIS — Z23 NEED FOR PNEUMOCOCCAL VACCINATION: ICD-10-CM

## 2022-08-04 DIAGNOSIS — Z79.4 UNCONTROLLED TYPE 2 DIABETES MELLITUS WITH HYPERGLYCEMIA, WITH LONG-TERM CURRENT USE OF INSULIN: ICD-10-CM

## 2022-08-04 DIAGNOSIS — E78.2 MIXED HYPERLIPIDEMIA: ICD-10-CM

## 2022-08-04 DIAGNOSIS — E78.5 HYPERLIPIDEMIA LDL GOAL <70: ICD-10-CM

## 2022-08-04 DIAGNOSIS — Z12.31 ENCOUNTER FOR SCREENING MAMMOGRAM FOR MALIGNANT NEOPLASM OF BREAST: ICD-10-CM

## 2022-08-04 DIAGNOSIS — E11.51 CONTROLLED TYPE 2 DM WITH PERIPHERAL CIRCULATORY DISORDER: ICD-10-CM

## 2022-08-04 DIAGNOSIS — I10 HYPERTENSION GOAL BP (BLOOD PRESSURE) < 130/80: Primary | ICD-10-CM

## 2022-08-04 DIAGNOSIS — I10 HYPERTENSION GOAL BP (BLOOD PRESSURE) < 130/80: ICD-10-CM

## 2022-08-04 LAB
ALBUMIN SERPL BCP-MCNC: 4.1 G/DL (ref 3.5–5.2)
ALBUMIN/CREAT UR: NORMAL UG/MG (ref 0–30)
ALP SERPL-CCNC: 40 U/L (ref 55–135)
ALT SERPL W/O P-5'-P-CCNC: 17 U/L (ref 10–44)
ANION GAP SERPL CALC-SCNC: 10 MMOL/L (ref 8–16)
AST SERPL-CCNC: 13 U/L (ref 10–40)
BILIRUB SERPL-MCNC: 0.7 MG/DL (ref 0.1–1)
BUN SERPL-MCNC: 11 MG/DL (ref 8–23)
CALCIUM SERPL-MCNC: 10.4 MG/DL (ref 8.7–10.5)
CHLORIDE SERPL-SCNC: 107 MMOL/L (ref 95–110)
CHOLEST SERPL-MCNC: 115 MG/DL (ref 120–199)
CHOLEST/HDLC SERPL: 2.7 {RATIO} (ref 2–5)
CO2 SERPL-SCNC: 24 MMOL/L (ref 23–29)
CREAT SERPL-MCNC: 0.9 MG/DL (ref 0.5–1.4)
CREAT UR-MCNC: 94 MG/DL (ref 15–325)
EST. GFR  (NO RACE VARIABLE): >60 ML/MIN/1.73 M^2
ESTIMATED AVG GLUCOSE: 192 MG/DL (ref 68–131)
GLUCOSE SERPL-MCNC: 142 MG/DL (ref 70–110)
HBA1C MFR BLD: 8.3 % (ref 4–5.6)
HDLC SERPL-MCNC: 42 MG/DL (ref 40–75)
HDLC SERPL: 36.5 % (ref 20–50)
LDLC SERPL CALC-MCNC: 57.6 MG/DL (ref 63–159)
MICROALBUMIN UR DL<=1MG/L-MCNC: <5 UG/ML
NONHDLC SERPL-MCNC: 73 MG/DL
POTASSIUM SERPL-SCNC: 3.9 MMOL/L (ref 3.5–5.1)
PROT SERPL-MCNC: 7.2 G/DL (ref 6–8.4)
SODIUM SERPL-SCNC: 141 MMOL/L (ref 136–145)
TRIGL SERPL-MCNC: 77 MG/DL (ref 30–150)

## 2022-08-04 PROCEDURE — 3075F PR MOST RECENT SYSTOLIC BLOOD PRESS GE 130-139MM HG: ICD-10-PCS | Mod: CPTII,S$GLB,, | Performed by: PHYSICIAN ASSISTANT

## 2022-08-04 PROCEDURE — 99999 PR PBB SHADOW E&M-EST. PATIENT-LVL V: CPT | Mod: PBBFAC,,, | Performed by: PHYSICIAN ASSISTANT

## 2022-08-04 PROCEDURE — G0009 ADMIN PNEUMOCOCCAL VACCINE: HCPCS | Mod: S$GLB,,, | Performed by: PHYSICIAN ASSISTANT

## 2022-08-04 PROCEDURE — 3008F PR BODY MASS INDEX (BMI) DOCUMENTED: ICD-10-PCS | Mod: CPTII,S$GLB,, | Performed by: PHYSICIAN ASSISTANT

## 2022-08-04 PROCEDURE — 3288F PR FALLS RISK ASSESSMENT DOCUMENTED: ICD-10-PCS | Mod: CPTII,S$GLB,, | Performed by: PHYSICIAN ASSISTANT

## 2022-08-04 PROCEDURE — 3052F PR MOST RECENT HEMOGLOBIN A1C LEVEL 8.0 - < 9.0%: ICD-10-PCS | Mod: CPTII,S$GLB,, | Performed by: PHYSICIAN ASSISTANT

## 2022-08-04 PROCEDURE — 3288F FALL RISK ASSESSMENT DOCD: CPT | Mod: CPTII,S$GLB,, | Performed by: PHYSICIAN ASSISTANT

## 2022-08-04 PROCEDURE — 1126F AMNT PAIN NOTED NONE PRSNT: CPT | Mod: CPTII,S$GLB,, | Performed by: PHYSICIAN ASSISTANT

## 2022-08-04 PROCEDURE — 3078F PR MOST RECENT DIASTOLIC BLOOD PRESSURE < 80 MM HG: ICD-10-PCS | Mod: CPTII,S$GLB,, | Performed by: PHYSICIAN ASSISTANT

## 2022-08-04 PROCEDURE — 4010F ACE/ARB THERAPY RXD/TAKEN: CPT | Mod: CPTII,S$GLB,, | Performed by: PHYSICIAN ASSISTANT

## 2022-08-04 PROCEDURE — 1126F PR PAIN SEVERITY QUANTIFIED, NO PAIN PRESENT: ICD-10-PCS | Mod: CPTII,S$GLB,, | Performed by: PHYSICIAN ASSISTANT

## 2022-08-04 PROCEDURE — 1160F RVW MEDS BY RX/DR IN RCRD: CPT | Mod: CPTII,S$GLB,, | Performed by: PHYSICIAN ASSISTANT

## 2022-08-04 PROCEDURE — 1159F PR MEDICATION LIST DOCUMENTED IN MEDICAL RECORD: ICD-10-PCS | Mod: CPTII,S$GLB,, | Performed by: PHYSICIAN ASSISTANT

## 2022-08-04 PROCEDURE — 36415 COLL VENOUS BLD VENIPUNCTURE: CPT | Performed by: INTERNAL MEDICINE

## 2022-08-04 PROCEDURE — 80061 LIPID PANEL: CPT | Performed by: INTERNAL MEDICINE

## 2022-08-04 PROCEDURE — 82570 ASSAY OF URINE CREATININE: CPT | Performed by: INTERNAL MEDICINE

## 2022-08-04 PROCEDURE — 99999 PR PBB SHADOW E&M-EST. PATIENT-LVL V: ICD-10-PCS | Mod: PBBFAC,,, | Performed by: PHYSICIAN ASSISTANT

## 2022-08-04 PROCEDURE — 1101F PR PT FALLS ASSESS DOC 0-1 FALLS W/OUT INJ PAST YR: ICD-10-PCS | Mod: CPTII,S$GLB,, | Performed by: PHYSICIAN ASSISTANT

## 2022-08-04 PROCEDURE — 90677 PNEUMOCOCCAL CONJUGATE VACCINE 20-VALENT: ICD-10-PCS | Mod: S$GLB,,, | Performed by: PHYSICIAN ASSISTANT

## 2022-08-04 PROCEDURE — 99214 OFFICE O/P EST MOD 30 MIN: CPT | Mod: S$GLB,,, | Performed by: PHYSICIAN ASSISTANT

## 2022-08-04 PROCEDURE — 1101F PT FALLS ASSESS-DOCD LE1/YR: CPT | Mod: CPTII,S$GLB,, | Performed by: PHYSICIAN ASSISTANT

## 2022-08-04 PROCEDURE — 3052F HG A1C>EQUAL 8.0%<EQUAL 9.0%: CPT | Mod: CPTII,S$GLB,, | Performed by: PHYSICIAN ASSISTANT

## 2022-08-04 PROCEDURE — 3078F DIAST BP <80 MM HG: CPT | Mod: CPTII,S$GLB,, | Performed by: PHYSICIAN ASSISTANT

## 2022-08-04 PROCEDURE — 82043 UR ALBUMIN QUANTITATIVE: CPT | Performed by: INTERNAL MEDICINE

## 2022-08-04 PROCEDURE — 99214 PR OFFICE/OUTPT VISIT, EST, LEVL IV, 30-39 MIN: ICD-10-PCS | Mod: S$GLB,,, | Performed by: PHYSICIAN ASSISTANT

## 2022-08-04 PROCEDURE — 3008F BODY MASS INDEX DOCD: CPT | Mod: CPTII,S$GLB,, | Performed by: PHYSICIAN ASSISTANT

## 2022-08-04 PROCEDURE — 83036 HEMOGLOBIN GLYCOSYLATED A1C: CPT | Performed by: INTERNAL MEDICINE

## 2022-08-04 PROCEDURE — G0009 PNEUMOCOCCAL CONJUGATE VACCINE 20-VALENT: ICD-10-PCS | Mod: S$GLB,,, | Performed by: PHYSICIAN ASSISTANT

## 2022-08-04 PROCEDURE — 4010F PR ACE/ARB THEARPY RXD/TAKEN: ICD-10-PCS | Mod: CPTII,S$GLB,, | Performed by: PHYSICIAN ASSISTANT

## 2022-08-04 PROCEDURE — 1159F MED LIST DOCD IN RCRD: CPT | Mod: CPTII,S$GLB,, | Performed by: PHYSICIAN ASSISTANT

## 2022-08-04 PROCEDURE — 3075F SYST BP GE 130 - 139MM HG: CPT | Mod: CPTII,S$GLB,, | Performed by: PHYSICIAN ASSISTANT

## 2022-08-04 PROCEDURE — 80053 COMPREHEN METABOLIC PANEL: CPT | Performed by: INTERNAL MEDICINE

## 2022-08-04 PROCEDURE — 90677 PCV20 VACCINE IM: CPT | Mod: S$GLB,,, | Performed by: PHYSICIAN ASSISTANT

## 2022-08-04 PROCEDURE — 1160F PR REVIEW ALL MEDS BY PRESCRIBER/CLIN PHARMACIST DOCUMENTED: ICD-10-PCS | Mod: CPTII,S$GLB,, | Performed by: PHYSICIAN ASSISTANT

## 2022-08-04 NOTE — PROGRESS NOTES
"Subjective:      Patient ID: Maryjane Mcintyre is a 72 y.o. female.    Chief Complaint: Follow-up    Patient is known to me, being seen today for follow up.   Denies recent hospitalizations, fall 3wks ago, seen in Urgent Care, feeling fine   Denies current concerns/complaints     HTN- labetalol 200mg bid, losartan 100mg, amlodipine 10mg, HCTZ 12.5mg   HLD- on statin therapy   DM- A1c 8.0%, trijardy 10-5-1000, lantus 36 units   High days of 160s, low days 120s     Due for labs     Last visit April 2022 with Dr. Dorado     Review of Systems   Constitutional: Negative for chills, diaphoresis and fever.   HENT: Negative for congestion, rhinorrhea and sore throat.    Respiratory: Negative for cough, shortness of breath and wheezing.    Cardiovascular: Negative for chest pain and leg swelling.   Gastrointestinal: Negative for abdominal pain, constipation, diarrhea, nausea and vomiting.   Skin: Negative for rash.   Neurological: Negative for dizziness, light-headedness and headaches.       Objective:   /72   Pulse 75   Temp 97.3 °F (36.3 °C)   Resp 17   Ht 5' 4" (1.626 m)   Wt 67.7 kg (149 lb 4 oz)   SpO2 99%   BMI 25.62 kg/m²   Physical Exam  Constitutional:       General: She is not in acute distress.     Appearance: Normal appearance. She is well-developed. She is not ill-appearing.   HENT:      Head: Normocephalic and atraumatic.   Cardiovascular:      Rate and Rhythm: Normal rate and regular rhythm.      Heart sounds: Normal heart sounds. No murmur heard.  Pulmonary:      Effort: Pulmonary effort is normal. No respiratory distress.      Breath sounds: Normal breath sounds. No decreased breath sounds.   Musculoskeletal:      Right lower leg: No edema.      Left lower leg: No edema.   Skin:     General: Skin is warm and dry.      Findings: No rash.   Psychiatric:         Speech: Speech normal.         Behavior: Behavior normal.         Thought Content: Thought content normal.       Assessment:      1. " Hypertension goal BP (blood pressure) < 130/80    2. Mixed hyperlipidemia    3. Controlled type 2 DM with peripheral circulatory disorder    4. Encounter for screening mammogram for malignant neoplasm of breast    5. Need for pneumococcal vaccination       Plan:   Hypertension goal BP (blood pressure) < 130/80  -     CBC Auto Differential; Future; Expected date: 08/04/2022  -     Comprehensive Metabolic Panel; Future; Expected date: 08/04/2022    Mixed hyperlipidemia  -     Lipid Panel; Future; Expected date: 08/04/2022    Controlled type 2 DM with peripheral circulatory disorder  -     Hemoglobin A1C; Future; Expected date: 08/04/2022  -     Microalbumin/Creatinine Ratio, Urine; Future; Expected date: 08/04/2022    Encounter for screening mammogram for malignant neoplasm of breast  -     Mammo Digital Screening Bilat w/ Robbie; Future; Expected date: 08/04/2022    Need for pneumococcal vaccination  -     (In Office Administered) Pneumococcal Conjugate Vaccine (20 Valent) ([facility])      Discussed needed vaccines     Pod appt to be scheduled    Fasting labs     3mth f/u or sooner if needed

## 2022-08-17 DIAGNOSIS — K21.9 CHRONIC GERD: ICD-10-CM

## 2022-08-17 RX ORDER — OMEPRAZOLE 20 MG/1
CAPSULE, DELAYED RELEASE ORAL
Qty: 90 CAPSULE | Refills: 2 | Status: SHIPPED | OUTPATIENT
Start: 2022-08-17 | End: 2023-03-17 | Stop reason: SDUPTHER

## 2022-08-17 RX ORDER — PRAVASTATIN SODIUM 80 MG/1
TABLET ORAL
Qty: 90 TABLET | Refills: 2 | Status: SHIPPED | OUTPATIENT
Start: 2022-08-17 | End: 2023-04-27 | Stop reason: SDUPTHER

## 2022-08-17 NOTE — TELEPHONE ENCOUNTER
Refill Authorization Note   Maryjane Mcintyre  is requesting a refill authorization.  Brief Assessment and Rationale for Refill:  Approve     Medication Therapy Plan:       Medication Reconciliation Completed: No   Comments:     No Care Gaps Recommended     Note composed:10:50 AM 08/17/2022

## 2022-08-17 NOTE — TELEPHONE ENCOUNTER
No new care gaps identified.  [facility] Embedded Care Gaps. Reference number: [id number]. 8/17/2022   5:31:57 AM FABIT

## 2022-08-18 ENCOUNTER — TELEPHONE (OUTPATIENT)
Dept: INTERNAL MEDICINE | Facility: CLINIC | Age: 72
End: 2022-08-18
Payer: MEDICARE

## 2022-08-18 DIAGNOSIS — E11.65 UNCONTROLLED TYPE 2 DIABETES MELLITUS WITH HYPERGLYCEMIA, WITH LONG-TERM CURRENT USE OF INSULIN: Primary | ICD-10-CM

## 2022-08-18 DIAGNOSIS — Z79.4 UNCONTROLLED TYPE 2 DIABETES MELLITUS WITH HYPERGLYCEMIA, WITH LONG-TERM CURRENT USE OF INSULIN: Primary | ICD-10-CM

## 2022-08-18 RX ORDER — INSULIN GLARGINE 100 [IU]/ML
40 INJECTION, SOLUTION SUBCUTANEOUS NIGHTLY
Qty: 30 ML | Refills: 3 | Status: SHIPPED | OUTPATIENT
Start: 2022-08-18 | End: 2023-03-17

## 2022-08-23 ENCOUNTER — TELEPHONE (OUTPATIENT)
Dept: INTERNAL MEDICINE | Facility: CLINIC | Age: 72
End: 2022-08-23
Payer: MEDICARE

## 2022-08-23 NOTE — TELEPHONE ENCOUNTER
----- Message from Sherri Uriarte sent at 8/23/2022 10:55 AM CDT -----  Please call pt eli Condon @ [phone] regarding pt glucose level and insulin, have question about pt sliding scales and recommendations.

## 2022-08-23 NOTE — TELEPHONE ENCOUNTER
I do not recommend sliding scales to be used at home. I recommend increasing Lantus to 40 units and monitoring glucoses. If readings are above 200, notify me.

## 2022-08-23 NOTE — TELEPHONE ENCOUNTER
Patient daughter would like to know if Dr. Dorado has a sliding scale that she would recommend that patient goes to get a better understanding of how much insulin to give the patient. Can you please advise? Daughter states pt has never been seen in Diabetes before.

## 2022-08-24 NOTE — TELEPHONE ENCOUNTER
Gave pt's daughter Dr. Dorado msg>>>I do not recommend sliding scales to be used at home. I recommend increasing Lantus to 40 units and monitoring glucoses. If readings are above 200, notify me.     Daughter would like to know when she should give Lantus I stated every evenings like stated on Rx. Is this correct. Daughter stated that sugar been 132 150 at times. She guess that ok just to increase Lantus.

## 2022-11-14 ENCOUNTER — PATIENT OUTREACH (OUTPATIENT)
Dept: ADMINISTRATIVE | Facility: HOSPITAL | Age: 72
End: 2022-11-14
Payer: MEDICARE

## 2022-11-21 ENCOUNTER — OFFICE VISIT (OUTPATIENT)
Dept: PODIATRY | Facility: CLINIC | Age: 72
End: 2022-11-21
Payer: MEDICARE

## 2022-11-21 ENCOUNTER — LAB VISIT (OUTPATIENT)
Dept: LAB | Facility: HOSPITAL | Age: 72
End: 2022-11-21
Attending: INTERNAL MEDICINE
Payer: MEDICARE

## 2022-11-21 ENCOUNTER — OFFICE VISIT (OUTPATIENT)
Dept: INTERNAL MEDICINE | Facility: CLINIC | Age: 72
End: 2022-11-21
Payer: MEDICARE

## 2022-11-21 VITALS
DIASTOLIC BLOOD PRESSURE: 68 MMHG | WEIGHT: 150.38 LBS | TEMPERATURE: 97 F | SYSTOLIC BLOOD PRESSURE: 130 MMHG | RESPIRATION RATE: 17 BRPM | HEIGHT: 64 IN | BODY MASS INDEX: 25.67 KG/M2 | OXYGEN SATURATION: 97 % | HEART RATE: 84 BPM

## 2022-11-21 DIAGNOSIS — E78.2 MIXED HYPERLIPIDEMIA: ICD-10-CM

## 2022-11-21 DIAGNOSIS — I10 HYPERTENSION GOAL BP (BLOOD PRESSURE) < 130/80: ICD-10-CM

## 2022-11-21 DIAGNOSIS — I10 HYPERTENSION GOAL BP (BLOOD PRESSURE) < 130/80: Primary | ICD-10-CM

## 2022-11-21 DIAGNOSIS — Z79.4 UNCONTROLLED TYPE 2 DIABETES MELLITUS WITH HYPERGLYCEMIA, WITH LONG-TERM CURRENT USE OF INSULIN: ICD-10-CM

## 2022-11-21 DIAGNOSIS — I77.1 ARTERIAL INSUFFICIENCY: ICD-10-CM

## 2022-11-21 DIAGNOSIS — E11.9 ENCOUNTER FOR COMPREHENSIVE DIABETIC FOOT EXAMINATION, TYPE 2 DIABETES MELLITUS: ICD-10-CM

## 2022-11-21 DIAGNOSIS — L60.2 NAIL DISORDER (ONYCHOGRYPHOSIS): ICD-10-CM

## 2022-11-21 DIAGNOSIS — E78.5 HYPERLIPIDEMIA LDL GOAL <70: ICD-10-CM

## 2022-11-21 DIAGNOSIS — E11.65 UNCONTROLLED TYPE 2 DIABETES MELLITUS WITH HYPERGLYCEMIA, WITH LONG-TERM CURRENT USE OF INSULIN: ICD-10-CM

## 2022-11-21 DIAGNOSIS — F32.A DEPRESSION, UNSPECIFIED DEPRESSION TYPE: ICD-10-CM

## 2022-11-21 DIAGNOSIS — E11.51 CONTROLLED TYPE 2 DM WITH PERIPHERAL CIRCULATORY DISORDER: ICD-10-CM

## 2022-11-21 DIAGNOSIS — E11.51 CONTROLLED TYPE 2 DM WITH PERIPHERAL CIRCULATORY DISORDER: Primary | ICD-10-CM

## 2022-11-21 LAB
ALBUMIN SERPL BCP-MCNC: 3.9 G/DL (ref 3.5–5.2)
ALP SERPL-CCNC: 42 U/L (ref 55–135)
ALT SERPL W/O P-5'-P-CCNC: 13 U/L (ref 10–44)
ANION GAP SERPL CALC-SCNC: 9 MMOL/L (ref 8–16)
AST SERPL-CCNC: 14 U/L (ref 10–40)
BILIRUB SERPL-MCNC: 0.5 MG/DL (ref 0.1–1)
BUN SERPL-MCNC: 18 MG/DL (ref 8–23)
CALCIUM SERPL-MCNC: 10 MG/DL (ref 8.7–10.5)
CHLORIDE SERPL-SCNC: 109 MMOL/L (ref 95–110)
CHOLEST SERPL-MCNC: 128 MG/DL (ref 120–199)
CHOLEST/HDLC SERPL: 3.2 {RATIO} (ref 2–5)
CO2 SERPL-SCNC: 23 MMOL/L (ref 23–29)
CREAT SERPL-MCNC: 0.9 MG/DL (ref 0.5–1.4)
EST. GFR  (NO RACE VARIABLE): >60 ML/MIN/1.73 M^2
ESTIMATED AVG GLUCOSE: 174 MG/DL (ref 68–131)
GLUCOSE SERPL-MCNC: 150 MG/DL (ref 70–110)
HBA1C MFR BLD: 7.7 % (ref 4–5.6)
HDLC SERPL-MCNC: 40 MG/DL (ref 40–75)
HDLC SERPL: 31.3 % (ref 20–50)
LDLC SERPL CALC-MCNC: 68.2 MG/DL (ref 63–159)
NONHDLC SERPL-MCNC: 88 MG/DL
POTASSIUM SERPL-SCNC: 3.4 MMOL/L (ref 3.5–5.1)
PROT SERPL-MCNC: 6.8 G/DL (ref 6–8.4)
SODIUM SERPL-SCNC: 141 MMOL/L (ref 136–145)
TRIGL SERPL-MCNC: 99 MG/DL (ref 30–150)

## 2022-11-21 PROCEDURE — 80061 LIPID PANEL: CPT | Performed by: INTERNAL MEDICINE

## 2022-11-21 PROCEDURE — 3052F HG A1C>EQUAL 8.0%<EQUAL 9.0%: CPT | Mod: CPTII,S$GLB,, | Performed by: PODIATRIST

## 2022-11-21 PROCEDURE — 4010F PR ACE/ARB THEARPY RXD/TAKEN: ICD-10-PCS | Mod: CPTII,S$GLB,, | Performed by: PODIATRIST

## 2022-11-21 PROCEDURE — 1101F PR PT FALLS ASSESS DOC 0-1 FALLS W/OUT INJ PAST YR: ICD-10-PCS | Mod: CPTII,S$GLB,, | Performed by: PODIATRIST

## 2022-11-21 PROCEDURE — 3288F PR FALLS RISK ASSESSMENT DOCUMENTED: ICD-10-PCS | Mod: CPTII,S$GLB,, | Performed by: PODIATRIST

## 2022-11-21 PROCEDURE — 99999 PR PBB SHADOW E&M-EST. PATIENT-LVL IV: CPT | Mod: PBBFAC,,, | Performed by: PHYSICIAN ASSISTANT

## 2022-11-21 PROCEDURE — 99214 PR OFFICE/OUTPT VISIT, EST, LEVL IV, 30-39 MIN: ICD-10-PCS | Mod: S$GLB,,, | Performed by: PHYSICIAN ASSISTANT

## 2022-11-21 PROCEDURE — 4010F ACE/ARB THERAPY RXD/TAKEN: CPT | Mod: CPTII,S$GLB,, | Performed by: PODIATRIST

## 2022-11-21 PROCEDURE — 11721 PR DEBRIDEMENT OF NAILS, 6 OR MORE: ICD-10-PCS | Mod: Q9,S$GLB,, | Performed by: PODIATRIST

## 2022-11-21 PROCEDURE — 99999 PR PBB SHADOW E&M-EST. PATIENT-LVL III: CPT | Mod: PBBFAC,,, | Performed by: PODIATRIST

## 2022-11-21 PROCEDURE — 1159F MED LIST DOCD IN RCRD: CPT | Mod: CPTII,S$GLB,, | Performed by: PODIATRIST

## 2022-11-21 PROCEDURE — 3078F DIAST BP <80 MM HG: CPT | Mod: CPTII,S$GLB,, | Performed by: PHYSICIAN ASSISTANT

## 2022-11-21 PROCEDURE — 3061F NEG MICROALBUMINURIA REV: CPT | Mod: CPTII,S$GLB,, | Performed by: PHYSICIAN ASSISTANT

## 2022-11-21 PROCEDURE — 90694 VACC AIIV4 NO PRSRV 0.5ML IM: CPT | Mod: S$GLB,,, | Performed by: PHYSICIAN ASSISTANT

## 2022-11-21 PROCEDURE — G0008 FLU VACCINE - QUADRIVALENT - ADJUVANTED: ICD-10-PCS | Mod: S$GLB,,, | Performed by: PHYSICIAN ASSISTANT

## 2022-11-21 PROCEDURE — 4010F PR ACE/ARB THEARPY RXD/TAKEN: ICD-10-PCS | Mod: CPTII,S$GLB,, | Performed by: PHYSICIAN ASSISTANT

## 2022-11-21 PROCEDURE — 3061F PR NEG MICROALBUMINURIA RESULT DOCUMENTED/REVIEW: ICD-10-PCS | Mod: CPTII,S$GLB,, | Performed by: PODIATRIST

## 2022-11-21 PROCEDURE — 1160F PR REVIEW ALL MEDS BY PRESCRIBER/CLIN PHARMACIST DOCUMENTED: ICD-10-PCS | Mod: CPTII,S$GLB,, | Performed by: PODIATRIST

## 2022-11-21 PROCEDURE — 99213 OFFICE O/P EST LOW 20 MIN: CPT | Mod: 25,S$GLB,, | Performed by: PODIATRIST

## 2022-11-21 PROCEDURE — 1159F PR MEDICATION LIST DOCUMENTED IN MEDICAL RECORD: ICD-10-PCS | Mod: CPTII,S$GLB,, | Performed by: PODIATRIST

## 2022-11-21 PROCEDURE — 1160F RVW MEDS BY RX/DR IN RCRD: CPT | Mod: CPTII,S$GLB,, | Performed by: PHYSICIAN ASSISTANT

## 2022-11-21 PROCEDURE — 3066F NEPHROPATHY DOC TX: CPT | Mod: CPTII,S$GLB,, | Performed by: PODIATRIST

## 2022-11-21 PROCEDURE — 99213 PR OFFICE/OUTPT VISIT, EST, LEVL III, 20-29 MIN: ICD-10-PCS | Mod: 25,S$GLB,, | Performed by: PODIATRIST

## 2022-11-21 PROCEDURE — 3066F PR DOCUMENTATION OF TREATMENT FOR NEPHROPATHY: ICD-10-PCS | Mod: CPTII,S$GLB,, | Performed by: PHYSICIAN ASSISTANT

## 2022-11-21 PROCEDURE — 1160F PR REVIEW ALL MEDS BY PRESCRIBER/CLIN PHARMACIST DOCUMENTED: ICD-10-PCS | Mod: CPTII,S$GLB,, | Performed by: PHYSICIAN ASSISTANT

## 2022-11-21 PROCEDURE — 1101F PT FALLS ASSESS-DOCD LE1/YR: CPT | Mod: CPTII,S$GLB,, | Performed by: PODIATRIST

## 2022-11-21 PROCEDURE — 3052F PR MOST RECENT HEMOGLOBIN A1C LEVEL 8.0 - < 9.0%: ICD-10-PCS | Mod: CPTII,S$GLB,, | Performed by: PODIATRIST

## 2022-11-21 PROCEDURE — 1160F RVW MEDS BY RX/DR IN RCRD: CPT | Mod: CPTII,S$GLB,, | Performed by: PODIATRIST

## 2022-11-21 PROCEDURE — 3075F SYST BP GE 130 - 139MM HG: CPT | Mod: CPTII,S$GLB,, | Performed by: PHYSICIAN ASSISTANT

## 2022-11-21 PROCEDURE — 90694 FLU VACCINE - QUADRIVALENT - ADJUVANTED: ICD-10-PCS | Mod: S$GLB,,, | Performed by: PHYSICIAN ASSISTANT

## 2022-11-21 PROCEDURE — 1101F PR PT FALLS ASSESS DOC 0-1 FALLS W/OUT INJ PAST YR: ICD-10-PCS | Mod: CPTII,S$GLB,, | Performed by: PHYSICIAN ASSISTANT

## 2022-11-21 PROCEDURE — 4010F ACE/ARB THERAPY RXD/TAKEN: CPT | Mod: CPTII,S$GLB,, | Performed by: PHYSICIAN ASSISTANT

## 2022-11-21 PROCEDURE — 3008F PR BODY MASS INDEX (BMI) DOCUMENTED: ICD-10-PCS | Mod: CPTII,S$GLB,, | Performed by: PHYSICIAN ASSISTANT

## 2022-11-21 PROCEDURE — 1101F PT FALLS ASSESS-DOCD LE1/YR: CPT | Mod: CPTII,S$GLB,, | Performed by: PHYSICIAN ASSISTANT

## 2022-11-21 PROCEDURE — 1126F AMNT PAIN NOTED NONE PRSNT: CPT | Mod: CPTII,S$GLB,, | Performed by: PHYSICIAN ASSISTANT

## 2022-11-21 PROCEDURE — G0008 ADMIN INFLUENZA VIRUS VAC: HCPCS | Mod: S$GLB,,, | Performed by: PHYSICIAN ASSISTANT

## 2022-11-21 PROCEDURE — 3052F PR MOST RECENT HEMOGLOBIN A1C LEVEL 8.0 - < 9.0%: ICD-10-PCS | Mod: CPTII,S$GLB,, | Performed by: PHYSICIAN ASSISTANT

## 2022-11-21 PROCEDURE — 99999 PR PBB SHADOW E&M-EST. PATIENT-LVL IV: ICD-10-PCS | Mod: PBBFAC,,, | Performed by: PHYSICIAN ASSISTANT

## 2022-11-21 PROCEDURE — 3066F PR DOCUMENTATION OF TREATMENT FOR NEPHROPATHY: ICD-10-PCS | Mod: CPTII,S$GLB,, | Performed by: PODIATRIST

## 2022-11-21 PROCEDURE — 1126F PR PAIN SEVERITY QUANTIFIED, NO PAIN PRESENT: ICD-10-PCS | Mod: CPTII,S$GLB,, | Performed by: PHYSICIAN ASSISTANT

## 2022-11-21 PROCEDURE — 99214 OFFICE O/P EST MOD 30 MIN: CPT | Mod: S$GLB,,, | Performed by: PHYSICIAN ASSISTANT

## 2022-11-21 PROCEDURE — 83036 HEMOGLOBIN GLYCOSYLATED A1C: CPT | Performed by: INTERNAL MEDICINE

## 2022-11-21 PROCEDURE — 99999 PR PBB SHADOW E&M-EST. PATIENT-LVL III: ICD-10-PCS | Mod: PBBFAC,,, | Performed by: PODIATRIST

## 2022-11-21 PROCEDURE — 1159F PR MEDICATION LIST DOCUMENTED IN MEDICAL RECORD: ICD-10-PCS | Mod: CPTII,S$GLB,, | Performed by: PHYSICIAN ASSISTANT

## 2022-11-21 PROCEDURE — 80053 COMPREHEN METABOLIC PANEL: CPT | Performed by: INTERNAL MEDICINE

## 2022-11-21 PROCEDURE — 11721 DEBRIDE NAIL 6 OR MORE: CPT | Mod: Q9,S$GLB,, | Performed by: PODIATRIST

## 2022-11-21 PROCEDURE — 3061F PR NEG MICROALBUMINURIA RESULT DOCUMENTED/REVIEW: ICD-10-PCS | Mod: CPTII,S$GLB,, | Performed by: PHYSICIAN ASSISTANT

## 2022-11-21 PROCEDURE — 3008F BODY MASS INDEX DOCD: CPT | Mod: CPTII,S$GLB,, | Performed by: PHYSICIAN ASSISTANT

## 2022-11-21 PROCEDURE — 3288F FALL RISK ASSESSMENT DOCD: CPT | Mod: CPTII,S$GLB,, | Performed by: PODIATRIST

## 2022-11-21 PROCEDURE — 3061F NEG MICROALBUMINURIA REV: CPT | Mod: CPTII,S$GLB,, | Performed by: PODIATRIST

## 2022-11-21 PROCEDURE — 3288F FALL RISK ASSESSMENT DOCD: CPT | Mod: CPTII,S$GLB,, | Performed by: PHYSICIAN ASSISTANT

## 2022-11-21 PROCEDURE — 3078F PR MOST RECENT DIASTOLIC BLOOD PRESSURE < 80 MM HG: ICD-10-PCS | Mod: CPTII,S$GLB,, | Performed by: PHYSICIAN ASSISTANT

## 2022-11-21 PROCEDURE — 3052F HG A1C>EQUAL 8.0%<EQUAL 9.0%: CPT | Mod: CPTII,S$GLB,, | Performed by: PHYSICIAN ASSISTANT

## 2022-11-21 PROCEDURE — 3075F PR MOST RECENT SYSTOLIC BLOOD PRESS GE 130-139MM HG: ICD-10-PCS | Mod: CPTII,S$GLB,, | Performed by: PHYSICIAN ASSISTANT

## 2022-11-21 PROCEDURE — 3288F PR FALLS RISK ASSESSMENT DOCUMENTED: ICD-10-PCS | Mod: CPTII,S$GLB,, | Performed by: PHYSICIAN ASSISTANT

## 2022-11-21 PROCEDURE — 3066F NEPHROPATHY DOC TX: CPT | Mod: CPTII,S$GLB,, | Performed by: PHYSICIAN ASSISTANT

## 2022-11-21 PROCEDURE — 36415 COLL VENOUS BLD VENIPUNCTURE: CPT | Performed by: INTERNAL MEDICINE

## 2022-11-21 PROCEDURE — 1159F MED LIST DOCD IN RCRD: CPT | Mod: CPTII,S$GLB,, | Performed by: PHYSICIAN ASSISTANT

## 2022-11-21 RX ORDER — LOSARTAN POTASSIUM 100 MG/1
100 TABLET ORAL DAILY
Qty: 90 TABLET | Refills: 1 | Status: SHIPPED | OUTPATIENT
Start: 2022-11-21 | End: 2023-05-18 | Stop reason: SDUPTHER

## 2022-11-21 RX ORDER — SERTRALINE HYDROCHLORIDE 25 MG/1
25 TABLET, FILM COATED ORAL DAILY
Qty: 30 TABLET | Refills: 1 | Status: SHIPPED | OUTPATIENT
Start: 2022-11-21 | End: 2023-03-17

## 2022-11-21 RX ORDER — HYDROCHLOROTHIAZIDE 12.5 MG/1
12.5 TABLET ORAL DAILY
Qty: 90 TABLET | Refills: 1 | Status: SHIPPED | OUTPATIENT
Start: 2022-11-21 | End: 2023-05-18 | Stop reason: SDUPTHER

## 2022-11-21 RX ORDER — AMLODIPINE BESYLATE 10 MG/1
10 TABLET ORAL DAILY
Qty: 90 TABLET | Refills: 1 | Status: SHIPPED | OUTPATIENT
Start: 2022-11-21 | End: 2023-05-18 | Stop reason: SDUPTHER

## 2022-11-21 NOTE — PROGRESS NOTES
"Subjective:      Patient ID: Maryjane Mcintyre is a 72 y.o. female.    Chief Complaint: Follow-up    Patient is known to me, being seen today for 3mth f/u.     HTN- labetalol 200mg bid, losartan 100mg, amlodipine 10mg, HCTZ 12.5mg   HLD- on statin therapy   DM- A1c 8.0%, trijardy 10-5-1000, lantus 40 units   Sugar 140 when last checked      Labs completed today, pending     Reports feeling "down", was on medication years ago but unsure of what     Present with daughter, lives with oldest daughter     Last visit August 2022.     Review of Systems   Constitutional:  Negative for chills, diaphoresis, fatigue and fever.   HENT:  Negative for congestion, rhinorrhea and sore throat.    Respiratory:  Negative for cough, shortness of breath and wheezing.    Gastrointestinal:  Negative for abdominal pain, constipation, diarrhea, nausea and vomiting.   Skin:  Negative for rash.   Neurological:  Negative for dizziness, light-headedness and headaches.   Psychiatric/Behavioral:  Positive for dysphoric mood (a few months).      Objective:   /68   Pulse 84   Temp 97.1 °F (36.2 °C)   Resp 17   Ht 5' 4" (1.626 m)   Wt 68.2 kg (150 lb 5.7 oz)   SpO2 97%   BMI 25.81 kg/m²   Physical Exam  Constitutional:       General: She is not in acute distress.     Appearance: Normal appearance. She is well-developed. She is not ill-appearing or diaphoretic.   HENT:      Head: Normocephalic and atraumatic.      Right Ear: External ear normal.      Left Ear: External ear normal.   Eyes:      General: Lids are normal.         Right eye: No discharge.         Left eye: No discharge.      Conjunctiva/sclera: Conjunctivae normal.      Right eye: Right conjunctiva is not injected.      Left eye: Left conjunctiva is not injected.   Cardiovascular:      Rate and Rhythm: Normal rate and regular rhythm.      Heart sounds: Normal heart sounds. No murmur heard.  Pulmonary:      Effort: Pulmonary effort is normal. No respiratory distress.      Breath " sounds: Normal breath sounds. No decreased breath sounds.   Musculoskeletal:      Right lower leg: No edema.      Left lower leg: No edema.   Skin:     General: Skin is warm and dry.      Findings: No rash.   Neurological:      Mental Status: She is alert and oriented to person, place, and time.   Psychiatric:         Speech: Speech normal.         Behavior: Behavior normal.         Thought Content: Thought content normal.         Judgment: Judgment normal.      Comments: PHQ 16      Assessment:      1. Hypertension goal BP (blood pressure) < 130/80    2. Controlled type 2 DM with peripheral circulatory disorder    3. Mixed hyperlipidemia    4. Depression, unspecified depression type       Plan:   Hypertension goal BP (blood pressure) < 130/80  -     losartan (COZAAR) 100 MG tablet; Take 1 tablet (100 mg total) by mouth once daily.  Dispense: 90 tablet; Refill: 1  -     amLODIPine (NORVASC) 10 MG tablet; Take 1 tablet (10 mg total) by mouth once daily.  Dispense: 90 tablet; Refill: 1  -     hydroCHLOROthiazide (HYDRODIURIL) 12.5 MG Tab; Take 1 tablet (12.5 mg total) by mouth once daily.  Dispense: 90 tablet; Refill: 1    Controlled type 2 DM with peripheral circulatory disorder    Mixed hyperlipidemia    Depression, unspecified depression type  -     sertraline (ZOLOFT) 25 MG tablet; Take 1 tablet (25 mg total) by mouth once daily.  Dispense: 30 tablet; Refill: 1    Additional recs pending lab results     Discussed needed vaccines     Has podiatry appt today     Discussed management of anxiety/depression are multi-factoral and aside from medicine, healthy lifestyle choices, therapy and hobbies are going to benefit her overall mental health  Discussed SSRIs  6wk f/u     3mth f/u PCP    Discussed worsening signs/symptoms and when to return to clinic or go to ED.   Patient expresses understanding and agrees with treatment plan.

## 2022-11-21 NOTE — PROGRESS NOTES
Subjective:       Patient ID: Maryjane Mcintyre is a 72 y.o. female.    Chief Complaint: Routine Foot Care (Patient is a diabetic and was last seen on 11.21.22 by Mag BYRD. She denies pain at present and is wearing socks and closed toe shoes. )      HPI: Patient presents to the office today with the chief complaint of elongated, thickened and dystrophic nail plates to the B/L foot.  She also presents for her yearly diabetic foot exam and evaluation.  This patient is a Diabetic Type II. Patient does follow with Primary Care and/or Endocrinology for management of Diabetes Mellitus. This patient's PMD is Angelita Dorado DO. This patient last saw his/her primary care provider on 11/21/2022.  Relates 0/10 pain to the right and left foot.     Hemoglobin A1C   Date Value Ref Range Status   08/04/2022 8.3 (H) 4.0 - 5.6 % Final     Comment:     ADA Screening Guidelines:  5.7-6.4%  Consistent with prediabetes  >or=6.5%  Consistent with diabetes    High levels of fetal hemoglobin interfere with the HbA1C  assay. Heterozygous hemoglobin variants (HbS, HgC, etc)do  not significantly interfere with this assay.   However, presence of multiple variants may affect accuracy.     04/12/2022 8.0 (H) 4.0 - 5.6 % Final     Comment:     ADA Screening Guidelines:  5.7-6.4%  Consistent with prediabetes  >or=6.5%  Consistent with diabetes    High levels of fetal hemoglobin interfere with the HbA1C  assay. Heterozygous hemoglobin variants (HbS, HgC, etc)do  not significantly interfere with this assay.   However, presence of multiple variants may affect accuracy.     11/15/2021 8.2 % Final   07/13/2021 9.6 % Final   03/11/2021 8.7 % Final   .     Review of patient's allergies indicates:  No Known Allergies    Past Medical History:   Diagnosis Date    Arthritis     Diabetes mellitus, type 2     GERD (gastroesophageal reflux disease)     Gout     Hyperlipidemia     Hypertension     Onychomycosis        Family History   Problem Relation  Age of Onset    Diabetes Father     Hypertension Father     Heart attack Sister     Colon cancer Sister     Stroke Sister     Colon cancer Brother        Social History     Socioeconomic History    Marital status:    Tobacco Use    Smoking status: Former     Packs/day: 1.00     Types: Cigarettes    Smokeless tobacco: Never    Tobacco comments:     Quit 2019 mid year   Substance and Sexual Activity    Alcohol use: No    Drug use: Never    Sexual activity: Not Currently       Past Surgical History:   Procedure Laterality Date    Cardiac Echo      COLONOSCOPY N/A 10/19/2020    Procedure: COLONOSCOPY;  Surgeon: Yousuf Fuller MD;  Location: [facility];  Service: Endoscopy;  Laterality: N/A;    ESOPHAGOGASTRODUODENOSCOPY N/A 10/19/2020    Procedure: ESOPHAGOGASTRODUODENOSCOPY (EGD);  Surgeon: Yousuf Fuller MD;  Location: [facility];  Service: Endoscopy;  Laterality: N/A;    GALLBLADDER SURGERY      HYSTERECTOMY         Review of Systems       Objective:   There were no vitals taken for this visit.    Physical Exam  LOWER EXTREMITY PHYSICAL EXAMINATION    VASCULAR:  The right dorsalis pedis pulse 2/4 and the right posterior tibial pulse 1/4.  The left dorsalis pedis pulse 2/4 and posterior tibial pulse on the left is 1/4.  Capillary refill is intact.  Pedal hair growth decreased.     NEUROLOGY: Protective sensation is not intact to the left and right plantar surfaces of the foot and digits, as the patient has no sensation/detection at greater than 4 distinct points of contact with 5.07 [place] monofilament. Sensation to light touch is intact on the left and right foot. Proprioception is intact, bilateral. Sensation to pin prick is reduced to absent. Vibratory sensation is diminished.    DERMATOLOGY:  Skin is supple, moist, intact.  There is no signs of callusing, ulcerations, other lesions identified to the dorsal or plantar aspect of the right or left foot.  The R1, 2, 3, 5 and left L1,2, 3, 5  are thickened, discolored dystrophic.  There is subungual debris.  Nail plates have area of dark discoloration.  The remaining nails on the right foot and the left foot are elongated but of normal color, thickness, and texture.   There is no signs of ingrowing into the medial or lateral borders.  There is no evidence of wounds or skin breakdown.  No edema or erythema.  No obvious lacerations or fissuring.  Interdigital spaces are clean, dry, intact.  No rashes or scars appreciated.    ORTHOPEDIC: Manual Muscle Testing is 5/5 in all planes on the left and right, without pains, with and without resistance. Gait pattern is non-antalgic.    Assessment:     1. Controlled type 2 DM with peripheral circulatory disorder    2. Encounter for comprehensive diabetic foot examination, type 2 diabetes mellitus    3. Arterial insufficiency    4. Nail disorder (onychogryphosis)        Plan:     Controlled type 2 DM with peripheral circulatory disorder    Encounter for comprehensive diabetic foot examination, type 2 diabetes mellitus    Arterial insufficiency    Nail disorder (onychogryphosis)      Thorough discussion is had with the patient this afternoon, concerning the diagnosis, its etiology, and the treatment algorithm at present.  Greater than 50% of this visit spent on counseling and coordination of care. Greater than 15 minutes of a 20 minute appointment spent on education about the diabetic foot, neuropathy, and prevention of limb loss.  Shoe inspection. Diabetic Foot Education. Patient reminded of the importance of good nutrition and blood sugar control to help prevent podiatric complications of diabetes. Patient instructed on proper foot hygeine. We discussed wearing proper and supportive shoe gear, daily foot inspections, never walking barefooted or sock footed, never putting sharp instruments to feet which can cause major complications associated with infection, ulcers, lacerations.      Dystrophic nail plates, as  outlined above (R#1,2,3, 5  ; L#1,2,3, 5 ), are sharply debrided with double action nail nipper, and/or with the assistance of a mechanical rotary sherron, with removal of all offending nail and nail border(s), for reduction of pains. Nails are reduced in terms of length, width and girth with removal of subungual debris to facilitate pain free weight bearing and ambulation. The elongated nails as outlined in the objective portion of this note, were trimmed to appropriate length, with a double action nail nipper, for alleviation/reduction of pains as well. Follow up in approx. 3-4 months.        Future Appointments   Date Time Provider Department Center   [facility]2023 10:[facility]   2/21/2023  8:40 AM Mag Fraire PA-C [facility]   3/20/2023  9:30 AM Jazmyn Mckeon DPM ON [facility]

## 2022-11-25 ENCOUNTER — TELEPHONE (OUTPATIENT)
Dept: INTERNAL MEDICINE | Facility: CLINIC | Age: 72
End: 2022-11-25
Payer: MEDICARE

## 2022-12-14 DIAGNOSIS — G89.29 CHRONIC PAIN OF BOTH KNEES: ICD-10-CM

## 2022-12-14 DIAGNOSIS — M25.562 CHRONIC PAIN OF BOTH KNEES: ICD-10-CM

## 2022-12-14 DIAGNOSIS — M25.561 CHRONIC PAIN OF BOTH KNEES: ICD-10-CM

## 2022-12-15 RX ORDER — CELECOXIB 200 MG/1
200 CAPSULE ORAL DAILY
Qty: 90 CAPSULE | Refills: 0 | Status: SHIPPED | OUTPATIENT
Start: 2022-12-15 | End: 2023-03-17 | Stop reason: SDUPTHER

## 2023-01-06 LAB
LEFT EYE DM RETINOPATHY: NEGATIVE
RIGHT EYE DM RETINOPATHY: NEGATIVE

## 2023-01-17 NOTE — TELEPHONE ENCOUNTER
Care Due:                  Date            Visit Type   Department     Provider  --------------------------------------------------------------------------------                                NEW PATIENT                              - OPEN       [facility] INTERNAL  Last Visit: 04-      [facility]   MEDICINE       Angelita Dorado  Next Visit: None Scheduled  None         None Found                                                            Last  Test          Frequency    Reason                     Performed    Due Date  --------------------------------------------------------------------------------    Office Visit  12 months..  insulin, labetaloL,        04- 04-                             omeprazole, pravastatin..    [facility] Embedded Care Gaps. Reference number: [id number]. 1/17/2023   2:51:29 PM CST

## 2023-01-18 RX ORDER — IPRATROPIUM BROMIDE AND ALBUTEROL 20; 100 UG/1; UG/1
2 SPRAY, METERED RESPIRATORY (INHALATION) EVERY 6 HOURS PRN
Qty: 12 G | Refills: 0 | Status: SHIPPED | OUTPATIENT
Start: 2023-01-18 | End: 2023-01-19

## 2023-01-19 ENCOUNTER — TELEPHONE (OUTPATIENT)
Dept: INTERNAL MEDICINE | Facility: CLINIC | Age: 73
End: 2023-01-19
Payer: MEDICARE

## 2023-01-19 RX ORDER — IPRATROPIUM BROMIDE AND ALBUTEROL 20; 100 UG/1; UG/1
1 SPRAY, METERED RESPIRATORY (INHALATION) EVERY 6 HOURS PRN
Qty: 12 G | Refills: 0 | Status: SHIPPED | OUTPATIENT
Start: 2023-01-19 | End: 2023-03-17 | Stop reason: SDUPTHER

## 2023-01-19 NOTE — TELEPHONE ENCOUNTER
Please verify the directions. This medication should be prescribed 2 puffs BID. The max daily dosage is 4 puffs.

## 2023-02-10 ENCOUNTER — TELEPHONE (OUTPATIENT)
Dept: INTERNAL MEDICINE | Facility: CLINIC | Age: 73
End: 2023-02-10
Payer: MEDICARE

## 2023-02-10 NOTE — TELEPHONE ENCOUNTER
----- Message from Gomez Garcia sent at 2/10/2023  8:40 AM CST -----  Pt daughter Shawn Joyce is calling in regards to her mom    Her mom has been in the hospital since this tuesday     She is calling to consult with a nurse in regards to mothers current state      Pls call her back at [phone]    Thank you    Katy

## 2023-02-28 ENCOUNTER — TELEPHONE (OUTPATIENT)
Dept: INTERNAL MEDICINE | Facility: CLINIC | Age: 73
End: 2023-02-28
Payer: MEDICARE

## 2023-02-28 NOTE — TELEPHONE ENCOUNTER
Patient's daughter would like to see about placing order for Home health. Patient is being discharge from the nursing home on March 6 and patient would need some assisting once she's home. Can you please advise if you can place order or will patient need to come in office.

## 2023-02-28 NOTE — TELEPHONE ENCOUNTER
Have them check with the nursing home providers about a home health order. That is typically ordered by them at discharge. Otherwise, she would need a visit with me and I would need the records.

## 2023-02-28 NOTE — TELEPHONE ENCOUNTER
----- Message from Kimmy Rodriguez sent at 2/28/2023  9:01 AM CST -----  Pt's daughter Coco would like a call back regarding getting an order for home health. She stated her mother is being discharged from a nursing home and will need help. Call Coco back at [phone]. Thx. EL

## 2023-03-08 PROCEDURE — G0180 MD CERTIFICATION HHA PATIENT: HCPCS | Mod: ,,, | Performed by: INTERNAL MEDICINE

## 2023-03-08 PROCEDURE — G0180 PR HOME HEALTH MD CERTIFICATION: ICD-10-PCS | Mod: ,,, | Performed by: INTERNAL MEDICINE

## 2023-03-17 ENCOUNTER — OFFICE VISIT (OUTPATIENT)
Dept: INTERNAL MEDICINE | Facility: CLINIC | Age: 73
End: 2023-03-17
Payer: MEDICARE

## 2023-03-17 VITALS
DIASTOLIC BLOOD PRESSURE: 78 MMHG | SYSTOLIC BLOOD PRESSURE: 132 MMHG | OXYGEN SATURATION: 97 % | BODY MASS INDEX: 33.72 KG/M2 | RESPIRATION RATE: 16 BRPM | TEMPERATURE: 98 F | WEIGHT: 196.44 LBS | HEART RATE: 82 BPM

## 2023-03-17 DIAGNOSIS — M85.89 OSTEOPENIA OF MULTIPLE SITES: ICD-10-CM

## 2023-03-17 DIAGNOSIS — M25.561 CHRONIC PAIN OF BOTH KNEES: ICD-10-CM

## 2023-03-17 DIAGNOSIS — E11.59 TYPE 2 DIABETES MELLITUS WITH OTHER CIRCULATORY COMPLICATION, WITH LONG-TERM CURRENT USE OF INSULIN: ICD-10-CM

## 2023-03-17 DIAGNOSIS — I15.2 HYPERTENSION ASSOCIATED WITH DIABETES: Primary | ICD-10-CM

## 2023-03-17 DIAGNOSIS — E11.59 HYPERTENSION ASSOCIATED WITH DIABETES: Primary | ICD-10-CM

## 2023-03-17 DIAGNOSIS — G89.29 CHRONIC PAIN OF BOTH KNEES: ICD-10-CM

## 2023-03-17 DIAGNOSIS — G95.9 CERVICAL MYELOPATHY: ICD-10-CM

## 2023-03-17 DIAGNOSIS — Z79.4 TYPE 2 DIABETES MELLITUS WITH OTHER CIRCULATORY COMPLICATION, WITH LONG-TERM CURRENT USE OF INSULIN: ICD-10-CM

## 2023-03-17 DIAGNOSIS — I73.9 PAD (PERIPHERAL ARTERY DISEASE): ICD-10-CM

## 2023-03-17 DIAGNOSIS — E11.69 HYPERLIPIDEMIA ASSOCIATED WITH TYPE 2 DIABETES MELLITUS: ICD-10-CM

## 2023-03-17 DIAGNOSIS — N32.81 OAB (OVERACTIVE BLADDER): ICD-10-CM

## 2023-03-17 DIAGNOSIS — K21.9 CHRONIC GERD: ICD-10-CM

## 2023-03-17 DIAGNOSIS — R05.9 COUGH, UNSPECIFIED TYPE: ICD-10-CM

## 2023-03-17 DIAGNOSIS — M25.562 CHRONIC PAIN OF BOTH KNEES: ICD-10-CM

## 2023-03-17 DIAGNOSIS — E78.5 HYPERLIPIDEMIA ASSOCIATED WITH TYPE 2 DIABETES MELLITUS: ICD-10-CM

## 2023-03-17 PROBLEM — M54.2 NECK PAIN: Status: ACTIVE | Noted: 2023-02-08

## 2023-03-17 PROBLEM — M19.90 ARTHRITIS: Status: RESOLVED | Noted: 2019-12-03 | Resolved: 2023-03-17

## 2023-03-17 PROBLEM — Z86.73 CHRONIC CEREBROVASCULAR ACCIDENT (CVA): Status: ACTIVE | Noted: 2023-02-10

## 2023-03-17 PROBLEM — I65.23 ASYMPTOMATIC BILATERAL CAROTID ARTERY STENOSIS: Status: ACTIVE | Noted: 2023-02-10

## 2023-03-17 PROCEDURE — 99214 OFFICE O/P EST MOD 30 MIN: CPT | Mod: S$GLB,,, | Performed by: PHYSICIAN ASSISTANT

## 2023-03-17 PROCEDURE — 99999 PR PBB SHADOW E&M-EST. PATIENT-LVL V: CPT | Mod: PBBFAC,,, | Performed by: PHYSICIAN ASSISTANT

## 2023-03-17 PROCEDURE — 4010F PR ACE/ARB THEARPY RXD/TAKEN: ICD-10-PCS | Mod: CPTII,S$GLB,, | Performed by: PHYSICIAN ASSISTANT

## 2023-03-17 PROCEDURE — 1160F RVW MEDS BY RX/DR IN RCRD: CPT | Mod: CPTII,S$GLB,, | Performed by: PHYSICIAN ASSISTANT

## 2023-03-17 PROCEDURE — 1159F PR MEDICATION LIST DOCUMENTED IN MEDICAL RECORD: ICD-10-PCS | Mod: CPTII,S$GLB,, | Performed by: PHYSICIAN ASSISTANT

## 2023-03-17 PROCEDURE — 1101F PT FALLS ASSESS-DOCD LE1/YR: CPT | Mod: CPTII,S$GLB,, | Performed by: PHYSICIAN ASSISTANT

## 2023-03-17 PROCEDURE — 3008F PR BODY MASS INDEX (BMI) DOCUMENTED: ICD-10-PCS | Mod: CPTII,S$GLB,, | Performed by: PHYSICIAN ASSISTANT

## 2023-03-17 PROCEDURE — 3078F PR MOST RECENT DIASTOLIC BLOOD PRESSURE < 80 MM HG: ICD-10-PCS | Mod: CPTII,S$GLB,, | Performed by: PHYSICIAN ASSISTANT

## 2023-03-17 PROCEDURE — 3075F PR MOST RECENT SYSTOLIC BLOOD PRESS GE 130-139MM HG: ICD-10-PCS | Mod: CPTII,S$GLB,, | Performed by: PHYSICIAN ASSISTANT

## 2023-03-17 PROCEDURE — 3008F BODY MASS INDEX DOCD: CPT | Mod: CPTII,S$GLB,, | Performed by: PHYSICIAN ASSISTANT

## 2023-03-17 PROCEDURE — 3288F FALL RISK ASSESSMENT DOCD: CPT | Mod: CPTII,S$GLB,, | Performed by: PHYSICIAN ASSISTANT

## 2023-03-17 PROCEDURE — 4010F ACE/ARB THERAPY RXD/TAKEN: CPT | Mod: CPTII,S$GLB,, | Performed by: PHYSICIAN ASSISTANT

## 2023-03-17 PROCEDURE — 3078F DIAST BP <80 MM HG: CPT | Mod: CPTII,S$GLB,, | Performed by: PHYSICIAN ASSISTANT

## 2023-03-17 PROCEDURE — 99999 PR PBB SHADOW E&M-EST. PATIENT-LVL V: ICD-10-PCS | Mod: PBBFAC,,, | Performed by: PHYSICIAN ASSISTANT

## 2023-03-17 PROCEDURE — 1159F MED LIST DOCD IN RCRD: CPT | Mod: CPTII,S$GLB,, | Performed by: PHYSICIAN ASSISTANT

## 2023-03-17 PROCEDURE — 99214 PR OFFICE/OUTPT VISIT, EST, LEVL IV, 30-39 MIN: ICD-10-PCS | Mod: S$GLB,,, | Performed by: PHYSICIAN ASSISTANT

## 2023-03-17 PROCEDURE — 1125F AMNT PAIN NOTED PAIN PRSNT: CPT | Mod: CPTII,S$GLB,, | Performed by: PHYSICIAN ASSISTANT

## 2023-03-17 PROCEDURE — 1160F PR REVIEW ALL MEDS BY PRESCRIBER/CLIN PHARMACIST DOCUMENTED: ICD-10-PCS | Mod: CPTII,S$GLB,, | Performed by: PHYSICIAN ASSISTANT

## 2023-03-17 PROCEDURE — 1125F PR PAIN SEVERITY QUANTIFIED, PAIN PRESENT: ICD-10-PCS | Mod: CPTII,S$GLB,, | Performed by: PHYSICIAN ASSISTANT

## 2023-03-17 PROCEDURE — 3075F SYST BP GE 130 - 139MM HG: CPT | Mod: CPTII,S$GLB,, | Performed by: PHYSICIAN ASSISTANT

## 2023-03-17 PROCEDURE — 1101F PR PT FALLS ASSESS DOC 0-1 FALLS W/OUT INJ PAST YR: ICD-10-PCS | Mod: CPTII,S$GLB,, | Performed by: PHYSICIAN ASSISTANT

## 2023-03-17 PROCEDURE — 3288F PR FALLS RISK ASSESSMENT DOCUMENTED: ICD-10-PCS | Mod: CPTII,S$GLB,, | Performed by: PHYSICIAN ASSISTANT

## 2023-03-17 RX ORDER — LABETALOL 300 MG/1
300 TABLET, FILM COATED ORAL EVERY 12 HOURS
Qty: 180 TABLET | Refills: 3 | Status: SHIPPED | OUTPATIENT
Start: 2023-03-17 | End: 2023-05-18 | Stop reason: SDUPTHER

## 2023-03-17 RX ORDER — OXYBUTYNIN CHLORIDE 5 MG/1
5 TABLET, EXTENDED RELEASE ORAL DAILY
Qty: 90 TABLET | Refills: 3 | Status: SHIPPED | OUTPATIENT
Start: 2023-03-17 | End: 2024-03-14

## 2023-03-17 RX ORDER — CELECOXIB 200 MG/1
200 CAPSULE ORAL DAILY
Qty: 90 CAPSULE | Refills: 3 | Status: SHIPPED | OUTPATIENT
Start: 2023-03-17

## 2023-03-17 RX ORDER — HUMAN INSULIN 100 [IU]/ML
INJECTION, SOLUTION SUBCUTANEOUS
COMMUNITY
Start: 2023-03-04 | End: 2023-09-21

## 2023-03-17 RX ORDER — CILOSTAZOL 50 MG/1
50 TABLET ORAL
COMMUNITY
End: 2023-03-17 | Stop reason: SDUPTHER

## 2023-03-17 RX ORDER — METHOCARBAMOL 500 MG/1
500 TABLET, FILM COATED ORAL 3 TIMES DAILY
COMMUNITY
Start: 2023-02-15 | End: 2023-03-17 | Stop reason: ALTCHOICE

## 2023-03-17 RX ORDER — OMEPRAZOLE 20 MG/1
20 CAPSULE, DELAYED RELEASE ORAL DAILY
Qty: 90 CAPSULE | Refills: 3 | Status: SHIPPED | OUTPATIENT
Start: 2023-03-17 | End: 2024-03-16

## 2023-03-17 RX ORDER — HYDROCODONE BITARTRATE AND ACETAMINOPHEN 7.5; 325 MG/1; MG/1
1 TABLET ORAL 4 TIMES DAILY PRN
COMMUNITY
Start: 2023-02-15 | End: 2023-03-17 | Stop reason: ALTCHOICE

## 2023-03-17 RX ORDER — INSULIN GLARGINE 100 [IU]/ML
8 INJECTION, SOLUTION SUBCUTANEOUS NIGHTLY
Qty: 9 ML | Refills: 3 | Status: SHIPPED | OUTPATIENT
Start: 2023-03-17 | End: 2024-03-16

## 2023-03-17 RX ORDER — METFORMIN HYDROCHLORIDE 1000 MG/1
1000 TABLET ORAL 2 TIMES DAILY
COMMUNITY
Start: 2023-03-05 | End: 2023-04-11 | Stop reason: SDUPTHER

## 2023-03-17 RX ORDER — IPRATROPIUM BROMIDE AND ALBUTEROL 20; 100 UG/1; UG/1
1 SPRAY, METERED RESPIRATORY (INHALATION) EVERY 6 HOURS PRN
Qty: 12 G | Refills: 5 | Status: SHIPPED | OUTPATIENT
Start: 2023-03-17

## 2023-03-17 NOTE — PROGRESS NOTES
Subjective:       Patient ID: Maryjane Mcintyre is a 72 y.o. female.    Chief Complaint: Follow-up (3m)      Patient presents to clinic today for followup of chronic conditions.      Review of Systems   Constitutional:  Negative for chills, fatigue, fever and unexpected weight change.   Eyes:  Negative for visual disturbance.   Respiratory:  Negative for shortness of breath.    Cardiovascular:  Negative for chest pain.   Musculoskeletal:  Negative for myalgias.   Neurological:  Negative for headaches.     Objective:      Physical Exam  Vitals and nursing note reviewed.   Constitutional:       General: She is not in acute distress.     Appearance: She is well-developed.      Comments: Patient seated in wheelchair   HENT:      Head: Normocephalic and atraumatic.   Eyes:      General: Lids are normal. No scleral icterus.     Extraocular Movements: Extraocular movements intact.      Conjunctiva/sclera: Conjunctivae normal.   Neck:      Comments: C-collar in place  Cardiovascular:      Rate and Rhythm: Normal rate and regular rhythm.   Pulmonary:      Effort: Pulmonary effort is normal.      Breath sounds: Normal breath sounds. No decreased breath sounds, wheezing, rhonchi or rales.   Neurological:      Mental Status: She is alert.      Cranial Nerves: No cranial nerve deficit.   Psychiatric:         Mood and Affect: Mood and affect normal.       Assessment:       1. Hypertension associated with diabetes    2. Hyperlipidemia associated with type 2 diabetes mellitus    3. Type 2 diabetes mellitus with other circulatory complication, with long-term current use of insulin    4. Cervical myelopathy    5. PAD (peripheral artery disease)    6. OAB (overactive bladder)    7. Chronic GERD    8. Osteopenia of multiple sites    9. Chronic pain of both knees    10. Cough, unspecified type          Plan:   1. Hypertension associated with diabetes  Assessment & Plan:  /78, controlled, continue amlodipine, hydrochlorothiazide and  losartan    Orders:  -     labetaloL (NORMODYNE) 300 MG tablet; Take 1 tablet (300 mg total) by mouth every 12 (twelve) hours.  Dispense: 180 tablet; Refill: 3    2. Hyperlipidemia associated with type 2 diabetes mellitus  Assessment & Plan:  Continue pravastatin  Lab Results   Component Value Date    CHOL 128 11/21/2022    LDLCALC 68.2 11/21/2022    TRIG 99 11/21/2022    HDL 40 11/21/2022    ALT 13 11/21/2022    AST 14 11/21/2022    ALKPHOS 42 (L) 11/21/2022          3. Type 2 diabetes mellitus with other circulatory complication, with long-term current use of insulin  Assessment & Plan:  Continue metformin, A1c not at goal, discussed lifestyle modifications  Lab Results   Component Value Date    HGBA1C 7.7 (H) 11/21/2022    HGBA1C 8.3 (H) 08/04/2022    LDLCALC 68.2 11/21/2022    LABMICR <5.0 08/04/2022    CREATRANDUR 94.0 08/04/2022    MICALBCREAT Unable to calculate 08/04/2022        Orders:  -     insulin (LANTUS SOLOSTAR U-100 INSULIN) glargine 100 units/mL SubQ pen; Inject 8 Units into the skin every evening.  Dispense: 9 mL; Refill: 3    4. Cervical myelopathy  Overview:  Underwent decompression on 2/13 with Dr. David Dillon  C-collar at all times.   Follow up as scheduled with Dr. Dillon      5. PAD (peripheral artery disease)  Overview:  Followed by Cardiology, continue current treatment plan       6. OAB (overactive bladder)  -     oxybutynin (DITROPAN-XL) 5 MG TR24; Take 1 tablet (5 mg total) by mouth once daily.  Dispense: 90 tablet; Refill: 3    7. Chronic GERD  -     omeprazole (PRILOSEC) 20 MG capsule; Take 1 capsule (20 mg total) by mouth once daily.  Dispense: 90 capsule; Refill: 3    8. Osteopenia of multiple sites  Overview:  DEXA 9/2021    Assessment & Plan:  DEXA UTD      9. Chronic pain of both knees  -     celecoxib (CELEBREX) 200 MG capsule; Take 1 capsule (200 mg total) by mouth once daily.  Dispense: 90 capsule; Refill: 3    10. Cough, unspecified type  -     ipratropium-albuteroL  (COMBIVENT RESPIMAT)  mcg/actuation inhaler; Inhale 1 puff into the lungs every 6 (six) hours as needed for Wheezing. Rescue  Dispense: 12 g; Refill: 5        Annual scheduled with PCP  Schedule mammogram  [place] for recent eye exam done  Discussed shingrix and Tdap vaccines, advised can be obtained at pharmacy.  Discussed Covid booster, scheduling information given.  Health Maintenance reviewed/updated.

## 2023-03-17 NOTE — ASSESSMENT & PLAN NOTE
Continue pravastatin  Lab Results   Component Value Date    CHOL 128 11/21/2022    LDLCALC 68.2 11/21/2022    TRIG 99 11/21/2022    HDL 40 11/21/2022    ALT 13 11/21/2022    AST 14 11/21/2022    ALKPHOS 42 (L) 11/21/2022

## 2023-03-17 NOTE — PATIENT INSTRUCTIONS
Check with your pharmacist regarding Tdap (tetanus/diphtheria/pertussis) vaccine.     Check with your pharmacist regarding shingrix vaccine.    2 weeks before appt with Dr. Dorado, please call clinic to get fasting labs (in [facility]).

## 2023-03-17 NOTE — ASSESSMENT & PLAN NOTE
Continue metformin, A1c not at goal, discussed lifestyle modifications  Lab Results   Component Value Date    HGBA1C 7.7 (H) 11/21/2022    HGBA1C 8.3 (H) 08/04/2022    LDLCALC 68.2 11/21/2022    LABMICR <5.0 08/04/2022    CREATRANDUR 94.0 08/04/2022    MICALBCREAT Unable to calculate 08/04/2022

## 2023-04-11 DIAGNOSIS — Z79.4 UNCONTROLLED TYPE 2 DIABETES MELLITUS WITH HYPERGLYCEMIA, WITH LONG-TERM CURRENT USE OF INSULIN: Primary | ICD-10-CM

## 2023-04-11 DIAGNOSIS — E11.65 UNCONTROLLED TYPE 2 DIABETES MELLITUS WITH HYPERGLYCEMIA, WITH LONG-TERM CURRENT USE OF INSULIN: Primary | ICD-10-CM

## 2023-04-11 RX ORDER — METFORMIN HYDROCHLORIDE 1000 MG/1
1000 TABLET ORAL 2 TIMES DAILY
Qty: 180 TABLET | Refills: 1 | Status: SHIPPED | OUTPATIENT
Start: 2023-04-11 | End: 2023-07-28

## 2023-04-11 NOTE — TELEPHONE ENCOUNTER
----- Message from Montez Meza sent at 4/11/2023 10:09 AM CDT -----  Contact: [phone]  Calling regarding medication ,metFORMIN (GLUCOPHAGE) 1000 MG tablet , [facility] pharmacy is needing the updated increased dosage script . Please call back at [phone], daughter would also like a call back regarding pt swollen feet . [id number] - [place] - [address]  [address] [place] [address]  Phone: [phone] Fax: [phone]

## 2023-04-11 NOTE — TELEPHONE ENCOUNTER
Daughter stated that pt supposed to be taking Metformin 1000 mg twice daily but no new Rx was sent so patient been taking 2 of 500 mg. Can a Rx be written for the 1000 mg? Can you please advise. Daughter also stated that swollen in legs is still present despite taking the HCTZ. I scheduled patient with Sonya Lantigua for Monday 4/17/23.

## 2023-04-11 NOTE — TELEPHONE ENCOUNTER
Care Due:                  Date            Visit Type   Department     Provider  --------------------------------------------------------------------------------                                NEW PATIENT                              - OPEN       [facility] INTERNAL  Last Visit: 04-      [facility]   ALEX Dorado                              EP -                              PRIMARY      [facility] INTERNAL  Next Visit: 09-      CARE ([facility])   [facility]       Angelita Dorado                                                            Last  Test          Frequency    Reason                     Performed    Due Date  --------------------------------------------------------------------------------    Office Visit  12 months..  pravastatin..............  04- 04-    [facility] Embedded Care Gaps. Reference number: [id number]. 4/11/2023   1:39:09 PM CDT

## 2023-04-13 RX ORDER — PEN NEEDLE, DIABETIC 30 GX3/16"
1 NEEDLE, DISPOSABLE MISCELLANEOUS 2 TIMES DAILY
Qty: 200 EACH | Refills: 1 | Status: SHIPPED | OUTPATIENT
Start: 2023-04-13

## 2023-04-13 NOTE — TELEPHONE ENCOUNTER
Refill Routing Note   Medication(s) are not appropriate for processing by [facility] for the following reason(s):      No active prescription written by PCP    [facility] action(s):  Route [facility] due          Appointments  past 12m or future 3m with PCP    Date Provider   Last Visit   4/12/2022 Angelita Dorado, DO   Next Visit   9/18/2023 Angelita Dorado, DO   ED visits in past 90 days: 0        Note composed:10:53 AM 04/13/2023

## 2023-04-13 NOTE — TELEPHONE ENCOUNTER
Care Due:                  Date            Visit Type   Department     Provider  --------------------------------------------------------------------------------                                NEW PATIENT                              - OPEN       [facility] INTERNAL  Last Visit: 04-      [facility]   ALEX Dorado                              EP -                              PRIMARY      [facility] INTERNAL  Next Visit: 09-      CARE ([facility])   [facility]       Angelita Dorado                                                            Last  Test          Frequency    Reason                     Performed    Due Date  --------------------------------------------------------------------------------    HBA1C.......  6 months...  metFORMIN................  11- 05-    [employer] Embedded Care Gaps. Reference number: [id number]. 4/13/2023   8:41:51 AM CDT

## 2023-04-20 ENCOUNTER — PATIENT MESSAGE (OUTPATIENT)
Dept: RESEARCH | Facility: HOSPITAL | Age: 73
End: 2023-04-20
Payer: MEDICARE

## 2023-04-26 DIAGNOSIS — I73.9 PAD (PERIPHERAL ARTERY DISEASE): ICD-10-CM

## 2023-04-27 RX ORDER — CILOSTAZOL 50 MG/1
TABLET ORAL
Qty: 60 TABLET | Refills: 9 | OUTPATIENT
Start: 2023-04-27

## 2023-04-27 NOTE — TELEPHONE ENCOUNTER
Pt contacted. Spoke to daughter about refill medication. Haven't seen doctor in 2 years. Daughter stated shell call back for appt time and location.

## 2023-04-27 NOTE — TELEPHONE ENCOUNTER
Spoke with taurus pt daughter scheduled appt for 5-18-23. Pt needs her pravastatin refilled.       ----- Message from Apoorva Austin sent at 4/27/2023 11:16 AM CDT -----  Contact: Taurus-- [phone]  Patient is returning a phone call.  Who left a message for the patient: Clara     Does patient know what this is regarding:  Yes     Would you like a call back, or a response through your [facility] portal?:   call back     Comments:  would like to schedule an appt for Monday 5.1.23.

## 2023-04-28 RX ORDER — PRAVASTATIN SODIUM 80 MG/1
80 TABLET ORAL NIGHTLY
Qty: 90 TABLET | Refills: 2 | Status: SHIPPED | OUTPATIENT
Start: 2023-04-28 | End: 2023-05-18 | Stop reason: SDUPTHER

## 2023-05-12 ENCOUNTER — OFFICE VISIT (OUTPATIENT)
Dept: PODIATRY | Facility: CLINIC | Age: 73
End: 2023-05-12
Payer: MEDICARE

## 2023-05-12 VITALS — HEIGHT: 64 IN | WEIGHT: 196.44 LBS | BODY MASS INDEX: 33.54 KG/M2

## 2023-05-12 DIAGNOSIS — E11.51 CONTROLLED TYPE 2 DM WITH PERIPHERAL CIRCULATORY DISORDER: Primary | ICD-10-CM

## 2023-05-12 DIAGNOSIS — B35.1 DERMATOPHYTOSIS OF NAIL: ICD-10-CM

## 2023-05-12 DIAGNOSIS — I73.9 PAD (PERIPHERAL ARTERY DISEASE): ICD-10-CM

## 2023-05-12 PROCEDURE — 99499 NO LOS: ICD-10-PCS | Mod: ,,, | Performed by: PODIATRIST

## 2023-05-12 PROCEDURE — 99213 OFFICE O/P EST LOW 20 MIN: CPT | Performed by: PODIATRIST

## 2023-05-12 PROCEDURE — 11721 DEBRIDE NAIL 6 OR MORE: CPT | Mod: Q8,S$GLB,, | Performed by: PODIATRIST

## 2023-05-12 PROCEDURE — 99499 UNLISTED E&M SERVICE: CPT | Mod: ,,, | Performed by: PODIATRIST

## 2023-05-12 PROCEDURE — 11721 PR DEBRIDEMENT OF NAILS, 6 OR MORE: ICD-10-PCS | Mod: Q8,S$GLB,, | Performed by: PODIATRIST

## 2023-05-12 NOTE — PROGRESS NOTES
Subjective:     Patient ID: Maryjane Mcintyre is a 72 y.o. female.    Chief Complaint: Nail Care (Diabetic pt wears tennis shoes, PCP Dr. Dorado last seen 3-17-23)    Maryjane is a 72 y.o. female who presents to the clinic for evaluation and treatment of high risk feet. Maryjane has a past medical history of Arthritis, Diabetes mellitus, type 2, GERD (gastroesophageal reflux disease), Gout, Hyperlipidemia, Hypertension, and Onychomycosis. The patient's chief complaint is long, thick toenails. This patient has documented high risk feet requiring routine maintenance secondary to diabetes mellitis and those secondary complications of diabetes, as mentioned..    PCP: Angelita Dorado, DO    Date Last Seen by PCP: 03/17/2023    Current shoe gear:  Affected Foot: Tennis shoes     Unaffected Foot: Tennis shoes    Hemoglobin A1C   Date Value Ref Range Status   11/21/2022 7.7 (H) 4.0 - 5.6 % Final     Comment:     ADA Screening Guidelines:  5.7-6.4%  Consistent with prediabetes  >or=6.5%  Consistent with diabetes    High levels of fetal hemoglobin interfere with the HbA1C  assay. Heterozygous hemoglobin variants (HbS, HgC, etc)do  not significantly interfere with this assay.   However, presence of multiple variants may affect accuracy.     08/04/2022 8.3 (H) 4.0 - 5.6 % Final     Comment:     ADA Screening Guidelines:  5.7-6.4%  Consistent with prediabetes  >or=6.5%  Consistent with diabetes    High levels of fetal hemoglobin interfere with the HbA1C  assay. Heterozygous hemoglobin variants (HbS, HgC, etc)do  not significantly interfere with this assay.   However, presence of multiple variants may affect accuracy.     04/12/2022 8.0 (H) 4.0 - 5.6 % Final     Comment:     ADA Screening Guidelines:  5.7-6.4%  Consistent with prediabetes  >or=6.5%  Consistent with diabetes    High levels of fetal hemoglobin interfere with the HbA1C  assay. Heterozygous hemoglobin variants (HbS, HgC, etc)do  not significantly interfere with this assay.    However, presence of multiple variants may affect accuracy.         Patient Active Problem List   Diagnosis    Type 2 diabetes mellitus with circulatory disorder, with long-term current use of insulin    PAD (peripheral artery disease)    Baker cyst, left    Hypertension associated with diabetes    Smoker    Hyperlipidemia associated with type 2 diabetes mellitus    Onychomycosis    Gout    Dysphagia    Osteopenia of multiple sites    Asymptomatic bilateral carotid artery stenosis    Cervical myelopathy    Chronic cerebrovascular accident (CVA)    Neck pain       Medication List with Changes/Refills   Current Medications    ACETAMINOPHEN (TYLENOL) 160 MG/5 ML LIQD    Take 100 mg by mouth daily as needed. Take one tablet by mouth once daily as needed    AMLODIPINE (NORVASC) 10 MG TABLET    Take 1 tablet (10 mg total) by mouth once daily.    CELECOXIB (CELEBREX) 200 MG CAPSULE    Take 1 capsule (200 mg total) by mouth once daily.    CHOLECALCIFEROL, VITAMIN D3, 5,000 UNIT CAPSULE    Take 5,000 Units by mouth once daily.    CILOSTAZOL (PLETAL) 50 MG TAB    TAKE 1 TABLET(50 MG) BY MOUTH TWICE DAILY    FLUTICASONE PROPIONATE (FLONASE) 50 MCG/ACTUATION NASAL SPRAY    2 sprays both nostrils daily prn    HYDROCHLOROTHIAZIDE (HYDRODIURIL) 12.5 MG TAB    Take 1 tablet (12.5 mg total) by mouth once daily.    INSULIN (LANTUS SOLOSTAR U-100 INSULIN) GLARGINE 100 UNITS/ML SUBQ PEN    Inject 8 Units into the skin every evening.    IPRATROPIUM-ALBUTEROL (COMBIVENT RESPIMAT)  MCG/ACTUATION INHALER    Inhale 1 puff into the lungs every 6 (six) hours as needed for Wheezing. Rescue    LABETALOL (NORMODYNE) 300 MG TABLET    Take 1 tablet (300 mg total) by mouth every 12 (twelve) hours.    LOSARTAN (COZAAR) 100 MG TABLET    Take 1 tablet (100 mg total) by mouth once daily.    METFORMIN (GLUCOPHAGE) 1000 MG TABLET    Take 1 tablet (1,000 mg total) by mouth 2 (two) times daily.    NOVOLIN R FLEXPEN 100 UNIT/ML (3 ML) INPN PEN     "Inject into the skin.    OMEPRAZOLE (PRILOSEC) 20 MG CAPSULE    Take 1 capsule (20 mg total) by mouth once daily.    OXYBUTYNIN (DITROPAN-XL) 5 MG TR24    Take 1 tablet (5 mg total) by mouth once daily.    PEN NEEDLE, DIABETIC (BD MAGDALENA 2ND GEN PEN NEEDLE) 32 GAUGE X 5/32" NDLE    1 each by Other route 2 (two) times a day.    PRAVASTATIN (PRAVACHOL) 80 MG TABLET    Take 1 tablet (80 mg total) by mouth every evening.       Review of patient's allergies indicates:  No Known Allergies    Past Surgical History:   Procedure Laterality Date    Cardiac Echo      COLONOSCOPY N/A 10/19/2020    Procedure: COLONOSCOPY;  Surgeon: Yousuf Fuller MD;  Location: [facility] [place] [facility];  Service: Endoscopy;  Laterality: N/A;    ESOPHAGOGASTRODUODENOSCOPY N/A 10/19/2020    Procedure: ESOPHAGOGASTRODUODENOSCOPY (EGD);  Surgeon: Yousuf Fuller MD;  Location: [facility] [place] END[facility];  Service: Endoscopy;  Laterality: N/A;    GALLBLADDER SURGERY      HYSTERECTOMY         Family History   Problem Relation Age of Onset    Diabetes Father     Hypertension Father     Heart attack Sister     Colon cancer Sister     Stroke Sister     Colon cancer Brother        Social History     Socioeconomic History    Marital status:    Tobacco Use    Smoking status: Former     Packs/day: 1.00     Types: Cigarettes    Smokeless tobacco: Never    Tobacco comments:     Quit 2019 mid year   Substance and Sexual Activity    Alcohol use: No    Drug use: Never    Sexual activity: Not Currently       Vitals:    05/12/23 0828   Weight: 89.1 kg (196 lb 6.9 oz)   Height: 5' 4" (1.626 m)   PainSc: 0-No pain       Hemoglobin A1C   Date Value Ref Range Status   11/21/2022 7.7 (H) 4.0 - 5.6 % Final     Comment:     ADA Screening Guidelines:  5.7-6.4%  Consistent with prediabetes  >or=6.5%  Consistent with diabetes    High levels of fetal hemoglobin interfere with the HbA1C  assay. Heterozygous hemoglobin variants (HbS, HgC, etc)do  not significantly interfere with this assay. "   However, presence of multiple variants may affect accuracy.     08/04/2022 8.3 (H) 4.0 - 5.6 % Final     Comment:     ADA Screening Guidelines:  5.7-6.4%  Consistent with prediabetes  >or=6.5%  Consistent with diabetes    High levels of fetal hemoglobin interfere with the HbA1C  assay. Heterozygous hemoglobin variants (HbS, HgC, etc)do  not significantly interfere with this assay.   However, presence of multiple variants may affect accuracy.     04/12/2022 8.0 (H) 4.0 - 5.6 % Final     Comment:     ADA Screening Guidelines:  5.7-6.4%  Consistent with prediabetes  >or=6.5%  Consistent with diabetes    High levels of fetal hemoglobin interfere with the HbA1C  assay. Heterozygous hemoglobin variants (HbS, HgC, etc)do  not significantly interfere with this assay.   However, presence of multiple variants may affect accuracy.         Review of Systems   Constitutional:  Negative for chills and fever.   Respiratory:  Negative for shortness of breath.    Cardiovascular:  Negative for chest pain, palpitations, orthopnea, claudication and leg swelling.   Gastrointestinal:  Negative for diarrhea, nausea and vomiting.   Musculoskeletal:  Negative for joint pain.   Skin:  Negative for rash.   Neurological:  Negative for dizziness, tingling, sensory change, focal weakness and weakness.   Psychiatric/Behavioral: Negative.             Objective:      PHYSICAL EXAM: Apperance: Alert and orient in no distress,well developed, and with good attention to grooming and body habits  Patient presents ambulating in tennis shoes.   LOWER EXTREMITY EXAM:  VASCULAR: Dorsalis pedis pulses 0/4 bilateral and Posterior Tibial pulses 1/4 bilateral. Capillary fill time <blanched bilateral. Mild edema observed bilateral. Varicosities present bilateral. Skin temperature of the lower extremities is warm to warm, proximal to distal. Hair growth absent bilateral.  DERMATOLOGICAL: No skin rashes, subcutaneous nodules, lesions, or ulcers observed  bilateral. Nails 1,2,3,4,5 bilateral elongated, thickened, and discolored with subungual debris. Webspaces 1,2,3,4 bilateral clean, dry and without evidence of break in skin integrity.   NEUROLOGICAL: Light touch, sharp-dull, proprioception all present and equal bilaterally.  Vibratory sensation diminished. Protective sensation absent sites as tested with a [place]ill[place]-Jones 5.07 monofilament.   MUSCULOSKELETAL: Muscle strength is 5/5 for foot inverters, everters, plantarflexors, and dorsiflexors. Muscle tone is normal.         Assessment:       ICD-10-CM ICD-9-CM   1. Controlled type 2 DM with peripheral circulatory disorder  E11.51 250.70     443.81   2. Dermatophytosis of nail  B35.1 110.1       Plan:   Controlled type 2 DM with peripheral circulatory disorder    Dermatophytosis of nail    I counseled the patient on her conditions, regarding findings of my examination, my impressions, and usual treatment plan.   Appointment spent on education about the diabetic foot, neuropathy, and prevention of limb loss.  Shoe inspection. Diabetic Foot Education. Patient reminded of the importance of good nutrition and blood sugar control to help prevent podiatric complications of diabetes. Patient instructed on proper foot hygeine. We discussed wearing proper shoe gear, daily foot inspections, never walking without protective shoe gear, never putting sharp instruments to feet.    With patient's permission, nails 1-5 bilateral were debrided/trimmed in length and thickness aggressively to their soft tissue attachment mechanically and with electric , removing all offending nail and debris. Patient relates relief following the procedure.  Patient  will continue to monitor the areas daily, inspect feet, wear protective shoe gear when ambulatory, moisturizer to maintain skin integrity. Patient reminded of the importance of good nutrition and blood sugar control to help prevent podiatric complications of diabetes.  Patient to  return 6 months or sooner if needed.              Jacek Dooley DPM  [facility]

## 2023-05-16 DIAGNOSIS — Z00.00 ROUTINE HEALTH MAINTENANCE: ICD-10-CM

## 2023-05-16 DIAGNOSIS — I73.9 PAD (PERIPHERAL ARTERY DISEASE): Primary | ICD-10-CM

## 2023-05-18 ENCOUNTER — HOSPITAL ENCOUNTER (OUTPATIENT)
Dept: CARDIOLOGY | Facility: HOSPITAL | Age: 73
Discharge: HOME | End: 2023-05-18
Attending: INTERNAL MEDICINE
Payer: MEDICARE

## 2023-05-18 ENCOUNTER — OFFICE VISIT (OUTPATIENT)
Dept: CARDIOLOGY | Facility: CLINIC | Age: 73
End: 2023-05-18
Payer: MEDICARE

## 2023-05-18 VITALS
OXYGEN SATURATION: 99 % | HEART RATE: 77 BPM | SYSTOLIC BLOOD PRESSURE: 132 MMHG | WEIGHT: 148.56 LBS | HEIGHT: 64 IN | BODY MASS INDEX: 25.36 KG/M2 | DIASTOLIC BLOOD PRESSURE: 74 MMHG

## 2023-05-18 DIAGNOSIS — Z86.73 CHRONIC CEREBROVASCULAR ACCIDENT (CVA): ICD-10-CM

## 2023-05-18 DIAGNOSIS — E78.5 HYPERLIPIDEMIA ASSOCIATED WITH TYPE 2 DIABETES MELLITUS: Primary | ICD-10-CM

## 2023-05-18 DIAGNOSIS — E11.59 TYPE 2 DIABETES MELLITUS WITH OTHER CIRCULATORY COMPLICATION, WITH LONG-TERM CURRENT USE OF INSULIN: ICD-10-CM

## 2023-05-18 DIAGNOSIS — E11.59 HYPERTENSION ASSOCIATED WITH DIABETES: ICD-10-CM

## 2023-05-18 DIAGNOSIS — I15.2 HYPERTENSION ASSOCIATED WITH DIABETES: ICD-10-CM

## 2023-05-18 DIAGNOSIS — I73.9 PAD (PERIPHERAL ARTERY DISEASE): ICD-10-CM

## 2023-05-18 DIAGNOSIS — Z00.00 ROUTINE HEALTH MAINTENANCE: ICD-10-CM

## 2023-05-18 DIAGNOSIS — Z79.4 TYPE 2 DIABETES MELLITUS WITH OTHER CIRCULATORY COMPLICATION, WITH LONG-TERM CURRENT USE OF INSULIN: ICD-10-CM

## 2023-05-18 DIAGNOSIS — I10 HYPERTENSION GOAL BP (BLOOD PRESSURE) < 130/80: ICD-10-CM

## 2023-05-18 DIAGNOSIS — E11.69 HYPERLIPIDEMIA ASSOCIATED WITH TYPE 2 DIABETES MELLITUS: Primary | ICD-10-CM

## 2023-05-18 PROCEDURE — 3008F PR BODY MASS INDEX (BMI) DOCUMENTED: ICD-10-PCS | Mod: CPTII,,, | Performed by: INTERNAL MEDICINE

## 2023-05-18 PROCEDURE — 3008F BODY MASS INDEX DOCD: CPT | Mod: CPTII,,, | Performed by: INTERNAL MEDICINE

## 2023-05-18 PROCEDURE — 3288F FALL RISK ASSESSMENT DOCD: CPT | Mod: CPTII,,, | Performed by: INTERNAL MEDICINE

## 2023-05-18 PROCEDURE — 99214 PR OFFICE/OUTPT VISIT, EST, LEVL IV, 30-39 MIN: ICD-10-PCS | Mod: ,,, | Performed by: INTERNAL MEDICINE

## 2023-05-18 PROCEDURE — 93010 ELECTROCARDIOGRAM REPORT: CPT | Mod: ,,, | Performed by: INTERNAL MEDICINE

## 2023-05-18 PROCEDURE — 4010F PR ACE/ARB THEARPY RXD/TAKEN: ICD-10-PCS | Mod: CPTII,,, | Performed by: INTERNAL MEDICINE

## 2023-05-18 PROCEDURE — 1160F RVW MEDS BY RX/DR IN RCRD: CPT | Mod: CPTII,,, | Performed by: INTERNAL MEDICINE

## 2023-05-18 PROCEDURE — 1159F MED LIST DOCD IN RCRD: CPT | Mod: CPTII,,, | Performed by: INTERNAL MEDICINE

## 2023-05-18 PROCEDURE — 1159F PR MEDICATION LIST DOCUMENTED IN MEDICAL RECORD: ICD-10-PCS | Mod: CPTII,,, | Performed by: INTERNAL MEDICINE

## 2023-05-18 PROCEDURE — 1101F PT FALLS ASSESS-DOCD LE1/YR: CPT | Mod: CPTII,,, | Performed by: INTERNAL MEDICINE

## 2023-05-18 PROCEDURE — 99214 OFFICE O/P EST MOD 30 MIN: CPT | Mod: ,,, | Performed by: INTERNAL MEDICINE

## 2023-05-18 PROCEDURE — 3075F SYST BP GE 130 - 139MM HG: CPT | Mod: CPTII,,, | Performed by: INTERNAL MEDICINE

## 2023-05-18 PROCEDURE — 93005 ELECTROCARDIOGRAM TRACING: CPT

## 2023-05-18 PROCEDURE — 1101F PR PT FALLS ASSESS DOC 0-1 FALLS W/OUT INJ PAST YR: ICD-10-PCS | Mod: CPTII,,, | Performed by: INTERNAL MEDICINE

## 2023-05-18 PROCEDURE — 93010 EKG 12-LEAD: ICD-10-PCS | Mod: ,,, | Performed by: INTERNAL MEDICINE

## 2023-05-18 PROCEDURE — 3078F DIAST BP <80 MM HG: CPT | Mod: CPTII,,, | Performed by: INTERNAL MEDICINE

## 2023-05-18 PROCEDURE — 99999 PR PBB SHADOW E&M-EST. PATIENT-LVL IV: ICD-10-PCS | Mod: PBBFAC,,, | Performed by: INTERNAL MEDICINE

## 2023-05-18 PROCEDURE — 3288F PR FALLS RISK ASSESSMENT DOCUMENTED: ICD-10-PCS | Mod: CPTII,,, | Performed by: INTERNAL MEDICINE

## 2023-05-18 PROCEDURE — 1160F PR REVIEW ALL MEDS BY PRESCRIBER/CLIN PHARMACIST DOCUMENTED: ICD-10-PCS | Mod: CPTII,,, | Performed by: INTERNAL MEDICINE

## 2023-05-18 PROCEDURE — 4010F ACE/ARB THERAPY RXD/TAKEN: CPT | Mod: CPTII,,, | Performed by: INTERNAL MEDICINE

## 2023-05-18 PROCEDURE — 1126F PR PAIN SEVERITY QUANTIFIED, NO PAIN PRESENT: ICD-10-PCS | Mod: CPTII,,, | Performed by: INTERNAL MEDICINE

## 2023-05-18 PROCEDURE — 3075F PR MOST RECENT SYSTOLIC BLOOD PRESS GE 130-139MM HG: ICD-10-PCS | Mod: CPTII,,, | Performed by: INTERNAL MEDICINE

## 2023-05-18 PROCEDURE — 1126F AMNT PAIN NOTED NONE PRSNT: CPT | Mod: CPTII,,, | Performed by: INTERNAL MEDICINE

## 2023-05-18 PROCEDURE — 3078F PR MOST RECENT DIASTOLIC BLOOD PRESSURE < 80 MM HG: ICD-10-PCS | Mod: CPTII,,, | Performed by: INTERNAL MEDICINE

## 2023-05-18 PROCEDURE — 99214 OFFICE O/P EST MOD 30 MIN: CPT | Performed by: INTERNAL MEDICINE

## 2023-05-18 PROCEDURE — 99999 PR PBB SHADOW E&M-EST. PATIENT-LVL IV: CPT | Mod: PBBFAC,,, | Performed by: INTERNAL MEDICINE

## 2023-05-18 RX ORDER — CILOSTAZOL 50 MG/1
50 TABLET ORAL 2 TIMES DAILY
Qty: 90 TABLET | Refills: 3 | Status: SHIPPED | OUTPATIENT
Start: 2023-05-18 | End: 2023-12-11

## 2023-05-18 RX ORDER — AMLODIPINE BESYLATE 10 MG/1
10 TABLET ORAL DAILY
Qty: 90 TABLET | Refills: 1 | Status: SHIPPED | OUTPATIENT
Start: 2023-05-18 | End: 2024-01-08

## 2023-05-18 RX ORDER — PRAVASTATIN SODIUM 80 MG/1
80 TABLET ORAL NIGHTLY
Qty: 90 TABLET | Refills: 2 | Status: SHIPPED | OUTPATIENT
Start: 2023-05-18 | End: 2023-10-11 | Stop reason: SINTOL

## 2023-05-18 RX ORDER — HYDROCHLOROTHIAZIDE 12.5 MG/1
12.5 TABLET ORAL DAILY
Qty: 90 TABLET | Refills: 1 | Status: SHIPPED | OUTPATIENT
Start: 2023-05-18 | End: 2023-10-31 | Stop reason: SDUPTHER

## 2023-05-18 RX ORDER — LABETALOL 300 MG/1
300 TABLET, FILM COATED ORAL EVERY 12 HOURS
Qty: 180 TABLET | Refills: 3 | Status: SHIPPED | OUTPATIENT
Start: 2023-05-18 | End: 2024-05-17

## 2023-05-18 RX ORDER — LOSARTAN POTASSIUM 100 MG/1
100 TABLET ORAL DAILY
Qty: 90 TABLET | Refills: 1 | Status: SHIPPED | OUTPATIENT
Start: 2023-05-18 | End: 2024-02-21

## 2023-05-18 NOTE — PROGRESS NOTES
Subjective:   Patient ID:  Maryjane Mcintyre is a 72 y.o. female who presents for follow up of Foot Swelling      71 yo female, routine f/u  PMH HTN, PAD, and HLD. H/o CVA in , DM. S/p cervical fusion procedure after fall. smoking for 50 yrs, quit now. No drinking.   Echo in  normal EF, LAE and LVH  Holter no arrhythmia  ekg today NSR and nonspecific STT change   LE arterial US 50-75% stenosis in the right proximal femoral artery and left popliteal artery. And left Thompson's cyst. +plauqe   DIMITRIOS  Right 0.81 and left 0.87  C/oknee pain, L> R. Occurred at walk. No calf pain and lower back pain.  No leg weakness, resting pain and coldness.  No chest pain, palpitation, faint and orthopnea.  FREDERICK chronic  BP LDL and A1c conreolled     Interval history  Last seen in . H/o CVA in  and Rx for DM.  Quit smoking in 2019.  On home PT pending  No chest pain dyspnea syncope. Some feet swelling. Sleeps on 2 pillows  Occasional dizziness and fall   LDL 61 in 02-23 at [facility]  02-23 echo done at [facility]: ef 65% mild TR and negative bubble study          Past Medical History:   Diagnosis Date    Arthritis     Diabetes mellitus, type 2     GERD (gastroesophageal reflux disease)     Gout     Hyperlipidemia     Hypertension     Onychomycosis        Past Surgical History:   Procedure Laterality Date    Cardiac Echo      COLONOSCOPY N/A 10/19/2020    Procedure: COLONOSCOPY;  Surgeon: Yousuf Fuller MD;  Location: [facility];  Service: Endoscopy;  Laterality: N/A;    ESOPHAGOGASTRODUODENOSCOPY N/A 10/19/2020    Procedure: ESOPHAGOGASTRODUODENOSCOPY (EGD);  Surgeon: Yousuf Fuller MD;  Location: [facility] ENDO;  Service: Endoscopy;  Laterality: N/A;    GALLBLADDER SURGERY      HYSTERECTOMY         Social History     Tobacco Use    Smoking status: Former     Packs/day: 1.00     Types: Cigarettes    Smokeless tobacco: Never    Tobacco comments:     Quit 2019 mid year   Substance Use Topics    Alcohol use: No     Drug use: Never       Family History   Problem Relation Age of Onset    Diabetes Father     Hypertension Father     Heart attack Sister     Colon cancer Sister     Stroke Sister     Colon cancer Brother          ROS    Objective:   Physical Exam  HENT:      Head: Normocephalic.   Eyes:      Pupils: Pupils are equal, round, and reactive to light.   Neck:      Thyroid: No thyromegaly.      Vascular: Normal carotid pulses. No carotid bruit or JVD.   Cardiovascular:      Rate and Rhythm: Normal rate and regular rhythm. No extrasystoles are present.     Chest Wall: PMI is not displaced.      Pulses: Normal pulses.      Heart sounds: Murmur (ESM 2/6 on RUSB and mid LSB) heard.     No gallop. No S3 sounds.   Pulmonary:      Effort: No respiratory distress.      Breath sounds: No stridor.   Abdominal:      General: Bowel sounds are normal.      Palpations: Abdomen is soft.      Tenderness: There is no abdominal tenderness. There is no rebound.   Skin:     Findings: No rash.   Neurological:      Mental Status: She is alert and oriented to person, place, and time.   Psychiatric:         Behavior: Behavior normal.       Lab Results   Component Value Date    CHOL 128 11/21/2022    CHOL 115 (L) 08/04/2022    CHOL 140 04/12/2022     Lab Results   Component Value Date    HDL 40 11/21/2022    HDL 42 08/04/2022    HDL 44 04/12/2022     Lab Results   Component Value Date    LDLCALC 68.2 11/21/2022    LDLCALC 57.6 (L) 08/04/2022    LDLCALC 70.4 04/12/2022     Lab Results   Component Value Date    TRIG 99 11/21/2022    TRIG 77 08/04/2022    TRIG 128 04/12/2022     Lab Results   Component Value Date    CHOLHDL 31.3 11/21/2022    CHOLHDL 36.5 08/04/2022    CHOLHDL 31.4 04/12/2022       Chemistry        Component Value Date/Time     11/21/2022 0750    K 3.4 (L) 11/21/2022 0750     11/21/2022 0750    CO2 23 11/21/2022 0750    BUN 18 11/21/2022 0750    CREATININE 0.9 11/21/2022 0750     (H) 11/21/2022 0750         Component Value Date/Time    CALCIUM 10.0 11/21/2022 0750    ALKPHOS 42 (L) 11/21/2022 0750    AST 14 11/21/2022 0750    ALT 13 11/21/2022 0750    BILITOT 0.5 11/21/2022 0750    ESTGFRAFRICA >60.0 04/12/2022 1108    EGFRNONAA >60.0 04/12/2022 1108          Lab Results   Component Value Date    HGBA1C 7.7 (H) 11/21/2022     No results found for: TSH  No results found for: INR, PROTIME  No results found for: WBC, HGB, HCT, MCV, PLT  BMP  Sodium   Date Value Ref Range Status   11/21/2022 141 136 - 145 mmol/L Final     Potassium   Date Value Ref Range Status   11/21/2022 3.4 (L) 3.5 - 5.1 mmol/L Final     Chloride   Date Value Ref Range Status   11/21/2022 109 95 - 110 mmol/L Final     CO2   Date Value Ref Range Status   11/21/2022 23 23 - 29 mmol/L Final     BUN   Date Value Ref Range Status   11/21/2022 18 8 - 23 mg/dL Final     Creatinine   Date Value Ref Range Status   11/21/2022 0.9 0.5 - 1.4 mg/dL Final     Calcium   Date Value Ref Range Status   11/21/2022 10.0 8.7 - 10.5 mg/dL Final     Anion Gap   Date Value Ref Range Status   11/21/2022 9 8 - 16 mmol/L Final     eGFR if    Date Value Ref Range Status   04/12/2022 >60.0 >60 mL/min/1.73 m^2 Final     eGFR if non    Date Value Ref Range Status   04/12/2022 >60.0 >60 mL/min/1.73 m^2 Final     Comment:     Calculation used to obtain the estimated glomerular filtration  rate (eGFR) is the CKD-EPI equation.        BNP  @LABRCNTIP(BNP,BNPTRIAGEBLO)@  @LABRCNTIP(troponini)@  CrCl cannot be calculated (Patient's most recent lab result is older than the maximum 7 days allowed.).  No results found in the last 24 hours.  No results found in the last 24 hours.  No results found in the last 24 hours.    Assessment:      1. Hyperlipidemia associated with type 2 diabetes mellitus    2. Hypertension goal BP (blood pressure) < 130/80    3. Hypertension associated with diabetes    4. PAD (peripheral artery disease)    5. Type 2 diabetes mellitus  with other circulatory complication, with long-term current use of insulin    6. Chronic cerebrovascular accident (CVA)        Plan:   Continue Pletal statin amlodipien labetolol losartn and HCTZ  DM Rx per PCP  Counseled DASH  Check Lipid profile with PCP in 6 months  Recommend heart-healthy diet, weight control   Bijan. Risk modification.   I have reviewed all pertinent labs and cardiac studies independently. Plans and recommendations have been formulated under my direct supervision. All questions answered and patient voiced understanding.   If symptoms persist go to the ED  RTC in 6 months

## 2023-05-24 ENCOUNTER — HOSPITAL ENCOUNTER (OUTPATIENT)
Dept: RADIOLOGY | Facility: HOSPITAL | Age: 73
Discharge: HOME | End: 2023-05-24
Attending: PHYSICIAN ASSISTANT
Payer: MEDICARE

## 2023-05-24 VITALS — BODY MASS INDEX: 25.36 KG/M2 | WEIGHT: 148.56 LBS | HEIGHT: 64 IN

## 2023-05-24 DIAGNOSIS — Z12.31 ENCOUNTER FOR SCREENING MAMMOGRAM FOR MALIGNANT NEOPLASM OF BREAST: ICD-10-CM

## 2023-05-24 PROCEDURE — 77063 BREAST TOMOSYNTHESIS BI: CPT | Mod: 26,,, | Performed by: RADIOLOGY

## 2023-05-24 PROCEDURE — 77067 MAMMO DIGITAL SCREENING BILAT WITH TOMO: ICD-10-PCS | Mod: 26,,, | Performed by: RADIOLOGY

## 2023-05-24 PROCEDURE — 77067 SCR MAMMO BI INCL CAD: CPT | Mod: TC,PO

## 2023-05-24 PROCEDURE — 77067 SCR MAMMO BI INCL CAD: CPT | Mod: 26,,, | Performed by: RADIOLOGY

## 2023-05-24 PROCEDURE — 77063 MAMMO DIGITAL SCREENING BILAT WITH TOMO: ICD-10-PCS | Mod: 26,,, | Performed by: RADIOLOGY

## 2023-06-09 ENCOUNTER — OFFICE VISIT (OUTPATIENT)
Dept: INTERNAL MEDICINE | Facility: CLINIC | Age: 73
End: 2023-06-09
Payer: MEDICARE

## 2023-06-09 VITALS
OXYGEN SATURATION: 97 % | TEMPERATURE: 98 F | RESPIRATION RATE: 18 BRPM | DIASTOLIC BLOOD PRESSURE: 70 MMHG | SYSTOLIC BLOOD PRESSURE: 130 MMHG | BODY MASS INDEX: 26.05 KG/M2 | HEIGHT: 64 IN | HEART RATE: 80 BPM | WEIGHT: 152.56 LBS

## 2023-06-09 DIAGNOSIS — E11.59 TYPE 2 DIABETES MELLITUS WITH OTHER CIRCULATORY COMPLICATION, WITH LONG-TERM CURRENT USE OF INSULIN: ICD-10-CM

## 2023-06-09 DIAGNOSIS — E11.69 HYPERLIPIDEMIA ASSOCIATED WITH TYPE 2 DIABETES MELLITUS: ICD-10-CM

## 2023-06-09 DIAGNOSIS — I15.2 HYPERTENSION ASSOCIATED WITH DIABETES: Primary | ICD-10-CM

## 2023-06-09 DIAGNOSIS — I65.23 ASYMPTOMATIC BILATERAL CAROTID ARTERY STENOSIS: ICD-10-CM

## 2023-06-09 DIAGNOSIS — E11.59 HYPERTENSION ASSOCIATED WITH DIABETES: Primary | ICD-10-CM

## 2023-06-09 DIAGNOSIS — Z79.4 TYPE 2 DIABETES MELLITUS WITH OTHER CIRCULATORY COMPLICATION, WITH LONG-TERM CURRENT USE OF INSULIN: ICD-10-CM

## 2023-06-09 DIAGNOSIS — E78.5 HYPERLIPIDEMIA ASSOCIATED WITH TYPE 2 DIABETES MELLITUS: ICD-10-CM

## 2023-06-09 PROCEDURE — 1160F RVW MEDS BY RX/DR IN RCRD: CPT | Mod: CPTII,S$GLB,, | Performed by: PHYSICIAN ASSISTANT

## 2023-06-09 PROCEDURE — 3075F SYST BP GE 130 - 139MM HG: CPT | Mod: CPTII,S$GLB,, | Performed by: PHYSICIAN ASSISTANT

## 2023-06-09 PROCEDURE — 1159F MED LIST DOCD IN RCRD: CPT | Mod: CPTII,S$GLB,, | Performed by: PHYSICIAN ASSISTANT

## 2023-06-09 PROCEDURE — 3052F HG A1C>EQUAL 8.0%<EQUAL 9.0%: CPT | Mod: CPTII,S$GLB,, | Performed by: PHYSICIAN ASSISTANT

## 2023-06-09 PROCEDURE — 1160F PR REVIEW ALL MEDS BY PRESCRIBER/CLIN PHARMACIST DOCUMENTED: ICD-10-PCS | Mod: CPTII,S$GLB,, | Performed by: PHYSICIAN ASSISTANT

## 2023-06-09 PROCEDURE — 3052F PR MOST RECENT HEMOGLOBIN A1C LEVEL 8.0 - < 9.0%: ICD-10-PCS | Mod: CPTII,S$GLB,, | Performed by: PHYSICIAN ASSISTANT

## 2023-06-09 PROCEDURE — 1126F PR PAIN SEVERITY QUANTIFIED, NO PAIN PRESENT: ICD-10-PCS | Mod: CPTII,S$GLB,, | Performed by: PHYSICIAN ASSISTANT

## 2023-06-09 PROCEDURE — 1159F PR MEDICATION LIST DOCUMENTED IN MEDICAL RECORD: ICD-10-PCS | Mod: CPTII,S$GLB,, | Performed by: PHYSICIAN ASSISTANT

## 2023-06-09 PROCEDURE — 3078F DIAST BP <80 MM HG: CPT | Mod: CPTII,S$GLB,, | Performed by: PHYSICIAN ASSISTANT

## 2023-06-09 PROCEDURE — 4010F ACE/ARB THERAPY RXD/TAKEN: CPT | Mod: CPTII,S$GLB,, | Performed by: PHYSICIAN ASSISTANT

## 2023-06-09 PROCEDURE — 4010F PR ACE/ARB THEARPY RXD/TAKEN: ICD-10-PCS | Mod: CPTII,S$GLB,, | Performed by: PHYSICIAN ASSISTANT

## 2023-06-09 PROCEDURE — 3075F PR MOST RECENT SYSTOLIC BLOOD PRESS GE 130-139MM HG: ICD-10-PCS | Mod: CPTII,S$GLB,, | Performed by: PHYSICIAN ASSISTANT

## 2023-06-09 PROCEDURE — 3008F BODY MASS INDEX DOCD: CPT | Mod: CPTII,S$GLB,, | Performed by: PHYSICIAN ASSISTANT

## 2023-06-09 PROCEDURE — 3008F PR BODY MASS INDEX (BMI) DOCUMENTED: ICD-10-PCS | Mod: CPTII,S$GLB,, | Performed by: PHYSICIAN ASSISTANT

## 2023-06-09 PROCEDURE — 99999 PR PBB SHADOW E&M-EST. PATIENT-LVL IV: ICD-10-PCS | Mod: PBBFAC,,, | Performed by: PHYSICIAN ASSISTANT

## 2023-06-09 PROCEDURE — 1101F PR PT FALLS ASSESS DOC 0-1 FALLS W/OUT INJ PAST YR: ICD-10-PCS | Mod: CPTII,S$GLB,, | Performed by: PHYSICIAN ASSISTANT

## 2023-06-09 PROCEDURE — 3288F FALL RISK ASSESSMENT DOCD: CPT | Mod: CPTII,S$GLB,, | Performed by: PHYSICIAN ASSISTANT

## 2023-06-09 PROCEDURE — 1126F AMNT PAIN NOTED NONE PRSNT: CPT | Mod: CPTII,S$GLB,, | Performed by: PHYSICIAN ASSISTANT

## 2023-06-09 PROCEDURE — 99214 PR OFFICE/OUTPT VISIT, EST, LEVL IV, 30-39 MIN: ICD-10-PCS | Mod: S$GLB,,, | Performed by: PHYSICIAN ASSISTANT

## 2023-06-09 PROCEDURE — 3288F PR FALLS RISK ASSESSMENT DOCUMENTED: ICD-10-PCS | Mod: CPTII,S$GLB,, | Performed by: PHYSICIAN ASSISTANT

## 2023-06-09 PROCEDURE — 99999 PR PBB SHADOW E&M-EST. PATIENT-LVL IV: CPT | Mod: PBBFAC,,, | Performed by: PHYSICIAN ASSISTANT

## 2023-06-09 PROCEDURE — 3078F PR MOST RECENT DIASTOLIC BLOOD PRESSURE < 80 MM HG: ICD-10-PCS | Mod: CPTII,S$GLB,, | Performed by: PHYSICIAN ASSISTANT

## 2023-06-09 PROCEDURE — 1101F PT FALLS ASSESS-DOCD LE1/YR: CPT | Mod: CPTII,S$GLB,, | Performed by: PHYSICIAN ASSISTANT

## 2023-06-09 PROCEDURE — 99214 OFFICE O/P EST MOD 30 MIN: CPT | Mod: S$GLB,,, | Performed by: PHYSICIAN ASSISTANT

## 2023-06-09 NOTE — PROGRESS NOTES
"Subjective:      Patient ID: Maryjane Mcintyre is a 72 y.o. female.    Chief Complaint: Follow-up    Patient is known to me, being seen today for follow up.     HTN- labetalol 300mg bid, losartan 100mg, amlodipine 10mg, HCTZ 12.5mg   HLD- on statin therapy   DM- A1c 8.1%, metformin 1000mg bid, lantus 8 units, novolin sliding scale with meal time (but no one is able to check and administer insulin)  Previously on trijardy- this was d/c in January when hospitalized as it wasn't available inpatient, when she went to rehab they just continued the same regimen   At night sugar in 120s     Labs recently completed, diabetes worsened slightly, otherwise stable     Present with daughter     Last visit March 2023 with Sonya Lantigua PA-C.    Review of Systems   Constitutional:  Negative for chills, diaphoresis and fever.   HENT:  Negative for congestion, rhinorrhea and sore throat.    Respiratory:  Negative for cough, shortness of breath and wheezing.    Cardiovascular:  Positive for leg swelling (chronic, stable). Negative for chest pain.   Gastrointestinal:  Negative for abdominal pain, constipation, diarrhea, nausea and vomiting.   Skin:  Negative for rash.   Neurological:  Negative for dizziness, light-headedness and headaches.     Objective:   /70   Pulse 80   Temp 97.7 °F (36.5 °C)   Resp 18   Ht 5' 4" (1.626 m)   Wt 69.2 kg (152 lb 8.9 oz)   SpO2 97%   BMI 26.19 kg/m²   Physical Exam  Constitutional:       General: She is not in acute distress.     Appearance: Normal appearance. She is well-developed. She is not ill-appearing.   HENT:      Head: Normocephalic and atraumatic.   Cardiovascular:      Rate and Rhythm: Normal rate and regular rhythm.      Heart sounds: Normal heart sounds. No murmur heard.  Pulmonary:      Effort: Pulmonary effort is normal. No respiratory distress.      Breath sounds: Normal breath sounds. No decreased breath sounds.   Musculoskeletal:      Right lower leg: Swelling (mild) " present. 1+ Edema present.      Left lower leg: Swelling (mild) present. 1+ Edema present.   Skin:     General: Skin is warm and dry.      Findings: No rash.   Psychiatric:         Speech: Speech normal.         Behavior: Behavior normal.         Thought Content: Thought content normal.     Assessment:      1. Hypertension associated with diabetes    2. Hyperlipidemia associated with type 2 diabetes mellitus    3. Type 2 diabetes mellitus with other circulatory complication, with long-term current use of insulin    4. Asymptomatic bilateral carotid artery stenosis       Plan:   Hypertension associated with diabetes   Controlled     Hyperlipidemia associated with type 2 diabetes mellitus   Continue statin     Type 2 diabetes mellitus with other circulatory complication, with long-term current use of insulin  -     Microalbumin/Creatinine Ratio, Urine; Future; Expected date: 06/09/2023  -     empagliflozin (JARDIANCE) 10 mg tablet; Take 1 tablet (10 mg total) by mouth once daily.  Dispense: 30 tablet; Refill: 1    Asymptomatic bilateral carotid artery stenosis   Followed by Card     D/c novolin ss  Add on jardiance 10mg, ensure adequate hydration   Monitor sugar closely, if any low readings let us know, consider d/c lantus if low readings or NV based on sugar log   6wk f/u, bring sugar log to visit   Consider increasing jardiance in future and holding insulin     3mth f/u PCP

## 2023-06-14 ENCOUNTER — EXTERNAL HOME HEALTH (OUTPATIENT)
Dept: HOME HEALTH SERVICES | Facility: HOSPITAL | Age: 73
End: 2023-06-14
Payer: MEDICARE

## 2023-06-19 PROBLEM — Z86.73 CHRONIC CEREBROVASCULAR ACCIDENT (CVA): Status: RESOLVED | Noted: 2023-02-10 | Resolved: 2023-06-19

## 2023-07-06 DIAGNOSIS — I10 HYPERTENSION GOAL BP (BLOOD PRESSURE) < 130/80: ICD-10-CM

## 2023-07-07 RX ORDER — LABETALOL 200 MG/1
TABLET, FILM COATED ORAL
Qty: 180 TABLET | Refills: 1 | OUTPATIENT
Start: 2023-07-07

## 2023-07-07 NOTE — TELEPHONE ENCOUNTER
No care due was identified.  [facility] Embedded Care Due Messages. Reference number: [id number].   7/06/2023 8:22:01 PM CDT

## 2023-07-07 NOTE — TELEPHONE ENCOUNTER
Refill Decision Note   Maryjane Mcintyre  is requesting a refill authorization.  Brief Assessment and Rationale for Refill:  Quick Discontinue     Medication Therapy Plan:  Pt is now on 300 mg    Medication Reconciliation Completed: No   Comments:     No Care Gaps recommended.     Note composed:6:54 AM 07/07/2023

## 2023-07-12 ENCOUNTER — PATIENT MESSAGE (OUTPATIENT)
Dept: INTERNAL MEDICINE | Facility: CLINIC | Age: 73
End: 2023-07-12
Payer: MEDICARE

## 2023-07-14 ENCOUNTER — LAB VISIT (OUTPATIENT)
Dept: LAB | Facility: HOSPITAL | Age: 73
End: 2023-07-14
Attending: PHYSICIAN ASSISTANT
Payer: MEDICARE

## 2023-07-14 DIAGNOSIS — E11.59 TYPE 2 DIABETES MELLITUS WITH OTHER CIRCULATORY COMPLICATION, WITH LONG-TERM CURRENT USE OF INSULIN: ICD-10-CM

## 2023-07-14 DIAGNOSIS — Z79.4 TYPE 2 DIABETES MELLITUS WITH OTHER CIRCULATORY COMPLICATION, WITH LONG-TERM CURRENT USE OF INSULIN: ICD-10-CM

## 2023-07-14 LAB
ALBUMIN/CREAT UR: NORMAL UG/MG (ref 0–30)
CREAT UR-MCNC: 57 MG/DL (ref 15–325)
MICROALBUMIN UR DL<=1MG/L-MCNC: <5 UG/ML

## 2023-07-14 PROCEDURE — 82570 ASSAY OF URINE CREATININE: CPT | Performed by: PHYSICIAN ASSISTANT

## 2023-07-28 ENCOUNTER — LAB VISIT (OUTPATIENT)
Dept: LAB | Facility: HOSPITAL | Age: 73
End: 2023-07-28
Attending: PHYSICIAN ASSISTANT
Payer: MEDICARE

## 2023-07-28 ENCOUNTER — OFFICE VISIT (OUTPATIENT)
Dept: INTERNAL MEDICINE | Facility: CLINIC | Age: 73
End: 2023-07-28
Payer: MEDICARE

## 2023-07-28 VITALS
SYSTOLIC BLOOD PRESSURE: 152 MMHG | HEART RATE: 85 BPM | HEIGHT: 64 IN | TEMPERATURE: 97 F | DIASTOLIC BLOOD PRESSURE: 70 MMHG | OXYGEN SATURATION: 97 % | WEIGHT: 148.81 LBS | RESPIRATION RATE: 18 BRPM | BODY MASS INDEX: 25.41 KG/M2

## 2023-07-28 DIAGNOSIS — Z79.4 TYPE 2 DIABETES MELLITUS WITH OTHER CIRCULATORY COMPLICATION, WITH LONG-TERM CURRENT USE OF INSULIN: Primary | ICD-10-CM

## 2023-07-28 DIAGNOSIS — Z79.4 TYPE 2 DIABETES MELLITUS WITH OTHER CIRCULATORY COMPLICATION, WITH LONG-TERM CURRENT USE OF INSULIN: ICD-10-CM

## 2023-07-28 DIAGNOSIS — E11.59 TYPE 2 DIABETES MELLITUS WITH OTHER CIRCULATORY COMPLICATION, WITH LONG-TERM CURRENT USE OF INSULIN: Primary | ICD-10-CM

## 2023-07-28 DIAGNOSIS — I15.2 HYPERTENSION ASSOCIATED WITH DIABETES: ICD-10-CM

## 2023-07-28 DIAGNOSIS — E11.59 TYPE 2 DIABETES MELLITUS WITH OTHER CIRCULATORY COMPLICATION, WITH LONG-TERM CURRENT USE OF INSULIN: ICD-10-CM

## 2023-07-28 DIAGNOSIS — E11.59 HYPERTENSION ASSOCIATED WITH DIABETES: ICD-10-CM

## 2023-07-28 LAB
ANION GAP SERPL CALC-SCNC: 12 MMOL/L (ref 8–16)
BUN SERPL-MCNC: 13 MG/DL (ref 8–23)
CALCIUM SERPL-MCNC: 9.7 MG/DL (ref 8.7–10.5)
CHLORIDE SERPL-SCNC: 109 MMOL/L (ref 95–110)
CO2 SERPL-SCNC: 21 MMOL/L (ref 23–29)
CREAT SERPL-MCNC: 0.8 MG/DL (ref 0.5–1.4)
EST. GFR  (NO RACE VARIABLE): >60 ML/MIN/1.73 M^2
GLUCOSE SERPL-MCNC: 208 MG/DL (ref 70–110)
POTASSIUM SERPL-SCNC: 3.1 MMOL/L (ref 3.5–5.1)
SODIUM SERPL-SCNC: 142 MMOL/L (ref 136–145)

## 2023-07-28 PROCEDURE — 3061F NEG MICROALBUMINURIA REV: CPT | Mod: CPTII,S$GLB,, | Performed by: PHYSICIAN ASSISTANT

## 2023-07-28 PROCEDURE — 3052F PR MOST RECENT HEMOGLOBIN A1C LEVEL 8.0 - < 9.0%: ICD-10-PCS | Mod: CPTII,S$GLB,, | Performed by: PHYSICIAN ASSISTANT

## 2023-07-28 PROCEDURE — 3052F HG A1C>EQUAL 8.0%<EQUAL 9.0%: CPT | Mod: CPTII,S$GLB,, | Performed by: PHYSICIAN ASSISTANT

## 2023-07-28 PROCEDURE — 3077F SYST BP >= 140 MM HG: CPT | Mod: CPTII,S$GLB,, | Performed by: PHYSICIAN ASSISTANT

## 2023-07-28 PROCEDURE — 1126F PR PAIN SEVERITY QUANTIFIED, NO PAIN PRESENT: ICD-10-PCS | Mod: CPTII,S$GLB,, | Performed by: PHYSICIAN ASSISTANT

## 2023-07-28 PROCEDURE — 80048 BASIC METABOLIC PNL TOTAL CA: CPT | Performed by: PHYSICIAN ASSISTANT

## 2023-07-28 PROCEDURE — 99214 PR OFFICE/OUTPT VISIT, EST, LEVL IV, 30-39 MIN: ICD-10-PCS | Mod: S$GLB,,, | Performed by: PHYSICIAN ASSISTANT

## 2023-07-28 PROCEDURE — 3066F NEPHROPATHY DOC TX: CPT | Mod: CPTII,S$GLB,, | Performed by: PHYSICIAN ASSISTANT

## 2023-07-28 PROCEDURE — 4010F PR ACE/ARB THEARPY RXD/TAKEN: ICD-10-PCS | Mod: CPTII,S$GLB,, | Performed by: PHYSICIAN ASSISTANT

## 2023-07-28 PROCEDURE — 3066F PR DOCUMENTATION OF TREATMENT FOR NEPHROPATHY: ICD-10-PCS | Mod: CPTII,S$GLB,, | Performed by: PHYSICIAN ASSISTANT

## 2023-07-28 PROCEDURE — 3078F PR MOST RECENT DIASTOLIC BLOOD PRESSURE < 80 MM HG: ICD-10-PCS | Mod: CPTII,S$GLB,, | Performed by: PHYSICIAN ASSISTANT

## 2023-07-28 PROCEDURE — 1126F AMNT PAIN NOTED NONE PRSNT: CPT | Mod: CPTII,S$GLB,, | Performed by: PHYSICIAN ASSISTANT

## 2023-07-28 PROCEDURE — 99999 PR PBB SHADOW E&M-EST. PATIENT-LVL IV: CPT | Mod: PBBFAC,,, | Performed by: PHYSICIAN ASSISTANT

## 2023-07-28 PROCEDURE — 3008F BODY MASS INDEX DOCD: CPT | Mod: CPTII,S$GLB,, | Performed by: PHYSICIAN ASSISTANT

## 2023-07-28 PROCEDURE — 1160F PR REVIEW ALL MEDS BY PRESCRIBER/CLIN PHARMACIST DOCUMENTED: ICD-10-PCS | Mod: CPTII,S$GLB,, | Performed by: PHYSICIAN ASSISTANT

## 2023-07-28 PROCEDURE — 3077F PR MOST RECENT SYSTOLIC BLOOD PRESSURE >= 140 MM HG: ICD-10-PCS | Mod: CPTII,S$GLB,, | Performed by: PHYSICIAN ASSISTANT

## 2023-07-28 PROCEDURE — 99214 OFFICE O/P EST MOD 30 MIN: CPT | Mod: S$GLB,,, | Performed by: PHYSICIAN ASSISTANT

## 2023-07-28 PROCEDURE — 4010F ACE/ARB THERAPY RXD/TAKEN: CPT | Mod: CPTII,S$GLB,, | Performed by: PHYSICIAN ASSISTANT

## 2023-07-28 PROCEDURE — 1160F RVW MEDS BY RX/DR IN RCRD: CPT | Mod: CPTII,S$GLB,, | Performed by: PHYSICIAN ASSISTANT

## 2023-07-28 PROCEDURE — 36415 COLL VENOUS BLD VENIPUNCTURE: CPT | Performed by: PHYSICIAN ASSISTANT

## 2023-07-28 PROCEDURE — 1159F PR MEDICATION LIST DOCUMENTED IN MEDICAL RECORD: ICD-10-PCS | Mod: CPTII,S$GLB,, | Performed by: PHYSICIAN ASSISTANT

## 2023-07-28 PROCEDURE — 3078F DIAST BP <80 MM HG: CPT | Mod: CPTII,S$GLB,, | Performed by: PHYSICIAN ASSISTANT

## 2023-07-28 PROCEDURE — 3061F PR NEG MICROALBUMINURIA RESULT DOCUMENTED/REVIEW: ICD-10-PCS | Mod: CPTII,S$GLB,, | Performed by: PHYSICIAN ASSISTANT

## 2023-07-28 PROCEDURE — 1159F MED LIST DOCD IN RCRD: CPT | Mod: CPTII,S$GLB,, | Performed by: PHYSICIAN ASSISTANT

## 2023-07-28 PROCEDURE — 3008F PR BODY MASS INDEX (BMI) DOCUMENTED: ICD-10-PCS | Mod: CPTII,S$GLB,, | Performed by: PHYSICIAN ASSISTANT

## 2023-07-28 PROCEDURE — 99999 PR PBB SHADOW E&M-EST. PATIENT-LVL IV: ICD-10-PCS | Mod: PBBFAC,,, | Performed by: PHYSICIAN ASSISTANT

## 2023-07-28 RX ORDER — METFORMIN HYDROCHLORIDE 500 MG/1
1000 TABLET, EXTENDED RELEASE ORAL 2 TIMES DAILY WITH MEALS
Qty: 360 TABLET | Refills: 1 | Status: SHIPPED | OUTPATIENT
Start: 2023-07-28 | End: 2023-09-21 | Stop reason: ALTCHOICE

## 2023-07-28 NOTE — PROGRESS NOTES
"Subjective:      Patient ID: Maryjane Mcintyre is a 73 y.o. female.    Chief Complaint: 6 Week Follow-Up    Patient is known to me, being seen today for follow up.     HTN- labetalol 300mg bid, losartan 100mg, amlodipine 10mg, HCTZ 12.5mg   Did not take medication this morning   DM- A1c 8.1%, metformin 1000mg bid, lantus 8 units, jardiance 10mg  [facility] during the [facility]   Blood sugar varies between 160-180   Reports chronic diarrhea attributes to metformin     Last visit June 2023 with myself.    Review of Systems   Constitutional:  Negative for chills, diaphoresis and fever.   HENT:  Negative for congestion, rhinorrhea and sore throat.    Respiratory:  Negative for cough, shortness of breath and wheezing.    Cardiovascular:  Negative for chest pain and leg swelling.   Gastrointestinal:  Negative for abdominal pain, constipation, diarrhea, nausea and vomiting.   Skin:  Negative for rash.   Neurological:  Negative for dizziness, light-headedness and headaches.     Objective:   BP (!) 152/70   Pulse 85   Temp 97.2 °F (36.2 °C)   Resp 18   Ht 5' 4" (1.626 m)   Wt 67.5 kg (148 lb 13 oz)   SpO2 97%   BMI 25.54 kg/m²   Physical Exam  Constitutional:       General: She is not in acute distress.     Appearance: Normal appearance. She is well-developed. She is not ill-appearing.   HENT:      Head: Normocephalic and atraumatic.   Cardiovascular:      Rate and Rhythm: Normal rate and regular rhythm.      Heart sounds: Normal heart sounds. No murmur heard.  Pulmonary:      Effort: Pulmonary effort is normal. No respiratory distress.      Breath sounds: Normal breath sounds. No decreased breath sounds.   Musculoskeletal:      Right lower leg: No edema.      Left lower leg: No edema.   Skin:     General: Skin is warm and dry.      Findings: No rash.   Psychiatric:         Speech: Speech normal.         Behavior: Behavior normal.         Thought Content: Thought content normal. "     Assessment:      1. Type 2 diabetes mellitus with other circulatory complication, with long-term current use of insulin    2. Hypertension associated with diabetes       Plan:   Type 2 diabetes mellitus with other circulatory complication, with long-term current use of insulin  -     metFORMIN (GLUCOPHAGE-XR) 500 MG ER 24hr tablet; Take 2 tablets (1,000 mg total) by mouth 2 (two) times daily with meals.  Dispense: 360 tablet; Refill: 1  -     empagliflozin (JARDIANCE) 25 mg tablet; Take 1 tablet (25 mg total) by mouth once daily.  Dispense: 90 tablet; Refill: 1  -     Basic Metabolic Panel; Future; Expected date: 07/28/2023    Hypertension associated with diabetes      BMP today     Increase jardiance 25mg, ensure adequate hydration  Switch from metformin 1000mg to metformin 1000mg (500mg XR to avoid GI upset, reports diarrhea was much better on XR version)  Continue lantus 8units     Monitor BP at home   Keep f/u with PCP in September as scheduled, will be due for labs at that time   If A1c without improvement in Sept, consider refer to Diabetes Mgmt     Discussed worsening signs/symptoms and when to return to clinic or go to ED.   Patient expresses understanding and agrees with treatment plan.

## 2023-07-31 DIAGNOSIS — E87.6 HYPOKALEMIA: Primary | ICD-10-CM

## 2023-08-01 ENCOUNTER — TELEPHONE (OUTPATIENT)
Dept: INTERNAL MEDICINE | Facility: CLINIC | Age: 73
End: 2023-08-01
Payer: MEDICARE

## 2023-08-03 ENCOUNTER — TELEPHONE (OUTPATIENT)
Dept: INTERNAL MEDICINE | Facility: CLINIC | Age: 73
End: 2023-08-03
Payer: MEDICARE

## 2023-08-03 NOTE — OR NURSING
Bed repositioned. Will begin colonoscopy.      Libtayo Counseling- I discussed with the patient the risks of Libtayo including but not limited to nausea, vomiting, diarrhea, and bone or muscle pain.  The patient verbalized understanding of the proper use and possible adverse effects of Libtayo.  All of the patient's questions and concerns were addressed.

## 2023-08-03 NOTE — TELEPHONE ENCOUNTER
Returned call to patient spoke with daughter discussed Metformin Rx, patient voiced understanding and will follow up as needed.

## 2023-09-11 ENCOUNTER — PATIENT OUTREACH (OUTPATIENT)
Dept: ADMINISTRATIVE | Facility: HOSPITAL | Age: 73
End: 2023-09-11
Payer: MEDICARE

## 2023-09-11 NOTE — PROGRESS NOTES
DM lab report: Per chart review, patient has a lab appointment on 9/15/23 for recheck of diabetic labs.

## 2023-09-15 ENCOUNTER — LAB VISIT (OUTPATIENT)
Dept: LAB | Facility: HOSPITAL | Age: 73
End: 2023-09-15
Payer: MEDICARE

## 2023-09-15 ENCOUNTER — OFFICE VISIT (OUTPATIENT)
Dept: PODIATRY | Facility: CLINIC | Age: 73
End: 2023-09-15
Payer: MEDICARE

## 2023-09-15 VITALS — WEIGHT: 148.81 LBS | BODY MASS INDEX: 25.41 KG/M2 | HEIGHT: 64 IN

## 2023-09-15 DIAGNOSIS — I10 HYPERTENSION GOAL BP (BLOOD PRESSURE) < 130/80: ICD-10-CM

## 2023-09-15 DIAGNOSIS — E11.51 CONTROLLED TYPE 2 DM WITH PERIPHERAL CIRCULATORY DISORDER: Primary | ICD-10-CM

## 2023-09-15 DIAGNOSIS — E78.5 HYPERLIPIDEMIA LDL GOAL <70: ICD-10-CM

## 2023-09-15 DIAGNOSIS — E11.65 UNCONTROLLED TYPE 2 DIABETES MELLITUS WITH HYPERGLYCEMIA, WITH LONG-TERM CURRENT USE OF INSULIN: ICD-10-CM

## 2023-09-15 DIAGNOSIS — Z79.4 UNCONTROLLED TYPE 2 DIABETES MELLITUS WITH HYPERGLYCEMIA, WITH LONG-TERM CURRENT USE OF INSULIN: ICD-10-CM

## 2023-09-15 DIAGNOSIS — B35.1 DERMATOPHYTOSIS OF NAIL: ICD-10-CM

## 2023-09-15 DIAGNOSIS — I73.9 PAD (PERIPHERAL ARTERY DISEASE): ICD-10-CM

## 2023-09-15 LAB
ALBUMIN SERPL BCP-MCNC: 4.1 G/DL (ref 3.5–5.2)
ALP SERPL-CCNC: 39 U/L (ref 55–135)
ALT SERPL W/O P-5'-P-CCNC: 13 U/L (ref 10–44)
ANION GAP SERPL CALC-SCNC: 13 MMOL/L (ref 8–16)
AST SERPL-CCNC: 11 U/L (ref 10–40)
BILIRUB SERPL-MCNC: 0.5 MG/DL (ref 0.1–1)
BUN SERPL-MCNC: 13 MG/DL (ref 8–23)
CALCIUM SERPL-MCNC: 9.9 MG/DL (ref 8.7–10.5)
CHLORIDE SERPL-SCNC: 109 MMOL/L (ref 95–110)
CHOLEST SERPL-MCNC: 120 MG/DL (ref 120–199)
CHOLEST/HDLC SERPL: 2.9 {RATIO} (ref 2–5)
CO2 SERPL-SCNC: 18 MMOL/L (ref 23–29)
CREAT SERPL-MCNC: 0.8 MG/DL (ref 0.5–1.4)
EST. GFR  (NO RACE VARIABLE): >60 ML/MIN/1.73 M^2
ESTIMATED AVG GLUCOSE: 163 MG/DL (ref 68–131)
GLUCOSE SERPL-MCNC: 131 MG/DL (ref 70–110)
HBA1C MFR BLD: 7.3 % (ref 4–5.6)
HDLC SERPL-MCNC: 42 MG/DL (ref 40–75)
HDLC SERPL: 35 % (ref 20–50)
LDLC SERPL CALC-MCNC: 66.6 MG/DL (ref 63–159)
NONHDLC SERPL-MCNC: 78 MG/DL
POTASSIUM SERPL-SCNC: 3.7 MMOL/L (ref 3.5–5.1)
PROT SERPL-MCNC: 6.8 G/DL (ref 6–8.4)
SODIUM SERPL-SCNC: 140 MMOL/L (ref 136–145)
TRIGL SERPL-MCNC: 57 MG/DL (ref 30–150)

## 2023-09-15 PROCEDURE — 99499 NO LOS: ICD-10-PCS | Mod: S$GLB,,, | Performed by: PODIATRIST

## 2023-09-15 PROCEDURE — 99999 PR PBB SHADOW E&M-EST. PATIENT-LVL III: CPT | Mod: PBBFAC,,, | Performed by: PODIATRIST

## 2023-09-15 PROCEDURE — 11721 DEBRIDE NAIL 6 OR MORE: CPT | Mod: Q8,S$GLB,, | Performed by: PODIATRIST

## 2023-09-15 PROCEDURE — 83036 HEMOGLOBIN GLYCOSYLATED A1C: CPT | Performed by: INTERNAL MEDICINE

## 2023-09-15 PROCEDURE — 99499 UNLISTED E&M SERVICE: CPT | Mod: S$GLB,,, | Performed by: PODIATRIST

## 2023-09-15 PROCEDURE — 11721 PR DEBRIDEMENT OF NAILS, 6 OR MORE: ICD-10-PCS | Mod: Q8,S$GLB,, | Performed by: PODIATRIST

## 2023-09-15 PROCEDURE — 99999 PR PBB SHADOW E&M-EST. PATIENT-LVL III: ICD-10-PCS | Mod: PBBFAC,,, | Performed by: PODIATRIST

## 2023-09-15 PROCEDURE — 80053 COMPREHEN METABOLIC PANEL: CPT | Performed by: INTERNAL MEDICINE

## 2023-09-15 PROCEDURE — 36415 COLL VENOUS BLD VENIPUNCTURE: CPT | Performed by: INTERNAL MEDICINE

## 2023-09-15 PROCEDURE — 80061 LIPID PANEL: CPT | Performed by: INTERNAL MEDICINE

## 2023-09-15 NOTE — PROGRESS NOTES
Subjective:     Patient ID: Maryjane Mcintyre is a 73 y.o. female.    Chief Complaint: Nail Care (Nail Care (Diabetic pt wears casual shoes, PCP Dr. Dorado last seen 06-14-23))    Maryjane is a 73 y.o. female who presents to the clinic for evaluation and treatment of high risk feet. Maryjane has a past medical history of Arthritis, Diabetes mellitus, type 2, GERD (gastroesophageal reflux disease), Gout, Hyperlipidemia, Hypertension, and Onychomycosis. The patient's chief complaint is long, thick toenails. This patient has documented high risk feet requiring routine maintenance secondary to diabetes mellitis and those secondary complications of diabetes, as mentioned..    PCP: Angelita Dorado DO    Date Last Seen by PCP: 06/14/2023    Current shoe gear:  Affected Foot: Tennis shoes     Unaffected Foot: Tennis shoes    Hemoglobin A1C   Date Value Ref Range Status   05/24/2023 8.1 (H) 4.0 - 5.6 % Final     Comment:     ADA Screening Guidelines:  5.7-6.4%  Consistent with prediabetes  >or=6.5%  Consistent with diabetes    High levels of fetal hemoglobin interfere with the HbA1C  assay. Heterozygous hemoglobin variants (HbS, HgC, etc)do  not significantly interfere with this assay.   However, presence of multiple variants may affect accuracy.     11/21/2022 7.7 (H) 4.0 - 5.6 % Final     Comment:     ADA Screening Guidelines:  5.7-6.4%  Consistent with prediabetes  >or=6.5%  Consistent with diabetes    High levels of fetal hemoglobin interfere with the HbA1C  assay. Heterozygous hemoglobin variants (HbS, HgC, etc)do  not significantly interfere with this assay.   However, presence of multiple variants may affect accuracy.     08/04/2022 8.3 (H) 4.0 - 5.6 % Final     Comment:     ADA Screening Guidelines:  5.7-6.4%  Consistent with prediabetes  >or=6.5%  Consistent with diabetes    High levels of fetal hemoglobin interfere with the HbA1C  assay. Heterozygous hemoglobin variants (HbS, HgC, etc)do  not significantly interfere with  this assay.   However, presence of multiple variants may affect accuracy.         Patient Active Problem List   Diagnosis    Type 2 diabetes mellitus with circulatory disorder, with long-term current use of insulin    PAD (peripheral artery disease)    Baker cyst, left    Hypertension associated with diabetes    Smoker    Hyperlipidemia associated with type 2 diabetes mellitus    Onychomycosis    Gout    Dysphagia    Osteopenia of multiple sites    Asymptomatic bilateral carotid artery stenosis    Cervical myelopathy    Neck pain       Medication List with Changes/Refills   Current Medications    ACETAMINOPHEN (TYLENOL) 160 MG/5 ML LIQD    Take 100 mg by mouth daily as needed. Take one tablet by mouth once daily as needed    AMLODIPINE (NORVASC) 10 MG TABLET    Take 1 tablet (10 mg total) by mouth once daily.    CELECOXIB (CELEBREX) 200 MG CAPSULE    Take 1 capsule (200 mg total) by mouth once daily.    CHOLECALCIFEROL, VITAMIN D3, 5,000 UNIT CAPSULE    Take 5,000 Units by mouth once daily.    CILOSTAZOL (PLETAL) 50 MG TAB    Take 1 tablet (50 mg total) by mouth 2 (two) times daily.    EMPAGLIFLOZIN (JARDIANCE) 25 MG TABLET    Take 1 tablet (25 mg total) by mouth once daily.    FLUTICASONE PROPIONATE (FLONASE) 50 MCG/ACTUATION NASAL SPRAY    2 sprays both nostrils daily prn    HYDROCHLOROTHIAZIDE (HYDRODIURIL) 12.5 MG TAB    Take 1 tablet (12.5 mg total) by mouth once daily.    INSULIN (LANTUS SOLOSTAR U-100 INSULIN) GLARGINE 100 UNITS/ML SUBQ PEN    Inject 8 Units into the skin every evening.    IPRATROPIUM-ALBUTEROL (COMBIVENT RESPIMAT)  MCG/ACTUATION INHALER    Inhale 1 puff into the lungs every 6 (six) hours as needed for Wheezing. Rescue    LABETALOL (NORMODYNE) 300 MG TABLET    Take 1 tablet (300 mg total) by mouth every 12 (twelve) hours.    LOSARTAN (COZAAR) 100 MG TABLET    Take 1 tablet (100 mg total) by mouth once daily.    METFORMIN (GLUCOPHAGE-XR) 500 MG ER 24HR TABLET    Take 2 tablets (1,000 mg  "total) by mouth 2 (two) times daily with meals.    NOVOLIN R FLEXPEN 100 UNIT/ML (3 ML) INPN PEN    Inject into the skin.    OMEPRAZOLE (PRILOSEC) 20 MG CAPSULE    Take 1 capsule (20 mg total) by mouth once daily.    OXYBUTYNIN (DITROPAN-XL) 5 MG TR24    Take 1 tablet (5 mg total) by mouth once daily.    PEN NEEDLE, DIABETIC (BD MAGDALENA 2ND GEN PEN NEEDLE) 32 GAUGE X 5/32" NDLE    1 each by Other route 2 (two) times a day.    PRAVASTATIN (PRAVACHOL) 80 MG TABLET    Take 1 tablet (80 mg total) by mouth every evening.       Review of patient's allergies indicates:  No Known Allergies    Past Surgical History:   Procedure Laterality Date    Cardiac Echo      COLONOSCOPY N/A 10/19/2020    Procedure: COLONOSCOPY;  Surgeon: Yousuf Fuller MD;  Location: [facility] ENDO;  Service: Endoscopy;  Laterality: N/A;    ESOPHAGOGASTRODUODENOSCOPY N/A 10/19/2020    Procedure: ESOPHAGOGASTRODUODENOSCOPY (EGD);  Surgeon: Yousuf Fuller MD;  Location: [facility] ENDO;  Service: Endoscopy;  Laterality: N/A;    GALLBLADDER SURGERY      HYSTERECTOMY         Family History   Problem Relation Age of Onset    Diabetes Father     Hypertension Father     Heart attack Sister     Colon cancer Sister     Stroke Sister     Colon cancer Brother        Social History     Socioeconomic History    Marital status:    Tobacco Use    Smoking status: Former     Current packs/day: 1.00     Types: Cigarettes    Smokeless tobacco: Never    Tobacco comments:     Quit 2019 mid year   Substance and Sexual Activity    Alcohol use: No    Drug use: Never    Sexual activity: Not Currently       Vitals:    09/15/23 0921   Weight: 67.5 kg (148 lb 13 oz)   Height: 5' 4" (1.626 m)   PainSc: 0-No pain       Hemoglobin A1C   Date Value Ref Range Status   05/24/2023 8.1 (H) 4.0 - 5.6 % Final     Comment:     ADA Screening Guidelines:  5.7-6.4%  Consistent with prediabetes  >or=6.5%  Consistent with diabetes    High levels of fetal hemoglobin interfere with the " HbA1C  assay. Heterozygous hemoglobin variants (HbS, HgC, etc)do  not significantly interfere with this assay.   However, presence of multiple variants may affect accuracy.     11/21/2022 7.7 (H) 4.0 - 5.6 % Final     Comment:     ADA Screening Guidelines:  5.7-6.4%  Consistent with prediabetes  >or=6.5%  Consistent with diabetes    High levels of fetal hemoglobin interfere with the HbA1C  assay. Heterozygous hemoglobin variants (HbS, HgC, etc)do  not significantly interfere with this assay.   However, presence of multiple variants may affect accuracy.     08/04/2022 8.3 (H) 4.0 - 5.6 % Final     Comment:     ADA Screening Guidelines:  5.7-6.4%  Consistent with prediabetes  >or=6.5%  Consistent with diabetes    High levels of fetal hemoglobin interfere with the HbA1C  assay. Heterozygous hemoglobin variants (HbS, HgC, etc)do  not significantly interfere with this assay.   However, presence of multiple variants may affect accuracy.         Review of Systems   Constitutional:  Negative for chills and fever.   Respiratory:  Negative for shortness of breath.    Cardiovascular:  Negative for chest pain, palpitations, orthopnea, claudication and leg swelling.   Gastrointestinal:  Negative for diarrhea, nausea and vomiting.   Musculoskeletal:  Negative for joint pain.   Skin:  Negative for rash.   Neurological:  Negative for dizziness, tingling, sensory change, focal weakness and weakness.   Psychiatric/Behavioral: Negative.               Objective:      PHYSICAL EXAM: Apperance: Alert and orient in no distress,well developed, and with good attention to grooming and body habits  Patient presents ambulating in tennis shoes.   LOWER EXTREMITY EXAM:  VASCULAR: Dorsalis pedis pulses 0/4 bilateral and Posterior Tibial pulses 1/4 bilateral. Capillary fill time <blanched bilateral. Mild edema observed bilateral. Varicosities present bilateral. Skin temperature of the lower extremities is warm to warm, proximal to distal. Hair  growth absent bilateral.  DERMATOLOGICAL: No skin rashes, subcutaneous nodules, lesions, or ulcers observed bilateral. Nails 1,2,3,4,5 bilateral elongated, thickened, and discolored with subungual debris. Webspaces 1,2,3,4 bilateral clean, dry and without evidence of break in skin integrity.   NEUROLOGICAL: Light touch, sharp-dull, proprioception all present and equal bilaterally.  Vibratory sensation diminished. Protective sensation absent sites as tested with a Rochelle-Jones 5.07 monofilament.   MUSCULOSKELETAL: Muscle strength is 5/5 for foot inverters, everters, plantarflexors, and dorsiflexors. Muscle tone is normal.         Assessment:       ICD-10-CM ICD-9-CM   1. Controlled type 2 DM with peripheral circulatory disorder  E11.51 250.70     443.81   2. Dermatophytosis of nail  B35.1 110.1   3. PAD (peripheral artery disease)  I73.9 443.9       Plan:   Controlled type 2 DM with peripheral circulatory disorder    Dermatophytosis of nail    PAD (peripheral artery disease)    I counseled the patient on her conditions, regarding findings of my examination, my impressions, and usual treatment plan.   Appointment spent on education about the diabetic foot, neuropathy, and prevention of limb loss.  Shoe inspection. Diabetic Foot Education. Patient reminded of the importance of good nutrition and blood sugar control to help prevent podiatric complications of diabetes. Patient instructed on proper foot hygeine. We discussed wearing proper shoe gear, daily foot inspections, never walking without protective shoe gear, never putting sharp instruments to feet.    With patient's permission, nails 1-5 bilateral were debrided/trimmed in length and thickness aggressively to their soft tissue attachment mechanically and with electric , removing all offending nail and debris. Patient relates relief following the procedure.  Patient  will continue to monitor the areas daily, inspect feet, wear protective shoe gear when  ambulatory, moisturizer to maintain skin integrity. Patient reminded of the importance of good nutrition and blood sugar control to help prevent podiatric complications of diabetes.  Patient to return 4 months or sooner if needed.              Jacek Dooley DPM  [facility]

## 2023-09-21 ENCOUNTER — OFFICE VISIT (OUTPATIENT)
Dept: INTERNAL MEDICINE | Facility: CLINIC | Age: 73
End: 2023-09-21
Payer: MEDICARE

## 2023-09-21 VITALS
BODY MASS INDEX: 25.9 KG/M2 | RESPIRATION RATE: 20 BRPM | HEART RATE: 84 BPM | SYSTOLIC BLOOD PRESSURE: 130 MMHG | OXYGEN SATURATION: 97 % | WEIGHT: 151.69 LBS | HEIGHT: 64 IN | DIASTOLIC BLOOD PRESSURE: 70 MMHG | TEMPERATURE: 98 F

## 2023-09-21 DIAGNOSIS — I10 HYPERTENSION GOAL BP (BLOOD PRESSURE) < 130/80: ICD-10-CM

## 2023-09-21 DIAGNOSIS — E11.65 UNCONTROLLED TYPE 2 DIABETES MELLITUS WITH HYPERGLYCEMIA, WITH LONG-TERM CURRENT USE OF INSULIN: ICD-10-CM

## 2023-09-21 DIAGNOSIS — I73.9 PAD (PERIPHERAL ARTERY DISEASE): ICD-10-CM

## 2023-09-21 DIAGNOSIS — Z00.00 ANNUAL PHYSICAL EXAM: Primary | ICD-10-CM

## 2023-09-21 DIAGNOSIS — Z79.4 UNCONTROLLED TYPE 2 DIABETES MELLITUS WITH HYPERGLYCEMIA, WITH LONG-TERM CURRENT USE OF INSULIN: ICD-10-CM

## 2023-09-21 DIAGNOSIS — E78.5 HYPERLIPIDEMIA LDL GOAL <70: ICD-10-CM

## 2023-09-21 PROCEDURE — 3008F PR BODY MASS INDEX (BMI) DOCUMENTED: ICD-10-PCS | Mod: CPTII,S$GLB,, | Performed by: INTERNAL MEDICINE

## 2023-09-21 PROCEDURE — 1101F PR PT FALLS ASSESS DOC 0-1 FALLS W/OUT INJ PAST YR: ICD-10-PCS | Mod: CPTII,S$GLB,, | Performed by: INTERNAL MEDICINE

## 2023-09-21 PROCEDURE — 3051F HG A1C>EQUAL 7.0%<8.0%: CPT | Mod: CPTII,S$GLB,, | Performed by: INTERNAL MEDICINE

## 2023-09-21 PROCEDURE — 3075F SYST BP GE 130 - 139MM HG: CPT | Mod: CPTII,S$GLB,, | Performed by: INTERNAL MEDICINE

## 2023-09-21 PROCEDURE — 3078F DIAST BP <80 MM HG: CPT | Mod: CPTII,S$GLB,, | Performed by: INTERNAL MEDICINE

## 2023-09-21 PROCEDURE — 3078F PR MOST RECENT DIASTOLIC BLOOD PRESSURE < 80 MM HG: ICD-10-PCS | Mod: CPTII,S$GLB,, | Performed by: INTERNAL MEDICINE

## 2023-09-21 PROCEDURE — 3051F PR MOST RECENT HEMOGLOBIN A1C LEVEL 7.0 - < 8.0%: ICD-10-PCS | Mod: CPTII,S$GLB,, | Performed by: INTERNAL MEDICINE

## 2023-09-21 PROCEDURE — 1160F PR REVIEW ALL MEDS BY PRESCRIBER/CLIN PHARMACIST DOCUMENTED: ICD-10-PCS | Mod: CPTII,S$GLB,, | Performed by: INTERNAL MEDICINE

## 2023-09-21 PROCEDURE — 99999 PR PBB SHADOW E&M-EST. PATIENT-LVL V: CPT | Mod: PBBFAC,,, | Performed by: INTERNAL MEDICINE

## 2023-09-21 PROCEDURE — 3288F PR FALLS RISK ASSESSMENT DOCUMENTED: ICD-10-PCS | Mod: CPTII,S$GLB,, | Performed by: INTERNAL MEDICINE

## 2023-09-21 PROCEDURE — 4010F ACE/ARB THERAPY RXD/TAKEN: CPT | Mod: CPTII,S$GLB,, | Performed by: INTERNAL MEDICINE

## 2023-09-21 PROCEDURE — 3075F PR MOST RECENT SYSTOLIC BLOOD PRESS GE 130-139MM HG: ICD-10-PCS | Mod: CPTII,S$GLB,, | Performed by: INTERNAL MEDICINE

## 2023-09-21 PROCEDURE — 3008F BODY MASS INDEX DOCD: CPT | Mod: CPTII,S$GLB,, | Performed by: INTERNAL MEDICINE

## 2023-09-21 PROCEDURE — 99999 PR PBB SHADOW E&M-EST. PATIENT-LVL V: ICD-10-PCS | Mod: PBBFAC,,, | Performed by: INTERNAL MEDICINE

## 2023-09-21 PROCEDURE — 3066F PR DOCUMENTATION OF TREATMENT FOR NEPHROPATHY: ICD-10-PCS | Mod: CPTII,S$GLB,, | Performed by: INTERNAL MEDICINE

## 2023-09-21 PROCEDURE — 3061F NEG MICROALBUMINURIA REV: CPT | Mod: CPTII,S$GLB,, | Performed by: INTERNAL MEDICINE

## 2023-09-21 PROCEDURE — 1126F AMNT PAIN NOTED NONE PRSNT: CPT | Mod: CPTII,S$GLB,, | Performed by: INTERNAL MEDICINE

## 2023-09-21 PROCEDURE — 99214 PR OFFICE/OUTPT VISIT, EST, LEVL IV, 30-39 MIN: ICD-10-PCS | Mod: S$GLB,,, | Performed by: INTERNAL MEDICINE

## 2023-09-21 PROCEDURE — 3066F NEPHROPATHY DOC TX: CPT | Mod: CPTII,S$GLB,, | Performed by: INTERNAL MEDICINE

## 2023-09-21 PROCEDURE — 3288F FALL RISK ASSESSMENT DOCD: CPT | Mod: CPTII,S$GLB,, | Performed by: INTERNAL MEDICINE

## 2023-09-21 PROCEDURE — 1160F RVW MEDS BY RX/DR IN RCRD: CPT | Mod: CPTII,S$GLB,, | Performed by: INTERNAL MEDICINE

## 2023-09-21 PROCEDURE — 1101F PT FALLS ASSESS-DOCD LE1/YR: CPT | Mod: CPTII,S$GLB,, | Performed by: INTERNAL MEDICINE

## 2023-09-21 PROCEDURE — 1126F PR PAIN SEVERITY QUANTIFIED, NO PAIN PRESENT: ICD-10-PCS | Mod: CPTII,S$GLB,, | Performed by: INTERNAL MEDICINE

## 2023-09-21 PROCEDURE — 3061F PR NEG MICROALBUMINURIA RESULT DOCUMENTED/REVIEW: ICD-10-PCS | Mod: CPTII,S$GLB,, | Performed by: INTERNAL MEDICINE

## 2023-09-21 PROCEDURE — 99214 OFFICE O/P EST MOD 30 MIN: CPT | Mod: S$GLB,,, | Performed by: INTERNAL MEDICINE

## 2023-09-21 PROCEDURE — 1159F MED LIST DOCD IN RCRD: CPT | Mod: CPTII,S$GLB,, | Performed by: INTERNAL MEDICINE

## 2023-09-21 PROCEDURE — 1159F PR MEDICATION LIST DOCUMENTED IN MEDICAL RECORD: ICD-10-PCS | Mod: CPTII,S$GLB,, | Performed by: INTERNAL MEDICINE

## 2023-09-21 PROCEDURE — 4010F PR ACE/ARB THEARPY RXD/TAKEN: ICD-10-PCS | Mod: CPTII,S$GLB,, | Performed by: INTERNAL MEDICINE

## 2023-09-21 RX ORDER — EMPAGLIFLOZIN, LINAGLIPTIN, METFORMIN HYDROCHLORIDE 10; 5; 1000 MG/1; MG/1; MG/1
1 TABLET, EXTENDED RELEASE ORAL DAILY
Qty: 90 TABLET | Refills: 1 | Status: SHIPPED | OUTPATIENT
Start: 2023-09-21 | End: 2024-03-11 | Stop reason: ALTCHOICE

## 2023-09-21 NOTE — PROGRESS NOTES
Maryjane Mcintyre  73 y.o. Black or  female  [id number]    Chief Complaint:  Chief Complaint   Patient presents with    Annual Exam       History of Present Illness:  Presents for an annual exam. She is here with a family member.   HTN--elevated in the clinic but she reports good readings at home. She is taking medication as prescribed.   DM--improved. She is taking metformin, Jardiance and Lantus but having a lot of diarrhea with metformin. She states when she was on Trijardy in the past she did not have any problems and would like to get back on it.   HLD--compliant with pravastatin.   PAD--compliant with cilostazol and pravastatin. She is managed by cardiology.     Laboratory   Lab Results   Component Value Date    WBC 4.53 05/24/2023    HGB 11.7 (L) 05/24/2023    HCT 36.4 (L) 05/24/2023     05/24/2023    CHOL 120 09/15/2023    TRIG 57 09/15/2023    HDL 42 09/15/2023    ALT 13 09/15/2023    AST 11 09/15/2023     09/15/2023    K 3.7 09/15/2023     09/15/2023    CREATININE 0.8 09/15/2023    BUN 13 09/15/2023    CO2 18 (L) 09/15/2023    INR 1.0 02/07/2023    HGBA1C 7.3 (H) 09/15/2023     Review of Systems   Respiratory:  Negative for shortness of breath.    Cardiovascular:  Positive for leg swelling. Negative for chest pain.   Gastrointestinal:  Positive for diarrhea. Negative for abdominal pain.   Musculoskeletal:  Positive for joint pain.   Neurological:  Positive for weakness.       The following were reviewed: Active problem list, medication list, allergies, family history, social history, and Health Maintenance.     History:  Past Medical History:   Diagnosis Date    Arthritis     Diabetes mellitus, type 2     GERD (gastroesophageal reflux disease)     Gout     Hyperlipidemia     Hypertension     Onychomycosis     PAD (peripheral artery disease)      Past Surgical History:   Procedure Laterality Date    Cardiac Echo      COLONOSCOPY N/A 10/19/2020    Procedure: COLONOSCOPY;   Surgeon: Yousuf Fuller MD;  Location: [facility] ENDO;  Service: Endoscopy;  Laterality: N/A;    ESOPHAGOGASTRODUODENOSCOPY N/A 10/19/2020    Procedure: ESOPHAGOGASTRODUODENOSCOPY (EGD);  Surgeon: Yousuf Fuller MD;  Location: [facility] ENDO;  Service: Endoscopy;  Laterality: N/A;    GALLBLADDER SURGERY      HYSTERECTOMY       Family History   Problem Relation Age of Onset    Diabetes Father     Hypertension Father     Heart attack Sister     Colon cancer Sister     Stroke Sister     Colon cancer Brother      Social History     Socioeconomic History    Marital status:    Tobacco Use    Smoking status: Former     Current packs/day: 1.00     Types: Cigarettes    Smokeless tobacco: Never    Tobacco comments:     Quit 2019 mid year   Substance and Sexual Activity    Alcohol use: No    Drug use: Never    Sexual activity: Not Currently     Patient Active Problem List   Diagnosis    Uncontrolled type 2 diabetes mellitus with hyperglycemia, with long-term current use of insulin    PAD (peripheral artery disease)    Baker cyst, left    Hypertension goal BP (blood pressure) < 130/80    Smoker    Hyperlipidemia LDL goal <70    Onychomycosis    Gout    Dysphagia    Osteopenia of multiple sites    Asymptomatic bilateral carotid artery stenosis    Cervical myelopathy    Neck pain     Review of patient's allergies indicates:  No Known Allergies    Health Maintenance  Health Maintenance Topics with due status: Not Due       Topic Last Completion Date    Colorectal Cancer Screening 10/19/2020    DEXA Scan 09/16/2021    Mammogram 05/24/2023    Diabetes Urine Screening 07/14/2023    Lipid Panel 09/15/2023    Hemoglobin A1c 09/15/2023    High Dose Statin 09/21/2023     Health Maintenance Due   Topic Date Due    TETANUS VACCINE  Never done    Shingles Vaccine (1 of 2) Never done    COVID-19 Vaccine (3 - Moderna series) 05/19/2021    Eye Exam  05/17/2023    Influenza Vaccine (1) 09/01/2023    Foot Exam  11/21/2023  "      Medications:  Current Outpatient Medications on File Prior to Visit   Medication Sig Dispense Refill    acetaminophen (TYLENOL) 160 mg/5 mL Liqd Take 100 mg by mouth daily as needed. Take one tablet by mouth once daily as needed      amLODIPine (NORVASC) 10 MG tablet Take 1 tablet (10 mg total) by mouth once daily. 90 tablet 1    celecoxib (CELEBREX) 200 MG capsule Take 1 capsule (200 mg total) by mouth once daily. 90 capsule 3    cholecalciferol, vitamin D3, 5,000 unit capsule Take 5,000 Units by mouth once daily.  1    cilostazoL (PLETAL) 50 MG Tab Take 1 tablet (50 mg total) by mouth 2 (two) times daily. 90 tablet 3    fluticasone propionate (FLONASE) 50 mcg/actuation nasal spray 2 sprays both nostrils daily prn 16 g 3    hydroCHLOROthiazide (HYDRODIURIL) 12.5 MG Tab Take 1 tablet (12.5 mg total) by mouth once daily. 90 tablet 1    insulin (LANTUS SOLOSTAR U-100 INSULIN) glargine 100 units/mL SubQ pen Inject 8 Units into the skin every evening. 9 mL 3    ipratropium-albuteroL (COMBIVENT RESPIMAT)  mcg/actuation inhaler Inhale 1 puff into the lungs every 6 (six) hours as needed for Wheezing. Rescue 12 g 5    labetaloL (NORMODYNE) 300 MG tablet Take 1 tablet (300 mg total) by mouth every 12 (twelve) hours. 180 tablet 3    losartan (COZAAR) 100 MG tablet Take 1 tablet (100 mg total) by mouth once daily. 90 tablet 1    omeprazole (PRILOSEC) 20 MG capsule Take 1 capsule (20 mg total) by mouth once daily. 90 capsule 3    oxybutynin (DITROPAN-XL) 5 MG TR24 Take 1 tablet (5 mg total) by mouth once daily. 90 tablet 3    pen needle, diabetic (BD MAGDALENA 2ND GEN PEN NEEDLE) 32 gauge x 5/32" Ndle 1 each by Other route 2 (two) times a day. 200 each 1    pravastatin (PRAVACHOL) 80 MG tablet Take 1 tablet (80 mg total) by mouth every evening. 90 tablet 2    empagliflozin (JARDIANCE) 25 mg tablet Take 1 tablet (25 mg total) by mouth once daily. 90 tablet 1    metFORMIN (GLUCOPHAGE-XR) 500 MG ER 24hr tablet Take 2 " tablets (1,000 mg total) by mouth 2 (two) times daily with meals. 360 tablet 1     Medications have been reviewed and reconciled with patient at visit today.    Exam:  Vitals:    09/21/23 0949   BP: 130/70   Pulse:    Resp:    Temp:      Weight: 68.8 kg (151 lb 10.8 oz)   Body mass index is 26.04 kg/m².      Physical Exam  Vitals reviewed.   Constitutional:       General: She is not in acute distress.     Appearance: She is well-developed. She is not ill-appearing.   Eyes:      General: No scleral icterus.  Cardiovascular:      Rate and Rhythm: Normal rate and regular rhythm.      Heart sounds: Normal heart sounds.   Pulmonary:      Effort: Pulmonary effort is normal. No respiratory distress.      Breath sounds: Normal breath sounds.   Abdominal:      General: Bowel sounds are normal.      Palpations: Abdomen is soft.   Musculoskeletal:      Comments: In a wheelchair   Skin:     General: Skin is warm and dry.   Neurological:      Mental Status: She is alert and oriented to person, place, and time.   Psychiatric:         Behavior: Behavior normal.       Assessment:  The primary encounter diagnosis was Annual physical exam. Diagnoses of Hypertension goal BP (blood pressure) < 130/80, Uncontrolled type 2 diabetes mellitus with hyperglycemia, with long-term current use of insulin, Hyperlipidemia LDL goal <70, and PAD (peripheral artery disease) were also pertinent to this visit.    Plan:  Annual physical exam  -     Counseled regarding age appropriate screenings and immunizations  -     Counseled regarding lifestyle modifications    Hypertension goal BP (blood pressure) < 130/80  -     continue losartan, amlodipine, HCTZ and labetalol  -     Lifestyle modifications discussed  -     continue home b/p monitoring    Uncontrolled type 2 diabetes mellitus with hyperglycemia, with long-term current use of insulin  -     start empaglifloz-linaglip-metformin (TRIJARDY XR) 10-5-1,000 mg TBph; Take 1 tablet by mouth once daily.   Dispense: 90 tablet; Refill: 1  -     d/c metformin and Jardiance  -     continue Lantus  -     Lifestyle modifications discussed    Hyperlipidemia LDL goal <70  -     continue pravastatin     Follow up in about 3 months (around 12/21/2023).

## 2023-09-27 ENCOUNTER — PATIENT OUTREACH (OUTPATIENT)
Dept: ADMINISTRATIVE | Facility: HOSPITAL | Age: 73
End: 2023-09-27
Payer: MEDICARE

## 2023-10-02 DIAGNOSIS — E11.65 UNCONTROLLED TYPE 2 DIABETES MELLITUS WITH HYPERGLYCEMIA, WITH LONG-TERM CURRENT USE OF INSULIN: ICD-10-CM

## 2023-10-02 DIAGNOSIS — Z79.4 UNCONTROLLED TYPE 2 DIABETES MELLITUS WITH HYPERGLYCEMIA, WITH LONG-TERM CURRENT USE OF INSULIN: ICD-10-CM

## 2023-10-02 RX ORDER — METFORMIN HYDROCHLORIDE 1000 MG/1
1000 TABLET ORAL 2 TIMES DAILY
Qty: 180 TABLET | Refills: 1 | OUTPATIENT
Start: 2023-10-02

## 2023-10-02 NOTE — TELEPHONE ENCOUNTER
Refill Decision Note   Maryjane Mcintyre  is requesting a refill authorization.  Brief Assessment and Rationale for Refill:  Quick Discontinue     Medication Therapy Plan:  discontinued on 7/28/2023 by Mag Fraire PA-C      Comments:     Note composed:6:27 AM 10/02/2023             Appointments     Last Visit   9/21/2023 Angelita Dorado DO   Next Visit   Visit date not found Angelita Dorado DO

## 2023-10-02 NOTE — TELEPHONE ENCOUNTER
No care due was identified.  [facility] Embedded Care Due Messages. Reference number: [id number].   10/02/2023 5:29:06 AM CDT

## 2023-10-05 ENCOUNTER — PATIENT MESSAGE (OUTPATIENT)
Dept: INTERNAL MEDICINE | Facility: CLINIC | Age: 73
End: 2023-10-05
Payer: MEDICARE

## 2023-10-05 DIAGNOSIS — E78.5 HYPERLIPIDEMIA LDL GOAL <70: Primary | ICD-10-CM

## 2023-10-11 RX ORDER — ROSUVASTATIN CALCIUM 40 MG/1
40 TABLET, COATED ORAL NIGHTLY
Qty: 90 TABLET | Refills: 1 | Status: SHIPPED | OUTPATIENT
Start: 2023-10-11 | End: 2024-02-27 | Stop reason: SDUPTHER

## 2023-10-31 DIAGNOSIS — I10 HYPERTENSION GOAL BP (BLOOD PRESSURE) < 130/80: ICD-10-CM

## 2023-10-31 RX ORDER — HYDROCHLOROTHIAZIDE 12.5 MG/1
12.5 TABLET ORAL DAILY
Qty: 90 TABLET | Refills: 3 | Status: ON HOLD | OUTPATIENT
Start: 2023-10-31 | End: 2024-03-04 | Stop reason: HOSPADM

## 2023-10-31 NOTE — TELEPHONE ENCOUNTER
No care due was identified.  [facility] Embedded Care Due Messages. Reference number: [id number].   10/31/2023 10:21:38 AM CDT

## 2023-10-31 NOTE — TELEPHONE ENCOUNTER
Refill Routing Note   Medication(s) are not appropriate for processing by [facility] for the following reason(s):      No active prescription written by provider    [facility] action(s):  Defer Care Due:  None identified            Appointments  past 12m or future 3m with PCP    Date Provider   Last Visit   9/21/2023 Angelita Dorado DO   Next Visit   Visit date not found Angelita Dorado DO   ED visits in past 90 days: 0        Note composed:12:49 PM 10/31/2023

## 2023-11-17 DIAGNOSIS — E11.59 HYPERTENSION ASSOCIATED WITH DIABETES: Primary | ICD-10-CM

## 2023-11-17 DIAGNOSIS — I15.2 HYPERTENSION ASSOCIATED WITH DIABETES: Primary | ICD-10-CM

## 2023-11-17 DIAGNOSIS — Z00.00 ROUTINE HEALTH MAINTENANCE: ICD-10-CM

## 2023-11-20 ENCOUNTER — TELEPHONE (OUTPATIENT)
Dept: CARDIOLOGY | Facility: CLINIC | Age: 73
End: 2023-11-20
Payer: MEDICARE

## 2023-11-20 NOTE — TELEPHONE ENCOUNTER
Spoke with pt's daughter in regards to rescheduling appt.               ----- Message from Fadia Graham sent at 11/20/2023 11:09 AM CST -----  Contact: renae Condon  ..Type:  Patient Requesting Call    Who Called: Taurus, daughter   Does the patient know what this is regarding?: rescheduling 11/21 appt to 12/21  Would the patient rather a call back or a response via [facility]? call  Best Call Back Number: .[phone]  Additional Information:

## 2023-12-10 DIAGNOSIS — I73.9 PAD (PERIPHERAL ARTERY DISEASE): ICD-10-CM

## 2023-12-11 RX ORDER — CILOSTAZOL 50 MG/1
50 TABLET ORAL 2 TIMES DAILY
Qty: 60 TABLET | Refills: 5 | Status: SHIPPED | OUTPATIENT
Start: 2023-12-11

## 2024-01-06 ENCOUNTER — LAB VISIT (OUTPATIENT)
Dept: LAB | Facility: HOSPITAL | Age: 74
End: 2024-01-06
Attending: PHYSICIAN ASSISTANT
Payer: MEDICARE

## 2024-01-06 DIAGNOSIS — E87.6 HYPOKALEMIA: ICD-10-CM

## 2024-01-06 DIAGNOSIS — I10 HYPERTENSION GOAL BP (BLOOD PRESSURE) < 130/80: ICD-10-CM

## 2024-01-06 LAB
ANION GAP SERPL CALC-SCNC: 12 MMOL/L (ref 8–16)
BUN SERPL-MCNC: 16 MG/DL (ref 8–23)
CALCIUM SERPL-MCNC: 10.3 MG/DL (ref 8.7–10.5)
CHLORIDE SERPL-SCNC: 108 MMOL/L (ref 95–110)
CO2 SERPL-SCNC: 25 MMOL/L (ref 23–29)
CREAT SERPL-MCNC: 1.5 MG/DL (ref 0.5–1.4)
EST. GFR  (NO RACE VARIABLE): 37 ML/MIN/1.73 M^2
GLUCOSE SERPL-MCNC: 169 MG/DL (ref 70–110)
POTASSIUM SERPL-SCNC: 3.1 MMOL/L (ref 3.5–5.1)
SODIUM SERPL-SCNC: 145 MMOL/L (ref 136–145)

## 2024-01-06 PROCEDURE — 80048 BASIC METABOLIC PNL TOTAL CA: CPT | Performed by: PHYSICIAN ASSISTANT

## 2024-01-06 PROCEDURE — 36415 COLL VENOUS BLD VENIPUNCTURE: CPT | Performed by: PHYSICIAN ASSISTANT

## 2024-01-08 DIAGNOSIS — E87.6 HYPOKALEMIA: Primary | ICD-10-CM

## 2024-01-08 RX ORDER — AMLODIPINE BESYLATE 10 MG/1
10 TABLET ORAL
Qty: 90 TABLET | Refills: 3 | Status: SHIPPED | OUTPATIENT
Start: 2024-01-08

## 2024-01-08 RX ORDER — POTASSIUM CHLORIDE 750 MG/1
10 TABLET, EXTENDED RELEASE ORAL DAILY
Qty: 30 TABLET | Refills: 1 | Status: SHIPPED | OUTPATIENT
Start: 2024-01-08

## 2024-01-08 NOTE — TELEPHONE ENCOUNTER
No new care gaps identified.  [facility] Embedded Care Gaps. Reference number: [id number]. 7/11/2022   4:44:06 PM CDT   Presenting Symptoms/Treatment Related Factors

## 2024-02-20 DIAGNOSIS — I10 HYPERTENSION GOAL BP (BLOOD PRESSURE) < 130/80: ICD-10-CM

## 2024-02-21 RX ORDER — LOSARTAN POTASSIUM 100 MG/1
100 TABLET ORAL
Qty: 90 TABLET | Refills: 2 | Status: ON HOLD | OUTPATIENT
Start: 2024-02-21 | End: 2024-03-04 | Stop reason: HOSPADM

## 2024-02-27 ENCOUNTER — OFFICE VISIT (OUTPATIENT)
Dept: INTERNAL MEDICINE | Facility: CLINIC | Age: 74
End: 2024-02-27
Payer: MEDICARE

## 2024-02-27 VITALS
SYSTOLIC BLOOD PRESSURE: 138 MMHG | OXYGEN SATURATION: 96 % | BODY MASS INDEX: 25.14 KG/M2 | WEIGHT: 147.25 LBS | HEART RATE: 87 BPM | TEMPERATURE: 97 F | HEIGHT: 64 IN | DIASTOLIC BLOOD PRESSURE: 62 MMHG

## 2024-02-27 DIAGNOSIS — E11.9 TYPE 2 DIABETES MELLITUS WITHOUT COMPLICATION, WITH LONG-TERM CURRENT USE OF INSULIN: ICD-10-CM

## 2024-02-27 DIAGNOSIS — I65.23 ASYMPTOMATIC BILATERAL CAROTID ARTERY STENOSIS: ICD-10-CM

## 2024-02-27 DIAGNOSIS — Z79.4 TYPE 2 DIABETES MELLITUS WITHOUT COMPLICATION, WITH LONG-TERM CURRENT USE OF INSULIN: ICD-10-CM

## 2024-02-27 DIAGNOSIS — E78.5 HYPERLIPIDEMIA LDL GOAL <70: Primary | ICD-10-CM

## 2024-02-27 DIAGNOSIS — I10 HYPERTENSION GOAL BP (BLOOD PRESSURE) < 130/80: ICD-10-CM

## 2024-02-27 PROCEDURE — 99214 OFFICE O/P EST MOD 30 MIN: CPT | Mod: S$GLB,,, | Performed by: PHYSICIAN ASSISTANT

## 2024-02-27 PROCEDURE — 99999 PR PBB SHADOW E&M-EST. PATIENT-LVL IV: CPT | Mod: PBBFAC,,, | Performed by: PHYSICIAN ASSISTANT

## 2024-02-27 RX ORDER — ROSUVASTATIN CALCIUM 40 MG/1
40 TABLET, COATED ORAL NIGHTLY
Qty: 90 TABLET | Refills: 1 | Status: SHIPPED | OUTPATIENT
Start: 2024-02-27

## 2024-02-27 RX ORDER — PRAVASTATIN SODIUM 80 MG/1
1 TABLET ORAL NIGHTLY
COMMUNITY
End: 2024-02-27

## 2024-02-27 NOTE — PROGRESS NOTES
"Subjective:      Patient ID: Maryjane Mcintyre is a 73 y.o. female.    Chief Complaint: Annual Exam, Knee Pain, and Hand Pain    Patient is known to me, being seen today for follow up.   Denies current concerns/complaints     HTN- labetalol 300mg bid, losartan 100mg, amlodipine 10mg, HCTZ 12.5mg   HLD- on statin therapy   DM- A1c 7.3%, trijardy daily, lantus 8 units   Sugar has been elevated (200s), admits not following diabetic diet   Potassium 10mEq    Needs handicap form     Due for labs     Last visit Sept 2023 with PCP.       Review of Systems   Constitutional:  Negative for chills, diaphoresis and fever.   HENT:  Negative for congestion, rhinorrhea and sore throat.    Respiratory:  Negative for cough, shortness of breath and wheezing.    Gastrointestinal:  Negative for abdominal pain, constipation, diarrhea, nausea and vomiting.   Skin:  Negative for rash.   Neurological:  Negative for dizziness, light-headedness and headaches.       Objective:   /62   Pulse 87   Temp 96.6 °F (35.9 °C) (Tympanic)   Ht 5' 4" (1.626 m)   Wt 66.8 kg (147 lb 4.3 oz)   SpO2 96%   BMI 25.28 kg/m²   Physical Exam  Constitutional:       General: She is not in acute distress.     Appearance: Normal appearance. She is well-developed. She is not ill-appearing.   HENT:      Head: Normocephalic and atraumatic.   Cardiovascular:      Rate and Rhythm: Normal rate and regular rhythm.      Heart sounds: Normal heart sounds. No murmur heard.  Pulmonary:      Effort: Pulmonary effort is normal. No respiratory distress.      Breath sounds: Normal breath sounds. No decreased breath sounds.   Musculoskeletal:      Right lower leg: No edema.      Left lower leg: No edema.   Skin:     General: Skin is warm and dry.      Findings: No rash.   Psychiatric:         Speech: Speech normal.         Behavior: Behavior normal.         Thought Content: Thought content normal.       Assessment:      1. Hyperlipidemia LDL goal <70    2. Hypertension goal " BP (blood pressure) < 130/80    3. Asymptomatic bilateral carotid artery stenosis    4. Type 2 diabetes mellitus without complication, with long-term current use of insulin       Plan:   Hyperlipidemia LDL goal <70  -     Lipid Panel; Future; Expected date: 02/27/2024  -     Lipid Panel; Future; Expected date: 02/27/2024  -     rosuvastatin (CRESTOR) 40 MG Tab; Take 1 tablet (40 mg total) by mouth every evening.  Dispense: 90 tablet; Refill: 1    Hypertension goal BP (blood pressure) < 130/80  -     CBC Auto Differential; Future; Expected date: 02/27/2024  -     Comprehensive Metabolic Panel; Future; Expected date: 02/27/2024    Asymptomatic bilateral carotid artery stenosis   Followed by Card     Type 2 diabetes mellitus without complication, with long-term current use of insulin  -     Hemoglobin A1C; Future; Expected date: 02/27/2024  -     Comprehensive Metabolic Panel; Future; Expected date: 02/27/2024  -     Hemoglobin A1C; Future; Expected date: 02/27/2024      Handicap tag completed     Discussed needed vaccines     [place] eye exam     Fasting labs     3mth f/u

## 2024-02-27 NOTE — LETTER
February 27, 2024      [facility]  [id number] [place] [address]  Phone: [phone]  Fax: [phone]       Patient: Maryjane Mcintyre   YOB: 1950  Date of Visit: 02/27/2024    To Whom It May Concern:    Daiana Mcintyre  was at [facility] on 02/27/2024. The patient may return to work/school on 2/28/2024 with no restrictions. If you have any questions or concerns, or if I can be of further assistance, please do not hesitate to contact me.    Sincerely,    Calvin Vázquez MA

## 2024-03-02 ENCOUNTER — HOSPITAL ENCOUNTER (INPATIENT)
Facility: HOSPITAL | Age: 74
LOS: 2 days | Discharge: HOME HEALTH | DRG: 641 | End: 2024-03-04
Attending: EMERGENCY MEDICINE | Admitting: INTERNAL MEDICINE
Payer: MEDICARE

## 2024-03-02 DIAGNOSIS — R07.9 CHEST PAIN: ICD-10-CM

## 2024-03-02 DIAGNOSIS — I50.32 CHRONIC DIASTOLIC CONGESTIVE HEART FAILURE: ICD-10-CM

## 2024-03-02 DIAGNOSIS — R94.31 ABNORMAL EKG: Primary | ICD-10-CM

## 2024-03-02 DIAGNOSIS — E87.6 HYPOKALEMIA: ICD-10-CM

## 2024-03-02 LAB
ANION GAP SERPL CALC-SCNC: 10 MMOL/L (ref 8–16)
ANION GAP SERPL CALC-SCNC: 14 MMOL/L (ref 8–16)
BASOPHILS # BLD AUTO: 0.04 K/UL (ref 0–0.2)
BASOPHILS NFR BLD: 0.8 % (ref 0–1.9)
BUN SERPL-MCNC: 16 MG/DL (ref 8–23)
BUN SERPL-MCNC: 17 MG/DL (ref 8–23)
CALCIUM SERPL-MCNC: 8.7 MG/DL (ref 8.7–10.5)
CALCIUM SERPL-MCNC: 8.9 MG/DL (ref 8.7–10.5)
CHLORIDE SERPL-SCNC: 107 MMOL/L (ref 95–110)
CHLORIDE SERPL-SCNC: 110 MMOL/L (ref 95–110)
CO2 SERPL-SCNC: 20 MMOL/L (ref 23–29)
CO2 SERPL-SCNC: 22 MMOL/L (ref 23–29)
CREAT SERPL-MCNC: 1.9 MG/DL (ref 0.5–1.4)
CREAT SERPL-MCNC: 2.1 MG/DL (ref 0.5–1.4)
DIFFERENTIAL METHOD BLD: ABNORMAL
EOSINOPHIL # BLD AUTO: 0.3 K/UL (ref 0–0.5)
EOSINOPHIL NFR BLD: 5 % (ref 0–8)
ERYTHROCYTE [DISTWIDTH] IN BLOOD BY AUTOMATED COUNT: 14.9 % (ref 11.5–14.5)
EST. GFR  (NO RACE VARIABLE): 25 ML/MIN/1.73 M^2
EST. GFR  (NO RACE VARIABLE): 28 ML/MIN/1.73 M^2
GLUCOSE SERPL-MCNC: 173 MG/DL (ref 70–110)
GLUCOSE SERPL-MCNC: 225 MG/DL (ref 70–110)
HCT VFR BLD AUTO: 28.5 % (ref 37–48.5)
HGB BLD-MCNC: 9.3 G/DL (ref 12–16)
IMM GRANULOCYTES # BLD AUTO: 0.01 K/UL (ref 0–0.04)
IMM GRANULOCYTES NFR BLD AUTO: 0.2 % (ref 0–0.5)
LYMPHOCYTES # BLD AUTO: 1.6 K/UL (ref 1–4.8)
LYMPHOCYTES NFR BLD: 32.8 % (ref 18–48)
MAGNESIUM SERPL-MCNC: 1.4 MG/DL (ref 1.6–2.6)
MCH RBC QN AUTO: 26.3 PG (ref 27–31)
MCHC RBC AUTO-ENTMCNC: 32.6 G/DL (ref 32–36)
MCV RBC AUTO: 81 FL (ref 82–98)
MONOCYTES # BLD AUTO: 0.5 K/UL (ref 0.3–1)
MONOCYTES NFR BLD: 10.8 % (ref 4–15)
NEUTROPHILS # BLD AUTO: 2.5 K/UL (ref 1.8–7.7)
NEUTROPHILS NFR BLD: 50.4 % (ref 38–73)
NRBC BLD-RTO: 0 /100 WBC
PLATELET # BLD AUTO: 203 K/UL (ref 150–450)
PMV BLD AUTO: 10.8 FL (ref 9.2–12.9)
POTASSIUM SERPL-SCNC: 2.3 MMOL/L (ref 3.5–5.1)
POTASSIUM SERPL-SCNC: 2.3 MMOL/L (ref 3.5–5.1)
RBC # BLD AUTO: 3.54 M/UL (ref 4–5.4)
SODIUM SERPL-SCNC: 141 MMOL/L (ref 136–145)
SODIUM SERPL-SCNC: 142 MMOL/L (ref 136–145)
WBC # BLD AUTO: 5 K/UL (ref 3.9–12.7)

## 2024-03-02 PROCEDURE — 93005 ELECTROCARDIOGRAM TRACING: CPT

## 2024-03-02 PROCEDURE — 25000003 PHARM REV CODE 250: Performed by: EMERGENCY MEDICINE

## 2024-03-02 PROCEDURE — 96376 TX/PRO/DX INJ SAME DRUG ADON: CPT

## 2024-03-02 PROCEDURE — 96368 THER/DIAG CONCURRENT INF: CPT

## 2024-03-02 PROCEDURE — 80048 BASIC METABOLIC PNL TOTAL CA: CPT | Mod: XB | Performed by: EMERGENCY MEDICINE

## 2024-03-02 PROCEDURE — 93010 ELECTROCARDIOGRAM REPORT: CPT | Mod: ,,, | Performed by: INTERNAL MEDICINE

## 2024-03-02 PROCEDURE — 11000001 HC ACUTE MED/SURG PRIVATE ROOM

## 2024-03-02 PROCEDURE — 93010 ELECTROCARDIOGRAM REPORT: CPT | Mod: 76,,, | Performed by: INTERNAL MEDICINE

## 2024-03-02 PROCEDURE — 85025 COMPLETE CBC W/AUTO DIFF WBC: CPT | Performed by: EMERGENCY MEDICINE

## 2024-03-02 PROCEDURE — 63600175 PHARM REV CODE 636 W HCPCS: Performed by: EMERGENCY MEDICINE

## 2024-03-02 PROCEDURE — 83735 ASSAY OF MAGNESIUM: CPT | Performed by: EMERGENCY MEDICINE

## 2024-03-02 PROCEDURE — 99291 CRITICAL CARE FIRST HOUR: CPT

## 2024-03-02 PROCEDURE — 80048 BASIC METABOLIC PNL TOTAL CA: CPT | Mod: 91,XB | Performed by: EMERGENCY MEDICINE

## 2024-03-02 PROCEDURE — 96365 THER/PROPH/DIAG IV INF INIT: CPT

## 2024-03-02 PROCEDURE — 96366 THER/PROPH/DIAG IV INF ADDON: CPT

## 2024-03-02 RX ORDER — SODIUM CHLORIDE 0.9 % (FLUSH) 0.9 %
10 SYRINGE (ML) INJECTION
Status: DISCONTINUED | OUTPATIENT
Start: 2024-03-03 | End: 2024-03-04 | Stop reason: HOSPADM

## 2024-03-02 RX ORDER — POTASSIUM CHLORIDE 7.45 MG/ML
10 INJECTION INTRAVENOUS
Status: COMPLETED | OUTPATIENT
Start: 2024-03-02 | End: 2024-03-02

## 2024-03-02 RX ORDER — IBUPROFEN 200 MG
24 TABLET ORAL
Status: DISCONTINUED | OUTPATIENT
Start: 2024-03-03 | End: 2024-03-04 | Stop reason: HOSPADM

## 2024-03-02 RX ORDER — POTASSIUM CHLORIDE 7.45 MG/ML
10 INJECTION INTRAVENOUS
Status: COMPLETED | OUTPATIENT
Start: 2024-03-03 | End: 2024-03-03

## 2024-03-02 RX ORDER — POTASSIUM CHLORIDE 20 MEQ/1
40 TABLET, EXTENDED RELEASE ORAL
Status: DISCONTINUED | OUTPATIENT
Start: 2024-03-02 | End: 2024-03-02

## 2024-03-02 RX ORDER — INSULIN ASPART 100 [IU]/ML
0-10 INJECTION, SOLUTION INTRAVENOUS; SUBCUTANEOUS
Status: DISCONTINUED | OUTPATIENT
Start: 2024-03-03 | End: 2024-03-04 | Stop reason: HOSPADM

## 2024-03-02 RX ORDER — POTASSIUM CHLORIDE 750 MG/1
10 TABLET, EXTENDED RELEASE ORAL
Status: DISCONTINUED | OUTPATIENT
Start: 2024-03-02 | End: 2024-03-03

## 2024-03-02 RX ORDER — MAGNESIUM SULFATE HEPTAHYDRATE 40 MG/ML
2 INJECTION, SOLUTION INTRAVENOUS
Status: COMPLETED | OUTPATIENT
Start: 2024-03-02 | End: 2024-03-02

## 2024-03-02 RX ORDER — GLUCAGON 1 MG
1 KIT INJECTION
Status: DISCONTINUED | OUTPATIENT
Start: 2024-03-03 | End: 2024-03-04 | Stop reason: HOSPADM

## 2024-03-02 RX ORDER — IBUPROFEN 200 MG
16 TABLET ORAL
Status: DISCONTINUED | OUTPATIENT
Start: 2024-03-03 | End: 2024-03-04 | Stop reason: HOSPADM

## 2024-03-02 RX ADMIN — MAGNESIUM SULFATE HEPTAHYDRATE 2 G: 40 INJECTION, SOLUTION INTRAVENOUS at 09:03

## 2024-03-02 RX ADMIN — POTASSIUM CHLORIDE 40 MEQ: 1500 TABLET, EXTENDED RELEASE ORAL at 07:03

## 2024-03-02 RX ADMIN — POTASSIUM CHLORIDE 10 MEQ: 7.46 INJECTION, SOLUTION INTRAVENOUS at 08:03

## 2024-03-02 RX ADMIN — POTASSIUM CHLORIDE 10 MEQ: 7.46 INJECTION, SOLUTION INTRAVENOUS at 11:03

## 2024-03-02 RX ADMIN — SODIUM CHLORIDE 1000 ML: 9 INJECTION, SOLUTION INTRAVENOUS at 09:03

## 2024-03-02 NOTE — PROGRESS NOTES
Was called with a critical potassium. 2.2. Called patient family. Spoke to 2 daughters. Advise present directly to the emergency department. Agreed and understood.

## 2024-03-03 PROBLEM — E87.6 HYPOKALEMIA: Status: ACTIVE | Noted: 2024-03-03

## 2024-03-03 PROBLEM — R19.7 DIARRHEA OF PRESUMED INFECTIOUS ORIGIN: Status: ACTIVE | Noted: 2024-03-03

## 2024-03-03 PROBLEM — E83.42 HYPOMAGNESEMIA: Status: ACTIVE | Noted: 2024-03-03

## 2024-03-03 PROBLEM — N17.9 AKI (ACUTE KIDNEY INJURY): Status: ACTIVE | Noted: 2024-03-03

## 2024-03-03 LAB
ALBUMIN SERPL BCP-MCNC: 3.6 G/DL (ref 3.5–5.2)
ALP SERPL-CCNC: 39 U/L (ref 55–135)
ALT SERPL W/O P-5'-P-CCNC: 56 U/L (ref 10–44)
ANION GAP SERPL CALC-SCNC: 10 MMOL/L (ref 8–16)
ANION GAP SERPL CALC-SCNC: 10 MMOL/L (ref 8–16)
ANION GAP SERPL CALC-SCNC: 11 MMOL/L (ref 8–16)
ANION GAP SERPL CALC-SCNC: 11 MMOL/L (ref 8–16)
ANION GAP SERPL CALC-SCNC: 7 MMOL/L (ref 8–16)
ANION GAP SERPL CALC-SCNC: 8 MMOL/L (ref 8–16)
ANION GAP SERPL CALC-SCNC: 8 MMOL/L (ref 8–16)
ASCENDING AORTA: 2.04 CM
AST SERPL-CCNC: 44 U/L (ref 10–40)
AV INDEX (PROSTH): 0.77
AV MEAN GRADIENT: 5 MMHG
AV PEAK GRADIENT: 9 MMHG
AV VALVE AREA BY VELOCITY RATIO: 2.05 CM²
AV VALVE AREA: 2.31 CM²
AV VELOCITY RATIO: 0.69
BACTERIA #/AREA URNS HPF: NORMAL /HPF
BASOPHILS # BLD AUTO: 0.02 K/UL (ref 0–0.2)
BASOPHILS NFR BLD: 0.5 % (ref 0–1.9)
BILIRUB SERPL-MCNC: 0.5 MG/DL (ref 0.1–1)
BILIRUB UR QL STRIP: NEGATIVE
BNP SERPL-MCNC: 65 PG/ML (ref 0–99)
BSA FOR ECHO PROCEDURE: 1.73 M2
BUN SERPL-MCNC: 14 MG/DL (ref 8–23)
BUN SERPL-MCNC: 16 MG/DL (ref 8–23)
CALCIUM SERPL-MCNC: 8.6 MG/DL (ref 8.7–10.5)
CALCIUM SERPL-MCNC: 8.8 MG/DL (ref 8.7–10.5)
CALCIUM SERPL-MCNC: 8.9 MG/DL (ref 8.7–10.5)
CALCIUM SERPL-MCNC: 8.9 MG/DL (ref 8.7–10.5)
CALCIUM SERPL-MCNC: 9 MG/DL (ref 8.7–10.5)
CALCIUM SERPL-MCNC: 9.1 MG/DL (ref 8.7–10.5)
CALCIUM SERPL-MCNC: 9.1 MG/DL (ref 8.7–10.5)
CHLORIDE SERPL-SCNC: 108 MMOL/L (ref 95–110)
CHLORIDE SERPL-SCNC: 110 MMOL/L (ref 95–110)
CHLORIDE SERPL-SCNC: 111 MMOL/L (ref 95–110)
CHLORIDE SERPL-SCNC: 111 MMOL/L (ref 95–110)
CHLORIDE SERPL-SCNC: 113 MMOL/L (ref 95–110)
CLARITY UR: CLEAR
CO2 SERPL-SCNC: 22 MMOL/L (ref 23–29)
CO2 SERPL-SCNC: 23 MMOL/L (ref 23–29)
CO2 SERPL-SCNC: 23 MMOL/L (ref 23–29)
COLOR UR: COLORLESS
CREAT SERPL-MCNC: 1.8 MG/DL (ref 0.5–1.4)
CREAT SERPL-MCNC: 1.8 MG/DL (ref 0.5–1.4)
CREAT SERPL-MCNC: 1.9 MG/DL (ref 0.5–1.4)
CREAT UR-MCNC: 15.9 MG/DL (ref 15–325)
CV ECHO LV RWT: 0.47 CM
DIFFERENTIAL METHOD BLD: ABNORMAL
DOP CALC AO PEAK VEL: 1.5 M/S
DOP CALC AO VTI: 33.1 CM
DOP CALC LVOT AREA: 3 CM2
DOP CALC LVOT DIAMETER: 1.95 CM
DOP CALC LVOT PEAK VEL: 1.03 M/S
DOP CALC LVOT STROKE VOLUME: 76.42 CM3
DOP CALCLVOT PEAK VEL VTI: 25.6 CM
E WAVE DECELERATION TIME: 217.4 MSEC
E/E' RATIO: 11.79 M/S
ECHO LV POSTERIOR WALL: 0.97 CM (ref 0.6–1.1)
EOSINOPHIL # BLD AUTO: 0.2 K/UL (ref 0–0.5)
EOSINOPHIL NFR BLD: 3.9 % (ref 0–8)
ERYTHROCYTE [DISTWIDTH] IN BLOOD BY AUTOMATED COUNT: 15.1 % (ref 11.5–14.5)
EST. GFR  (NO RACE VARIABLE): 28 ML/MIN/1.73 M^2
EST. GFR  (NO RACE VARIABLE): 30 ML/MIN/1.73 M^2
EST. GFR  (NO RACE VARIABLE): 30 ML/MIN/1.73 M^2
FRACTIONAL SHORTENING: 34 % (ref 28–44)
GLUCOSE SERPL-MCNC: 132 MG/DL (ref 70–110)
GLUCOSE SERPL-MCNC: 163 MG/DL (ref 70–110)
GLUCOSE SERPL-MCNC: 171 MG/DL (ref 70–110)
GLUCOSE SERPL-MCNC: 174 MG/DL (ref 70–110)
GLUCOSE SERPL-MCNC: 188 MG/DL (ref 70–110)
GLUCOSE SERPL-MCNC: 188 MG/DL (ref 70–110)
GLUCOSE SERPL-MCNC: 228 MG/DL (ref 70–110)
GLUCOSE UR QL STRIP: ABNORMAL
HCT VFR BLD AUTO: 27.3 % (ref 37–48.5)
HGB BLD-MCNC: 8.8 G/DL (ref 12–16)
HGB UR QL STRIP: ABNORMAL
HYALINE CASTS #/AREA URNS LPF: 0 /LPF
IMM GRANULOCYTES # BLD AUTO: 0.01 K/UL (ref 0–0.04)
IMM GRANULOCYTES NFR BLD AUTO: 0.2 % (ref 0–0.5)
INTERVENTRICULAR SEPTUM: 1.02 CM (ref 0.6–1.1)
IVC DIAMETER: 1.16 CM
IVRT: 102.76 MSEC
KETONES UR QL STRIP: NEGATIVE
LA MAJOR: 5.31 CM
LA MINOR: 5.37 CM
LEFT ATRIUM SIZE: 3.71 CM
LEFT INTERNAL DIMENSION IN SYSTOLE: 2.74 CM (ref 2.1–4)
LEFT VENTRICLE DIASTOLIC VOLUME INDEX: 44.75 ML/M2
LEFT VENTRICLE DIASTOLIC VOLUME: 76.53 ML
LEFT VENTRICLE MASS INDEX: 78 G/M2
LEFT VENTRICLE SYSTOLIC VOLUME INDEX: 16.5 ML/M2
LEFT VENTRICLE SYSTOLIC VOLUME: 28.13 ML
LEFT VENTRICULAR INTERNAL DIMENSION IN DIASTOLE: 4.15 CM (ref 3.5–6)
LEFT VENTRICULAR MASS: 133.72 G
LEUKOCYTE ESTERASE UR QL STRIP: ABNORMAL
LV LATERAL E/E' RATIO: 12.44 M/S
LV SEPTAL E/E' RATIO: 11.2 M/S
LVOT MG: 2.35 MMHG
LVOT MV: 0.74 CM/S
LYMPHOCYTES # BLD AUTO: 1.2 K/UL (ref 1–4.8)
LYMPHOCYTES NFR BLD: 27.8 % (ref 18–48)
MAGNESIUM SERPL-MCNC: 2.2 MG/DL (ref 1.6–2.6)
MAGNESIUM SERPL-MCNC: 2.5 MG/DL (ref 1.6–2.6)
MAGNESIUM SERPL-MCNC: 2.6 MG/DL (ref 1.6–2.6)
MCH RBC QN AUTO: 26 PG (ref 27–31)
MCHC RBC AUTO-ENTMCNC: 32.2 G/DL (ref 32–36)
MCV RBC AUTO: 81 FL (ref 82–98)
MICROSCOPIC COMMENT: NORMAL
MONOCYTES # BLD AUTO: 0.4 K/UL (ref 0.3–1)
MONOCYTES NFR BLD: 9.7 % (ref 4–15)
MV PEAK E VEL: 1.12 M/S
MV STENOSIS PRESSURE HALF TIME: 63.05 MS
MV VALVE AREA P 1/2 METHOD: 3.49 CM2
NEUTROPHILS # BLD AUTO: 2.4 K/UL (ref 1.8–7.7)
NEUTROPHILS NFR BLD: 57.9 % (ref 38–73)
NITRITE UR QL STRIP: NEGATIVE
NRBC BLD-RTO: 0 /100 WBC
OHS QRS DURATION: 162 MS
OHS QRS DURATION: 176 MS
OHS QRS DURATION: 92 MS
OHS QTC CALCULATION: 483 MS
OHS QTC CALCULATION: 545 MS
OHS QTC CALCULATION: 546 MS
PH UR STRIP: 8 [PH] (ref 5–8)
PHOSPHATE SERPL-MCNC: 1.7 MG/DL (ref 2.7–4.5)
PISA TR MAX VEL: 2.63 M/S
PLATELET # BLD AUTO: 175 K/UL (ref 150–450)
PMV BLD AUTO: 11.2 FL (ref 9.2–12.9)
POCT GLUCOSE: 154 MG/DL (ref 70–110)
POCT GLUCOSE: 172 MG/DL (ref 70–110)
POCT GLUCOSE: 190 MG/DL (ref 70–110)
POCT GLUCOSE: 202 MG/DL (ref 70–110)
POCT GLUCOSE: 268 MG/DL (ref 70–110)
POTASSIUM SERPL-SCNC: 2.7 MMOL/L (ref 3.5–5.1)
POTASSIUM SERPL-SCNC: 2.8 MMOL/L (ref 3.5–5.1)
POTASSIUM SERPL-SCNC: 2.8 MMOL/L (ref 3.5–5.1)
POTASSIUM SERPL-SCNC: 3 MMOL/L (ref 3.5–5.1)
POTASSIUM SERPL-SCNC: 3.2 MMOL/L (ref 3.5–5.1)
POTASSIUM SERPL-SCNC: 3.2 MMOL/L (ref 3.5–5.1)
POTASSIUM SERPL-SCNC: 3.3 MMOL/L (ref 3.5–5.1)
POTASSIUM UR-SCNC: <10 MMOL/L (ref 15–95)
PROT SERPL-MCNC: 6.3 G/DL (ref 6–8.4)
PROT UR QL STRIP: ABNORMAL
PV MV: 0.83 M/S
PV PEAK GRADIENT: 6 MMHG
PV PEAK VELOCITY: 1.2 M/S
RA MAJOR: 4.63 CM
RA PRESSURE ESTIMATED: 3 MMHG
RBC # BLD AUTO: 3.39 M/UL (ref 4–5.4)
RBC #/AREA URNS HPF: 0 /HPF (ref 0–4)
RV TB RVSP: 6 MMHG
SODIUM SERPL-SCNC: 138 MMOL/L (ref 136–145)
SODIUM SERPL-SCNC: 140 MMOL/L (ref 136–145)
SODIUM SERPL-SCNC: 141 MMOL/L (ref 136–145)
SODIUM SERPL-SCNC: 143 MMOL/L (ref 136–145)
SODIUM SERPL-SCNC: 146 MMOL/L (ref 136–145)
SP GR UR STRIP: 1 (ref 1–1.03)
SQUAMOUS #/AREA URNS HPF: 2 /HPF
STJ: 2.29 CM
TDI LATERAL: 0.09 M/S
TDI SEPTAL: 0.1 M/S
TDI: 0.1 M/S
TR MAX PG: 28 MMHG
TRICUSPID ANNULAR PLANE SYSTOLIC EXCURSION: 1.93 CM
TV REST PULMONARY ARTERY PRESSURE: 31 MMHG
URN SPEC COLLECT METH UR: ABNORMAL
UROBILINOGEN UR STRIP-ACNC: NEGATIVE EU/DL
WBC # BLD AUTO: 4.13 K/UL (ref 3.9–12.7)
WBC #/AREA URNS HPF: 0 /HPF (ref 0–5)
YEAST URNS QL MICRO: NORMAL
Z-SCORE OF LEFT VENTRICULAR DIMENSION IN END DIASTOLE: -1.35
Z-SCORE OF LEFT VENTRICULAR DIMENSION IN END SYSTOLE: -0.56

## 2024-03-03 PROCEDURE — 83735 ASSAY OF MAGNESIUM: CPT | Mod: 91 | Performed by: STUDENT IN AN ORGANIZED HEALTH CARE EDUCATION/TRAINING PROGRAM

## 2024-03-03 PROCEDURE — 97162 PT EVAL MOD COMPLEX 30 MIN: CPT

## 2024-03-03 PROCEDURE — 25000003 PHARM REV CODE 250: Performed by: FAMILY MEDICINE

## 2024-03-03 PROCEDURE — 25000003 PHARM REV CODE 250: Performed by: NURSE PRACTITIONER

## 2024-03-03 PROCEDURE — 80053 COMPREHEN METABOLIC PANEL: CPT | Performed by: NURSE PRACTITIONER

## 2024-03-03 PROCEDURE — 63600175 PHARM REV CODE 636 W HCPCS: Performed by: NURSE PRACTITIONER

## 2024-03-03 PROCEDURE — 97166 OT EVAL MOD COMPLEX 45 MIN: CPT

## 2024-03-03 PROCEDURE — 85025 COMPLETE CBC W/AUTO DIFF WBC: CPT | Performed by: NURSE PRACTITIONER

## 2024-03-03 PROCEDURE — 80048 BASIC METABOLIC PNL TOTAL CA: CPT | Mod: 91,XB | Performed by: STUDENT IN AN ORGANIZED HEALTH CARE EDUCATION/TRAINING PROGRAM

## 2024-03-03 PROCEDURE — 82088 ASSAY OF ALDOSTERONE: CPT | Performed by: NURSE PRACTITIONER

## 2024-03-03 PROCEDURE — 36415 COLL VENOUS BLD VENIPUNCTURE: CPT | Performed by: NURSE PRACTITIONER

## 2024-03-03 PROCEDURE — 27000207 HC ISOLATION

## 2024-03-03 PROCEDURE — 02HV33Z INSERTION OF INFUSION DEVICE INTO SUPERIOR VENA CAVA, PERCUTANEOUS APPROACH: ICD-10-PCS | Performed by: INTERNAL MEDICINE

## 2024-03-03 PROCEDURE — 97530 THERAPEUTIC ACTIVITIES: CPT

## 2024-03-03 PROCEDURE — 36569 INSJ PICC 5 YR+ W/O IMAGING: CPT

## 2024-03-03 PROCEDURE — 82570 ASSAY OF URINE CREATININE: CPT | Performed by: NURSE PRACTITIONER

## 2024-03-03 PROCEDURE — 25000003 PHARM REV CODE 250: Performed by: INTERNAL MEDICINE

## 2024-03-03 PROCEDURE — 93010 ELECTROCARDIOGRAM REPORT: CPT | Mod: ,,, | Performed by: INTERNAL MEDICINE

## 2024-03-03 PROCEDURE — 84100 ASSAY OF PHOSPHORUS: CPT

## 2024-03-03 PROCEDURE — 63600175 PHARM REV CODE 636 W HCPCS: Performed by: EMERGENCY MEDICINE

## 2024-03-03 PROCEDURE — 84133 ASSAY OF URINE POTASSIUM: CPT | Performed by: NURSE PRACTITIONER

## 2024-03-03 PROCEDURE — 21400001 HC TELEMETRY ROOM

## 2024-03-03 PROCEDURE — 80048 BASIC METABOLIC PNL TOTAL CA: CPT | Mod: 91 | Performed by: NURSE PRACTITIONER

## 2024-03-03 PROCEDURE — 93005 ELECTROCARDIOGRAM TRACING: CPT

## 2024-03-03 PROCEDURE — 83735 ASSAY OF MAGNESIUM: CPT | Mod: 91 | Performed by: NURSE PRACTITIONER

## 2024-03-03 PROCEDURE — C1751 CATH, INF, PER/CENT/MIDLINE: HCPCS

## 2024-03-03 PROCEDURE — 81000 URINALYSIS NONAUTO W/SCOPE: CPT | Performed by: NURSE PRACTITIONER

## 2024-03-03 PROCEDURE — 83880 ASSAY OF NATRIURETIC PEPTIDE: CPT | Performed by: NURSE PRACTITIONER

## 2024-03-03 RX ORDER — MAGNESIUM SULFATE HEPTAHYDRATE 40 MG/ML
2 INJECTION, SOLUTION INTRAVENOUS
Status: DISCONTINUED | OUTPATIENT
Start: 2024-03-03 | End: 2024-03-03

## 2024-03-03 RX ORDER — CILOSTAZOL 50 MG/1
50 TABLET ORAL 2 TIMES DAILY
Status: DISCONTINUED | OUTPATIENT
Start: 2024-03-03 | End: 2024-03-04 | Stop reason: HOSPADM

## 2024-03-03 RX ORDER — AMLODIPINE BESYLATE 10 MG/1
10 TABLET ORAL DAILY
Status: DISCONTINUED | OUTPATIENT
Start: 2024-03-03 | End: 2024-03-04 | Stop reason: HOSPADM

## 2024-03-03 RX ORDER — FAMOTIDINE 20 MG/1
20 TABLET, FILM COATED ORAL DAILY
Status: DISCONTINUED | OUTPATIENT
Start: 2024-03-03 | End: 2024-03-04 | Stop reason: HOSPADM

## 2024-03-03 RX ORDER — POTASSIUM CHLORIDE 20 MEQ/1
40 TABLET, EXTENDED RELEASE ORAL ONCE
Status: COMPLETED | OUTPATIENT
Start: 2024-03-03 | End: 2024-03-03

## 2024-03-03 RX ORDER — POTASSIUM CHLORIDE 29.8 MG/ML
40 INJECTION INTRAVENOUS ONCE
Status: COMPLETED | OUTPATIENT
Start: 2024-03-03 | End: 2024-03-03

## 2024-03-03 RX ORDER — LABETALOL 100 MG/1
300 TABLET, FILM COATED ORAL EVERY 12 HOURS
Status: DISCONTINUED | OUTPATIENT
Start: 2024-03-03 | End: 2024-03-04 | Stop reason: HOSPADM

## 2024-03-03 RX ORDER — SODIUM CHLORIDE 450 MG/100ML
INJECTION, SOLUTION INTRAVENOUS CONTINUOUS
Status: DISCONTINUED | OUTPATIENT
Start: 2024-03-03 | End: 2024-03-04 | Stop reason: HOSPADM

## 2024-03-03 RX ORDER — CHOLECALCIFEROL (VITAMIN D3) 125 MCG
5000 CAPSULE ORAL DAILY
Status: DISCONTINUED | OUTPATIENT
Start: 2024-03-03 | End: 2024-03-03 | Stop reason: SDUPTHER

## 2024-03-03 RX ORDER — MAGNESIUM SULFATE HEPTAHYDRATE 40 MG/ML
4 INJECTION, SOLUTION INTRAVENOUS
Status: DISCONTINUED | OUTPATIENT
Start: 2024-03-03 | End: 2024-03-03

## 2024-03-03 RX ORDER — SUCRALFATE 1 G/10ML
1 SUSPENSION ORAL EVERY 6 HOURS
Status: DISCONTINUED | OUTPATIENT
Start: 2024-03-03 | End: 2024-03-03

## 2024-03-03 RX ORDER — POTASSIUM CHLORIDE 29.8 MG/ML
40 INJECTION INTRAVENOUS
Status: DISCONTINUED | OUTPATIENT
Start: 2024-03-03 | End: 2024-03-03

## 2024-03-03 RX ORDER — MAGNESIUM SULFATE 1 G/100ML
1 INJECTION INTRAVENOUS ONCE
Status: COMPLETED | OUTPATIENT
Start: 2024-03-03 | End: 2024-03-03

## 2024-03-03 RX ORDER — HYDRALAZINE HYDROCHLORIDE 20 MG/ML
10 INJECTION INTRAMUSCULAR; INTRAVENOUS EVERY 6 HOURS PRN
Status: DISCONTINUED | OUTPATIENT
Start: 2024-03-03 | End: 2024-03-04 | Stop reason: HOSPADM

## 2024-03-03 RX ORDER — LANOLIN ALCOHOL/MO/W.PET/CERES
400 CREAM (GRAM) TOPICAL DAILY
Status: DISCONTINUED | OUTPATIENT
Start: 2024-03-03 | End: 2024-03-04 | Stop reason: HOSPADM

## 2024-03-03 RX ORDER — ALUMINUM HYDROXIDE, MAGNESIUM HYDROXIDE, AND SIMETHICONE 1200; 120; 1200 MG/30ML; MG/30ML; MG/30ML
30 SUSPENSION ORAL
Status: DISCONTINUED | OUTPATIENT
Start: 2024-03-03 | End: 2024-03-03

## 2024-03-03 RX ORDER — POTASSIUM CHLORIDE 29.8 MG/ML
80 INJECTION INTRAVENOUS
Status: DISCONTINUED | OUTPATIENT
Start: 2024-03-03 | End: 2024-03-03

## 2024-03-03 RX ORDER — OXYBUTYNIN CHLORIDE 5 MG/1
5 TABLET, EXTENDED RELEASE ORAL DAILY
Status: DISCONTINUED | OUTPATIENT
Start: 2024-03-03 | End: 2024-03-04 | Stop reason: HOSPADM

## 2024-03-03 RX ORDER — POTASSIUM CHLORIDE 7.45 MG/ML
10 INJECTION INTRAVENOUS ONCE
Status: DISCONTINUED | OUTPATIENT
Start: 2024-03-03 | End: 2024-03-03

## 2024-03-03 RX ORDER — ACETAMINOPHEN 325 MG/1
650 TABLET ORAL EVERY 6 HOURS PRN
Status: DISCONTINUED | OUTPATIENT
Start: 2024-03-03 | End: 2024-03-04 | Stop reason: HOSPADM

## 2024-03-03 RX ORDER — ACETAMINOPHEN 500 MG
5000 TABLET ORAL DAILY
Status: DISCONTINUED | OUTPATIENT
Start: 2024-03-03 | End: 2024-03-04 | Stop reason: HOSPADM

## 2024-03-03 RX ORDER — POTASSIUM CHLORIDE 14.9 MG/ML
60 INJECTION INTRAVENOUS
Status: DISCONTINUED | OUTPATIENT
Start: 2024-03-03 | End: 2024-03-03

## 2024-03-03 RX ORDER — IPRATROPIUM BROMIDE AND ALBUTEROL SULFATE 2.5; .5 MG/3ML; MG/3ML
3 SOLUTION RESPIRATORY (INHALATION) EVERY 6 HOURS PRN
Status: DISCONTINUED | OUTPATIENT
Start: 2024-03-03 | End: 2024-03-04 | Stop reason: HOSPADM

## 2024-03-03 RX ADMIN — POTASSIUM CHLORIDE 40 MEQ: 29.8 INJECTION, SOLUTION INTRAVENOUS at 07:03

## 2024-03-03 RX ADMIN — CHOLECALCIFEROL TAB 125 MCG (5000 UNIT) 5000 UNITS: 125 TAB at 08:03

## 2024-03-03 RX ADMIN — POTASSIUM CHLORIDE 10 MEQ: 7.46 INJECTION, SOLUTION INTRAVENOUS at 01:03

## 2024-03-03 RX ADMIN — POTASSIUM BICARBONATE 25 MEQ: 978 TABLET, EFFERVESCENT ORAL at 12:03

## 2024-03-03 RX ADMIN — SODIUM CHLORIDE: 4.5 INJECTION, SOLUTION INTRAVENOUS at 05:03

## 2024-03-03 RX ADMIN — AMLODIPINE BESYLATE 10 MG: 10 TABLET ORAL at 08:03

## 2024-03-03 RX ADMIN — CILOSTAZOL 50 MG: 50 TABLET ORAL at 09:03

## 2024-03-03 RX ADMIN — POTASSIUM PHOSPHATE, MONOBASIC POTASSIUM PHOSPHATE, DIBASIC 15 MMOL: 224; 236 INJECTION, SOLUTION, CONCENTRATE INTRAVENOUS at 09:03

## 2024-03-03 RX ADMIN — POTASSIUM CHLORIDE 80 MEQ: 29.8 INJECTION, SOLUTION INTRAVENOUS at 05:03

## 2024-03-03 RX ADMIN — POTASSIUM BICARBONATE 25 MEQ: 978 TABLET, EFFERVESCENT ORAL at 05:03

## 2024-03-03 RX ADMIN — POTASSIUM CHLORIDE 40 MEQ: 1500 TABLET, EXTENDED RELEASE ORAL at 07:03

## 2024-03-03 RX ADMIN — MAGNESIUM SULFATE HEPTAHYDRATE 1 G: 1 INJECTION, SOLUTION INTRAVENOUS at 02:03

## 2024-03-03 RX ADMIN — INSULIN ASPART 1 UNITS: 100 INJECTION, SOLUTION INTRAVENOUS; SUBCUTANEOUS at 09:03

## 2024-03-03 RX ADMIN — HYDRALAZINE HYDROCHLORIDE 10 MG: 20 INJECTION, SOLUTION INTRAMUSCULAR; INTRAVENOUS at 04:03

## 2024-03-03 RX ADMIN — INSULIN ASPART 6 UNITS: 100 INJECTION, SOLUTION INTRAVENOUS; SUBCUTANEOUS at 12:03

## 2024-03-03 RX ADMIN — SODIUM CHLORIDE: 4.5 INJECTION, SOLUTION INTRAVENOUS at 07:03

## 2024-03-03 RX ADMIN — OXYBUTYNIN CHLORIDE 5 MG: 5 TABLET, EXTENDED RELEASE ORAL at 08:03

## 2024-03-03 RX ADMIN — FAMOTIDINE 20 MG: 20 TABLET ORAL at 08:03

## 2024-03-03 RX ADMIN — Medication 400 MG: at 10:03

## 2024-03-03 RX ADMIN — POTASSIUM PHOSPHATE, MONOBASIC POTASSIUM PHOSPHATE, DIBASIC 15 MMOL: 224; 236 INJECTION, SOLUTION, CONCENTRATE INTRAVENOUS at 10:03

## 2024-03-03 RX ADMIN — CILOSTAZOL 50 MG: 50 TABLET ORAL at 08:03

## 2024-03-03 RX ADMIN — INSULIN ASPART 2 UNITS: 100 INJECTION, SOLUTION INTRAVENOUS; SUBCUTANEOUS at 04:03

## 2024-03-03 RX ADMIN — LABETALOL HYDROCHLORIDE 300 MG: 100 TABLET, FILM COATED ORAL at 09:03

## 2024-03-03 RX ADMIN — LABETALOL HYDROCHLORIDE 300 MG: 100 TABLET, FILM COATED ORAL at 08:03

## 2024-03-03 RX ADMIN — INSULIN ASPART 4 UNITS: 100 INJECTION, SOLUTION INTRAVENOUS; SUBCUTANEOUS at 08:03

## 2024-03-03 NOTE — ED PROVIDER NOTES
SCRIBE #1 NOTE: IChika am scribing for, and in the presence of, Bob Randolph Jr., MD. I have scribed the HPI, ROS, and PEx.     SCRIBE #2 NOTE: I, Dayanara Nogueira, devon scribing for, and in the presence of,  David Quintero MD. I have scribed the remaining portions of the note not scribed by Scribe #1.      History     Chief Complaint   Patient presents with    abnormal labs     Pt send by dr office for abnormal labs, potassium 2.3. sending for repeat potassium and EKG     Review of patient's allergies indicates:  No Known Allergies      History of Present Illness     HPI    3/2/2024, 6:56 PM  History obtained from the patient      History of Present Illness: Maryjane Mcintyre is a 73 y.o. female patient with a PMHx of HTN, DM, HLD, and PAD who presents to the Emergency Department for evaluation of hypokalemia found at routine checkup labs. Pt was instructed to come to ED for potassium of 2.3. Pt is asymptomatic. Pt is not currently taking lasix; she is on hydrochlorothiazide. No further complaints or concerns at this time.       Arrival mode: Personal vehicle     PCP: Angeilta Dorado DO        Past Medical History:  Past Medical History:   Diagnosis Date    Arthritis     Diabetes mellitus, type 2     GERD (gastroesophageal reflux disease)     Gout     Hyperlipidemia     Hypertension     Onychomycosis     PAD (peripheral artery disease)        Past Surgical History:  Past Surgical History:   Procedure Laterality Date    Cardiac Echo      COLONOSCOPY N/A 10/19/2020    Procedure: COLONOSCOPY;  Surgeon: Yousuf Fuller MD;  Location: [facility];  Service: Endoscopy;  Laterality: N/A;    ESOPHAGOGASTRODUODENOSCOPY N/A 10/19/2020    Procedure: ESOPHAGOGASTRODUODENOSCOPY (EGD);  Surgeon: Yousuf Fuller MD;  Location: [facility];  Service: Endoscopy;  Laterality: N/A;    GALLBLADDER SURGERY      HYSTERECTOMY           Family History:  Family History   Problem Relation Age of Onset    Diabetes Father      Hypertension Father     Heart attack Sister     Colon cancer Sister     Stroke Sister     Colon cancer Brother        Social History:  Social History     Tobacco Use    Smoking status: Former     Current packs/day: 1.00     Types: Cigarettes    Smokeless tobacco: Never    Tobacco comments:     Quit 2019 mid year   Substance and Sexual Activity    Alcohol use: No    Drug use: Never    Sexual activity: Not Currently        Review of Systems     Review of Systems   Constitutional: Negative.    HENT: Negative.     Eyes: Negative.    Respiratory: Negative.     Cardiovascular: Negative.    Gastrointestinal: Negative.    Endocrine: Negative.    Genitourinary: Negative.    Musculoskeletal: Negative.    Skin: Negative.    Allergic/Immunologic: Negative.    Neurological: Negative.    Hematological: Negative.    Psychiatric/Behavioral: Negative.     All other systems reviewed and are negative.       Physical Exam     Initial Vitals [03/02/24 1808]   BP Pulse Resp Temp SpO2   (!) 114/55 81 16 98 °F (36.7 °C) 99 %      MAP       --          Physical Exam  Nursing Notes and Vital Signs Reviewed.  Constitutional: Patient is in no acute distress. Well-developed and well-nourished.  Head: Atraumatic. Normocephalic.  Eyes:  EOM intact.  No scleral icterus.  ENT: Mucous membranes are moist.  Nares clear   Neck:  Full ROM. No JVD.  Cardiovascular: Regular rate. Regular rhythm No murmurs, rubs, or gallops. Distal pulses are 2+ and symmetric  Pulmonary/Chest: No respiratory distress. Clear to auscultation bilaterally. No wheezing or rales.  Equal chest wall rise bilaterally  Abdominal: Soft and non-distended.  There is no tenderness.  No rebound, guarding, or rigidity. Good bowel sounds.  Genitourinary: No CVA tenderness.  No suprapubic tenderness  Musculoskeletal: Moves all extremities. No obvious deformities.  5 x 5 strength in all extremities   Skin: Warm and dry.  Neurological:  Alert, awake, and appropriate.  Normal speech.  No  "acute focal neurological deficits are appreciated.  Two through 12 intact bilaterally.  Psychiatric: Normal affect. Good eye contact. Appropriate in content.     ED Course   Critical Care    Date/Time: 3/2/2024 11:36 PM    Performed by: David Quintero MD  Authorized by: David Quintero MD  Direct patient critical care time: 15 minutes  Additional history critical care time: 10 minutes  Ordering / reviewing critical care time: 14 minutes  Documentation critical care time: 5 minutes  Consulting other physicians critical care time: 15 minutes  Total critical care time (exclusive of procedural time) : 59 minutes  Critical care time was exclusive of separately billable procedures and treating other patients and teaching time.  Critical care was necessary to treat or prevent imminent or life-threatening deterioration of the following conditions: hypokalemia.  Critical care was time spent personally by me on the following activities: blood draw for specimens, development of treatment plan with patient or surrogate, discussions with consultants, interpretation of cardiac output measurements, evaluation of patient's response to treatment, examination of patient, obtaining history from patient or surrogate, ordering and review of radiographic studies, ordering and review of laboratory studies, ordering and performing treatments and interventions, pulse oximetry, re-evaluation of patient's condition and review of old charts.        ED Vital Signs:  Vitals:    03/02/24 1808 03/02/24 1937 03/02/24 1948 03/02/24 2002   BP: (!) 114/55  (!) 111/55 (!) 125/59   Pulse: 81  75 80   Resp: 16  16 20   Temp: 98 °F (36.7 °C)      TempSrc:       SpO2: 99%  99% 99%   Weight:  70.8 kg (156 lb)     Height: 5' 4" (1.626 m)       03/02/24 2102 03/02/24 2302 03/03/24 0045 03/03/24 0047   BP: (!) 143/69 134/64 (!) 173/79    Pulse: 73 78 79    Resp: 15  18    Temp:   98.4 °F (36.9 °C)    TempSrc:   Oral    SpO2: 99%  97%    Weight:    66 kg (145 " "lb 8.1 oz)   Height:        03/03/24 0050 03/03/24 0100 03/03/24 0115 03/03/24 0200   BP: (!) 173/79 (!) 150/67  (!) 154/74   Pulse: 77 77 79 77   Resp: 10 11 (!) 21 19   Temp:       TempSrc:       SpO2: 98% 97% 98% 99%   Weight: 66 kg (145 lb 8.1 oz)      Height: 5' 4" (1.626 m)       03/03/24 0300   BP: (!) 170/76   Pulse: 83   Resp: 17   Temp:    TempSrc:    SpO2: (!) 93%   Weight:    Height:        Abnormal Lab Results:  Labs Reviewed   CBC W/ AUTO DIFFERENTIAL - Abnormal; Notable for the following components:       Result Value    RBC 3.54 (*)     Hemoglobin 9.3 (*)     Hematocrit 28.5 (*)     MCV 81 (*)     MCH 26.3 (*)     RDW 14.9 (*)     All other components within normal limits   BASIC METABOLIC PANEL - Abnormal; Notable for the following components:    Potassium 2.3 (*)     CO2 20 (*)     Glucose 225 (*)     Creatinine 2.1 (*)     eGFR 25 (*)     All other components within normal limits    Narrative:     potassium critical result(s) called and verbal readback obtained from   jefry cee rn by KATVivian 03/02/2024 19:18   MAGNESIUM - Abnormal; Notable for the following components:    Magnesium 1.4 (*)     All other components within normal limits   BASIC METABOLIC PANEL - Abnormal; Notable for the following components:    Potassium 2.3 (*)     CO2 22 (*)     Glucose 173 (*)     Creatinine 1.9 (*)     eGFR 28 (*)     All other components within normal limits    Narrative:     potassium critical result(s) called and verbal readback obtained from   mary rollins rn by KATVivain 03/02/2024 22:45   POCT GLUCOSE MONITORING CONTINUOUS        All Lab Results:  Results for orders placed or performed during the hospital encounter of 03/02/24   CBC auto differential   Result Value Ref Range    WBC 5.00 3.90 - 12.70 K/uL    RBC 3.54 (L) 4.00 - 5.40 M/uL    Hemoglobin 9.3 (L) 12.0 - 16.0 g/dL    Hematocrit 28.5 (L) 37.0 - 48.5 %    MCV 81 (L) 82 - 98 fL    MCH 26.3 (L) 27.0 - 31.0 pg    MCHC 32.6 32.0 - 36.0 g/dL    RDW 14.9 " (H) 11.5 - 14.5 %    Platelets 203 150 - 450 K/uL    MPV 10.8 9.2 - 12.9 fL    Immature Granulocytes 0.2 0.0 - 0.5 %    Gran # (ANC) 2.5 1.8 - 7.7 K/uL    Immature Grans (Abs) 0.01 0.00 - 0.04 K/uL    Lymph # 1.6 1.0 - 4.8 K/uL    Mono # 0.5 0.3 - 1.0 K/uL    Eos # 0.3 0.0 - 0.5 K/uL    Baso # 0.04 0.00 - 0.20 K/uL    nRBC 0 0 /100 WBC    Gran % 50.4 38.0 - 73.0 %    Lymph % 32.8 18.0 - 48.0 %    Mono % 10.8 4.0 - 15.0 %    Eosinophil % 5.0 0.0 - 8.0 %    Basophil % 0.8 0.0 - 1.9 %    Differential Method Automated    Basic metabolic panel   Result Value Ref Range    Sodium 141 136 - 145 mmol/L    Potassium 2.3 (LL) 3.5 - 5.1 mmol/L    Chloride 107 95 - 110 mmol/L    CO2 20 (L) 23 - 29 mmol/L    Glucose 225 (H) 70 - 110 mg/dL    BUN 17 8 - 23 mg/dL    Creatinine 2.1 (H) 0.5 - 1.4 mg/dL    Calcium 8.9 8.7 - 10.5 mg/dL    Anion Gap 14 8 - 16 mmol/L    eGFR 25 (A) >60 mL/min/1.73 m^2   Magnesium   Result Value Ref Range    Magnesium 1.4 (L) 1.6 - 2.6 mg/dL   Basic metabolic panel   Result Value Ref Range    Sodium 142 136 - 145 mmol/L    Potassium 2.3 (LL) 3.5 - 5.1 mmol/L    Chloride 110 95 - 110 mmol/L    CO2 22 (L) 23 - 29 mmol/L    Glucose 173 (H) 70 - 110 mg/dL    BUN 16 8 - 23 mg/dL    Creatinine 1.9 (H) 0.5 - 1.4 mg/dL    Calcium 8.7 8.7 - 10.5 mg/dL    Anion Gap 10 8 - 16 mmol/L    eGFR 28 (A) >60 mL/min/1.73 m^2   Basic metabolic panel   Result Value Ref Range    Sodium 143 136 - 145 mmol/L    Potassium 2.8 (L) 3.5 - 5.1 mmol/L    Chloride 111 (H) 95 - 110 mmol/L    CO2 22 (L) 23 - 29 mmol/L    Glucose 171 (H) 70 - 110 mg/dL    BUN 16 8 - 23 mg/dL    Creatinine 1.9 (H) 0.5 - 1.4 mg/dL    Calcium 8.9 8.7 - 10.5 mg/dL    Anion Gap 10 8 - 16 mmol/L    eGFR 28 (A) >60 mL/min/1.73 m^2   Magnesium   Result Value Ref Range    Magnesium 2.2 1.6 - 2.6 mg/dL   POCT glucose   Result Value Ref Range    POCT Glucose 172 (H) 70 - 110 mg/dL       Imaging Results:  Imaging Results              X-Ray Chest 1 View (Final result)   Result time 03/03/24 00:35:03      Final result by Jani Nelson MD (03/03/24 00:35:03)                   Impression:      As above.      Electronically signed by: Jani Nelson  Date:    03/03/2024  Time:    00:35               Narrative:    EXAMINATION:  XR CHEST 1 VIEW    CLINICAL HISTORY:  history of diastolic heart failure;    TECHNIQUE:  Single frontal view of the chest was performed.    COMPARISON:  None    FINDINGS:  Mildly enlarged cardiac silhouette with pulmonary vascular congestion.  Mild diffuse interstitial prominence compatible with edema and/or pneumonitis.                                       The EKG was ordered, reviewed, and independently interpreted by the ED provider.  Interpretation time: 18:08  Rate: 79 BPM  Rhythm: sinus rhythm with 1st AV block  Interpretation: Nonspecific T wave abnormality. Prolonged QT. Abnormal EKG. No STEMI.    The EKG was ordered, reviewed, and independently interpreted by the ED provider.  Interpretation time: 23:18  Rate: 80 BPM  Rhythm:  Sinus rhythm with 1st degree AV block  Interpretation: Left bundle branch block. Abnormal ECG. No STEMI.             The Emergency Provider reviewed the vital signs and test results, which are outlined above.     ED Discussion     8:00 PM: Dr. Randolph transfers care of patient to Dr. Quintero pending lab results.    11:12 PM: Discussed pt's case with Dr. Smith (Hospital Medicine) who recommends more IV KCL and ICU admission.     11:14 PM: Discussed pt's case with Katy Ramos NP (ICU) who will order potassium bicarb PO. She also recommends a PICC line and repeat potassium level after pt is given oral potassium bicarb.     11:33 PM: Discussed pt's case with Katy Ramos NP (ICU) who recommends admitting pt to ICU if pt is having significant rhythm disturbance.    11:43 PM: Discussed case with Katy Ramos NP (ICU). Katy Ramos NP agrees with current care and management of pt and accepts admission.    Admitting Service: Critical Care  Admitting Physician: Dr. Patton  Admit to: ICU       ED Course as of 03/03/24 0403   Sat Mar 02, 2024   1950 Potassium(!!): 2.3 [RT]      ED Course User Index  [RT] Bob Randolph Jr., MD     Medical Decision Making  Differential diagnosis includes hypokalemia, hypomagnesemia, arrhythmia.    Amount and/or Complexity of Data Reviewed  Labs: ordered. Decision-making details documented in ED Course.  ECG/medicine tests: ordered and independent interpretation performed. Decision-making details documented in ED Course.    Risk  Prescription drug management.  Decision regarding hospitalization.                ED Medication(s):  Medications   potassium chloride SA CR tablet 10 mEq (10 mEq Oral Not Given 3/2/24 2000)   sodium chloride 0.9% flush 10 mL (has no administration in time range)   glucose chewable tablet 16 g (has no administration in time range)   glucose chewable tablet 24 g (has no administration in time range)   glucagon (human recombinant) injection 1 mg (has no administration in time range)   insulin aspart U-100 pen 0-10 Units (has no administration in time range)   dextrose 10% bolus 125 mL 125 mL (has no administration in time range)   dextrose 10% bolus 250 mL 250 mL (has no administration in time range)   influenza 65up-adj (QUADRIVALENT ADJUVANTED PF) vaccine 0.5 mL (has no administration in time range)   hydrALAZINE injection 10 mg (has no administration in time range)   labetaloL tablet 300 mg (has no administration in time range)   oxybutynin 24 hr tablet 5 mg (has no administration in time range)   cilostazoL tablet 50 mg (has no administration in time range)   amLODIPine tablet 10 mg (has no administration in time range)   acetaminophen tablet 650 mg (has no administration in time range)   famotidine tablet 20 mg (has no administration in time range)   albuterol-ipratropium 2.5 mg-0.5 mg/3 mL nebulizer solution 3 mL (has no administration in time range)    cholecalciferol (vitamin D3) 125 mcg (5,000 unit) tablet 5,000 Units (has no administration in time range)   potassium chloride 40 mEq in 100 mL IVPB (FOR CENTRAL LINE ADMINISTRATION ONLY) (has no administration in time range)     And   potassium chloride 20 mEq in 100 mL IVPB (FOR CENTRAL LINE ADMINISTRATION ONLY) (has no administration in time range)     And   potassium chloride 40 mEq in 100 mL IVPB (FOR CENTRAL LINE ADMINISTRATION ONLY) (has no administration in time range)   magnesium sulfate 2g in water 50mL IVPB (premix) (has no administration in time range)   magnesium sulfate 2g in water 50mL IVPB (premix) (has no administration in time range)   potassium chloride 10 mEq in 100 mL IVPB (0 mEq Intravenous Stopped 3/2/24 2154)   magnesium sulfate 2g in water 50mL IVPB (premix) (0 g Intravenous Stopped 3/2/24 2308)   sodium chloride 0.9% bolus 1,000 mL 1,000 mL (0 mLs Intravenous Stopped 3/2/24 2210)   potassium chloride 10 mEq in 100 mL IVPB (10 mEq Intravenous New Bag 3/3/24 0159)   potassium bicarbonate disintegrating tablet 25 mEq (25 mEq Oral Given 3/3/24 0003)   magnesium sulfate in dextrose IVPB (premix) 1 g (0 g Intravenous Stopped 3/3/24 0300)       Current Discharge Medication List                  Scribe Attestation:   Scribe #1: I performed the above scribed service and the documentation accurately describes the services I performed. I attest to the accuracy of the note.     Attending:   Physician Attestation Statement for Scribe #1: I, Bob Randolph Jr., MD, personally performed the services described in this documentation, as scribed by Chika Mixon, in my presence, and it is both accurate and complete.       Scribe Attestation:   Scribe #2: I performed the above scribed service and the documentation accurately describes the services I performed. I attest to the accuracy of the note.    Attending Attestation:           Physician Attestation for Scribe:    Physician Attestation Statement  for Scribe #2: I, David Quintero MD, reviewed documentation, as scribed by Dayanara Nogueira in my presence, and it is both accurate and complete. I also acknowledge and confirm the content of the note done by Scribe #1.           Clinical Impression       ICD-10-CM ICD-9-CM   1. Abnormal EKG  R94.31 794.31   2. Chest pain  R07.9 786.50   3. Hypokalemia  E87.6 276.8   4. Chronic diastolic congestive heart failure  I50.32 428.32     428.0       Disposition:   Disposition: Admitted  Condition: Serious         David Quintero MD  03/03/24 3415

## 2024-03-03 NOTE — H&P
[facility] ([facility])  Critical Care Medicine  History & Physical    Patient Name: Maryjane Mcintyre  MRN: [id number]  Admission Date: 3/2/2024  Hospital Length of Stay: 1 days  Code Status: Full Code  Attending Physician: Ry Patton MD   Primary Care Provider: Angelita Dorado DO   Principal Problem: Hypokalemia    Subjective:     HPI:  Maryjane Mcintyre is a 73 year old female with past medical history significant for hypertension, hyperlipidemia, diabetes, PAD, gout, arthritis and GERD who presented to ED per instructions of her PCP after her annual physical/lab results demonstrated hypokalemia, K 2.2.  She has no complaints, denies chest pain, palpitations, weakness, dizziness, cough, fever, abdominal pain, nausea, vomiting, dysuria. She does tell me that she has been having diarrhea daily for about one week. Repeat K 2.3. ROSA also noted. While in ED, she was given 1 L NS fluid bolus, oral extended release potassium 40 mEq and 20 mEq IV potassium. Magnesium is low as well, 1.4 and was supplemented. Potassium level on recheck was still 2.3.  EKG was done and there are some changes including left BBB, first deg AVB, and prolonged QTC. Patient has been asymptomatic. Vital signs are stable.    Hospital/ICU Course:  No notes on file     Past Medical History:   Diagnosis Date    Arthritis     Diabetes mellitus, type 2     GERD (gastroesophageal reflux disease)     Gout     Hyperlipidemia     Hypertension     Onychomycosis     PAD (peripheral artery disease)        Past Surgical History:   Procedure Laterality Date    Cardiac Echo      COLONOSCOPY N/A 10/19/2020    Procedure: COLONOSCOPY;  Surgeon: Yousuf Fuller MD;  Location: [facility];  Service: Endoscopy;  Laterality: N/A;    ESOPHAGOGASTRODUODENOSCOPY N/A 10/19/2020    Procedure: ESOPHAGOGASTRODUODENOSCOPY (EGD);  Surgeon: Yousuf Fuller MD;  Location: [facility];  Service: Endoscopy;  Laterality: N/A;    GALLBLADDER SURGERY      HYSTERECTOMY          Review of patient's allergies indicates:  No Known Allergies    Family History       Problem Relation (Age of Onset)    Colon cancer Sister, Brother    Diabetes Father    Heart attack Sister    Hypertension Father    Stroke Sister          Tobacco Use    Smoking status: Former     Current packs/day: 1.00     Types: Cigarettes    Smokeless tobacco: Never    Tobacco comments:     Quit 2019 mid year   Substance and Sexual Activity    Alcohol use: No    Drug use: Never    Sexual activity: Not Currently         Review of Systems   Constitutional: Negative.  Negative for activity change, appetite change, chills, diaphoresis, fatigue and fever.   HENT: Negative.     Eyes: Negative.    Respiratory: Negative.  Negative for cough, chest tightness, shortness of breath and wheezing.    Cardiovascular: Negative.  Negative for chest pain, palpitations and leg swelling.   Gastrointestinal:  Positive for diarrhea. Negative for abdominal distention, abdominal pain, blood in stool, constipation, nausea and vomiting.        Diarrhea x one week   Endocrine: Negative.    Genitourinary: Negative.  Negative for dysuria and urgency.   Musculoskeletal:  Positive for arthralgias.   Skin: Negative.    Allergic/Immunologic: Negative.    Neurological: Negative.  Negative for dizziness, seizures, syncope, weakness, light-headedness and headaches.   Hematological: Negative.    Psychiatric/Behavioral: Negative.       Objective:     Vital Signs (Most Recent):  Temp: 98.4 °F (36.9 °C) (03/03/24 0045)  Pulse: 77 (03/03/24 0200)  Resp: 19 (03/03/24 0200)  BP: (!) 154/74 (03/03/24 0200)  SpO2: 99 % (03/03/24 0200) Vital Signs (24h Range):  Temp:  [98 °F (36.7 °C)-98.4 °F (36.9 °C)] 98.4 °F (36.9 °C)  Pulse:  [73-81] 77  Resp:  [10-21] 19  SpO2:  [97 %-99 %] 99 %  BP: (111-173)/(55-79) 154/74     Weight: 66 kg (145 lb 8.1 oz)  Body mass index is 24.98 kg/m².      Intake/Output Summary (Last 24 hours) at 3/3/2024 0234  Last data filed at 3/3/2024  0200  Gross per 24 hour   Intake 1330.57 ml   Output 600 ml   Net 730.57 ml        Physical Exam  Vitals reviewed.   Constitutional:       General: She is not in acute distress.     Appearance: Normal appearance. She is not ill-appearing, toxic-appearing or diaphoretic.   HENT:      Head: Normocephalic and atraumatic.      Nose: Nose normal.      Mouth/Throat:      Mouth: Mucous membranes are moist.      Pharynx: Oropharynx is clear.   Eyes:      Extraocular Movements: Extraocular movements intact.      Pupils: Pupils are equal, round, and reactive to light.   Cardiovascular:      Rate and Rhythm: Normal rate and regular rhythm.      Heart sounds: Murmur heard.   Pulmonary:      Effort: Pulmonary effort is normal. No respiratory distress.      Breath sounds: Normal breath sounds. No wheezing or rales.   Abdominal:      General: Bowel sounds are normal. There is no distension.      Palpations: Abdomen is soft.      Tenderness: There is no abdominal tenderness. There is no guarding.   Genitourinary:     Comments: deferred  Musculoskeletal:      Cervical back: Neck supple.      Right lower leg: No edema.      Left lower leg: No edema.   Skin:     General: Skin is warm and dry.      Capillary Refill: Capillary refill takes less than 2 seconds.   Neurological:      General: No focal deficit present.      Mental Status: She is alert and oriented to person, place, and time.   Psychiatric:         Mood and Affect: Mood normal.         Behavior: Behavior normal.          Vents: N/A       Lines/Drains/Airways       Peripherally Inserted Central Catheter Line  Duration             PICC Double Lumen 03/03/24 0120 right basilic <1 day              Drain  Duration             Female External Urinary Catheter w/ Suction 03/02/24 0699 <1 day              Peripheral Intravenous Line  Duration                  Peripheral IV - Single Lumen 03/02/24 2000 Left Antecubital <1 day                    Significant Labs:    CBC/Anemia  Profile:  Recent Labs   Lab 03/02/24  1839   WBC 5.00   HGB 9.3*   HCT 28.5*      MCV 81*   RDW 14.9*        Chemistries:  Recent Labs   Lab 03/02/24  0846 03/02/24  1839 03/02/24 1959 03/02/24  2217    141  --  142   K 2.2* 2.3*  --  2.3*    107  --  110   CO2 24 20*  --  22*   BUN 15 17  --  16   CREATININE 1.8* 2.1*  --  1.9*   CALCIUM 9.4 8.9  --  8.7   ALBUMIN 3.6  --   --   --    PROT 6.7  --   --   --    BILITOT 0.5  --   --   --    ALKPHOS 42*  --   --   --    ALT 56*  --   --   --    AST 49*  --   --   --    MG  --   --  1.4*  --        All pertinent labs within the past 24 hours have been reviewed.    Significant Imaging:   I have reviewed all pertinent imaging results/findings within the past 24 hours.  CXR ordered  Assessment/Plan:     Renal/  * Hypokalemia  Potassium level checked during routine annual physical with PCP, called to go to ED due to potassium level 2.2, repeated and was 2.3  Likely due to diarrhea for the past week, and on diuretic/ARB --holding home medications for now  Slow to respond to interventions per ED and having some EKG changes, asymptomatic  Cardiac monitoring, repeat EKG in AM   Potassium bicarb ordered (rather than extended release preparation)  PICC placed to facilitate more aggressive repletion  Continue potassium and magnesium replacement  Monitor BMP every 4 hours for now    ROSA (acute kidney injury)  Patient with acute kidney injury/acute renal failure likely due to pre-renal azotemia due to IVVD in the setting of diarrhea while taking diuretic/ARB ROSA is currently improving. Baseline creatinine  1.5  - Labs reviewed- Renal function/electrolytes with Estimated Creatinine Clearance: 25 mL/min (A) (based on SCr of 1.9 mg/dL (H)). according to latest data. Monitor urine output and serial BMP and adjust therapy as needed. Avoid nephrotoxins and renally dose meds for GFR listed above.    Likely due to IVVD in setting of diarrhea x one week while taking  diuretic (HCTZ) and ARB  She was given fluid bolus x 1 L per ED  Serial BMP ordered, monitor creatinine and urine output      Hypomagnesemia  Patient has Abnormal Magnesium: hypomagnesemia. Will continue to monitor electrolytes closely. Will replace the affected electrolytes and repeat labs to be done after interventions completed. The patient's magnesium results have been reviewed and are listed below.    Magnesium level 1.4 -- supplementation in progress  Recheck after repletion    Endocrine  Uncontrolled type 2 diabetes mellitus with hyperglycemia, with long-term current use of insulin  Patient's FSGs are uncontrolled due to hyperglycemia on current medication regimen.  Last A1c reviewed-   Lab Results   Component Value Date    HGBA1C 9.9 (H) 03/02/2024     Most recent fingerstick glucose reviewed-   Recent Labs   Lab 03/03/24  0106   POCTGLUCOSE 172*     Current correctional scale  Medium  Maintain anti-hyperglycemic dose as follows-   Antihyperglycemics (From admission, onward)      Start     Stop Route Frequency Ordered    03/03/24 0040  insulin aspart U-100 pen 0-10 Units         -- SubQ Before meals & nightly PRN 03/02/24 2341          Hold Oral hypoglycemics while patient is in the hospital    GI  Diarrhea of presumed infectious origin  Reports diarrhea for the past week, last BM yesterday  Check stool studies  Has been given one liter of IV fluids while in ED      Critical Care Time: 63 minutes  Critical secondary to hypokalemia with EKG changes    Critical care was time spent personally by me on the following activities: development of treatment plan with patient or surrogate and bedside caregivers, discussions with consultants, evaluation of patient's response to treatment, examination of patient, ordering and performing treatments and interventions, ordering and review of laboratory studies, ordering and review of radiographic studies, pulse oximetry, re-evaluation of patient's condition. This critical  care time did not overlap with that of any other provider or involve time for any procedures.     Katy Ramos NP  Critical Care Medicine  [facility] ([facility])

## 2024-03-03 NOTE — CARE UPDATE
"HPI:  Maryjane Mcintyre, a 73-year-old woman with a history of multiple chronic conditions including hypertension, hyperlipidemia, diabetes, peripheral artery disease, gout, arthritis, and gastroesophageal reflux disease, was admitted to the emergency department  following her primary care physician's advice after discovering hypokalemia (potassium level at 2.2) during her annual physical examination. Interventions in the ED only minimally improved K levels, remaining at 2.3. Acute kidney injury was also observed on repeat labs. EKG revealed left bundle branch block, first-degree atrioventricular block, and prolonged QTc interval. Patient was admitted to the ICU for aggressive potassium replacement, intravenous fluids, and supportive care, including magnesium replacement.    By the following day, her condition had stabilized enough for her to be transferred to a telemetry unit, with her symptomatic hypokalemia improving from 2.2 to 3.2 after receiving significant potassium supplementation. Her overnight telemetry monitoring was stable, leading to a switch from potassium chloride to potassium phosphate for additional phosphate replacement while continuing magnesium supplementation. Throughout this period, she remained symptom-free and demonstrated improvement in her condition. Hospital medicine consulted for assumption of care.     /61   Pulse 88   Temp 98.4 °F (36.9 °C) (Oral)   Resp 12   Ht 5' 4" (1.626 m)   Wt 66 kg (145 lb 8.1 oz)   SpO2 98%   Breastfeeding No   BMI 24.98 kg/m²   Vital signs stable.     PLAN:  -PT/OT consulted to assess gait stability  -continue to trend renal function  -rest of plan as stated in critical care H&P from today  -assume full care upon stepdown to the floor    Full note to follow.     Rihcard Vernon MD  Hospital Medicine    "

## 2024-03-03 NOTE — ASSESSMENT & PLAN NOTE
Patient's FSGs are uncontrolled due to hyperglycemia on current medication regimen.  Last A1c reviewed-   Lab Results   Component Value Date    HGBA1C 9.9 (H) 03/02/2024     Most recent fingerstick glucose reviewed-   Recent Labs   Lab 03/03/24  0106   POCTGLUCOSE 172*     Current correctional scale  Medium  Maintain anti-hyperglycemic dose as follows-   Antihyperglycemics (From admission, onward)      Start     Stop Route Frequency Ordered    03/03/24 0040  insulin aspart U-100 pen 0-10 Units         -- SubQ Before meals & nightly PRN 03/02/24 2341          Hold Oral hypoglycemics while patient is in the hospital

## 2024-03-03 NOTE — ASSESSMENT & PLAN NOTE
Patient with acute kidney injury/acute renal failure likely due to pre-renal azotemia due to IVVD in the setting of diarrhea while taking diuretic/ARB ROSA is currently improving. Baseline creatinine  1.5  - Labs reviewed- Renal function/electrolytes with Estimated Creatinine Clearance: 25 mL/min (A) (based on SCr of 1.9 mg/dL (H)). according to latest data. Monitor urine output and serial BMP and adjust therapy as needed. Avoid nephrotoxins and renally dose meds for GFR listed above.    Likely due to IVVD in setting of diarrhea x one week while taking diuretic (HCTZ) and ARB  She was given fluid bolus x 1 L per ED  Serial BMP ordered, monitor creatinine and urine output

## 2024-03-03 NOTE — PT/OT/SLP EVAL
Occupational Therapy Evaluation    Name: Maryjane Mcintyre  MRN: [id number]  Admitting Diagnosis: Hypokalemia  Recent Surgery: * No surgery found *      Recommendations:     Discharge Recommendations: Low Intensity Therapy  Level of Assistance Recommended: 24 hours light assistance  Discharge Equipment Recommendations: none  Barriers to discharge: None    Assessment:     Maryjane Mcintyre is a 73 y.o. female with a medical diagnosis of Hypokalemia. She presents with performance deficits affecting function including weakness, impaired endurance, impaired self care skills, impaired balance, impaired functional mobility, decreased safety awareness, impaired cardiopulmonary response to activity.     Rehab Prognosis: Good; patient would benefit from acute OT services to address these deficits and reach maximum level of function.    Plan:     Patient to be seen 2 x/week to address the above listed problems via self-care/home management, therapeutic activities, therapeutic exercises, neuromuscular re-education  Plan of Care Expires: 03/17/24  Plan of Care Reviewed with: patient    Subjective     Chief Complaint: decline in ADL status  Patient Comments/Goals: DC home at prior level of function.  Pain/Comfort:  Pain Rating 1: 0/10  Pain Rating Post-Intervention 1: 0/10    Patients cultural, spiritual, Jew conflicts given the current situation: no    Social History:  Living Environment: Patient lives with their daughter in a single story home with number of outside stair(s): 0 and number of inside stair(s): 0  Prior Level of Function: Prior to admission, patient requires assistance with ADLs including dressing and bathing  Roles and Routines: Patient was not driving and retired prior to admission.  Equipment Used at Home: walker, rolling  DME owned (not currently used): none  Assistance Upon Discharge: family    Objective:     Communicated with nurse prior to session. Patient found HOB elevated with telemetry, oxygen, blood  pressure cuff, peripheral IV upon OT entry to room.    General Precautions: Standard, fall   Orthopedic Precautions: N/A   Braces: N/A    Respiratory Status: Room air    Occupational Performance    Gait belt applied - Yes    Bed Mobility:   Rolling/Turning to Right with stand by assistance  Scooting anteriorly to EOB to have both feet planted on floor: stand by assistance  Supine to sit from right side of bed with stand by assistance    Functional Mobility/Transfers:  Sit <> Stand Transfer with contact guard assistance with hand-held assist  Bed <> Chair Transfer using Step Transfer technique with contact guard assistance with hand-held assist    Activities of Daily Living:  Grooming: set up assistance  Upper Body Dressing: set up assistance  Lower Body Dressing: minimum assistance  Toileting: minimum assistance    Cognitive/Visual Perceptual:  Cognitive/Psychosocial Skills:    -     Oriented to: Person, Place, Time, Situation  -     Follows Commands/attention: Follows multistep  commands  -     Safety awareness/insight to disability: intact  -     Mood/Affect/Coping skills/emotional control: Appropriate to situation  Visual/Perceptual:    -     Intact    Physical Exam:  Balance:    -     Sitting: supervision  -     Standing: contact guard assistance  Upper Extremity Range of Motion:     -       Right Upper Extremity: WNL  -       Left Upper Extremity: WNL  Upper Extremity Strength:    -       Right Upper Extremity: WNL  -       Left Upper Extremity: WNL    AMPAC 6 Click ADL:  AMPAC Total Score: 24    Treatment & Education:  Therapist provided facilitation and instruction of proper body mechanics, energy conservation, and fall prevention strategies during tasks listed above  Patient educated on role of OT, POC, and goals for therapy  Patient educated on importance of OOB activities with staff member assistance and sitting OOB majority of the day    Patient left HOB elevated with all lines intact and call button in  reach.    GOALS:   Multidisciplinary Problems       Occupational Therapy Goals          Problem: Occupational Therapy    Goal Priority Disciplines Outcome Interventions   Occupational Therapy Goal     OT, PT/OT     Description: Goals to be met by: 3/17/24     Patient will increase functional independence with ADLs by performing:    LE Dressing with Stand-by Assistance.  Toileting from bedside commode with Set-up Assistance for hygiene and clothing management.   Toilet transfer to bedside commode with Supervision.                         History:     Past Medical History:   Diagnosis Date    Arthritis     Diabetes mellitus, type 2     GERD (gastroesophageal reflux disease)     Gout     Hyperlipidemia     Hypertension     Onychomycosis     PAD (peripheral artery disease)          Past Surgical History:   Procedure Laterality Date    Cardiac Echo      COLONOSCOPY N/A 10/19/2020    Procedure: COLONOSCOPY;  Surgeon: Yousuf Fuller MD;  Location: [facility];  Service: Endoscopy;  Laterality: N/A;    ESOPHAGOGASTRODUODENOSCOPY N/A 10/19/2020    Procedure: ESOPHAGOGASTRODUODENOSCOPY (EGD);  Surgeon: Yousuf Fuller MD;  Location: [facility];  Service: Endoscopy;  Laterality: N/A;    GALLBLADDER SURGERY      HYSTERECTOMY         Time Tracking:     OT Date of Treatment: 03/03/24  OT Start Time: 1115  OT Stop Time: 1130  OT Total Time (min): 15 min    Billable Minutes: Evaluation 15    3/3/2024

## 2024-03-03 NOTE — ASSESSMENT & PLAN NOTE
Patient has Abnormal Magnesium: hypomagnesemia. Will continue to monitor electrolytes closely. Will replace the affected electrolytes and repeat labs to be done after interventions completed. The patient's magnesium results have been reviewed and are listed below.    Magnesium level 1.4 -- supplementation in progress  Recheck after repletion

## 2024-03-03 NOTE — PLAN OF CARE
[facility] (H[facility])  Initial Discharge Assessment       Primary Care Provider: Angelita Dorado DO    Admission Diagnosis: Hypokalemia [E87.6]  Abnormal EKG [R94.31]  Chest pain [R07.9]    Admission Date: 3/2/2024  Expected Discharge Date:     Transition of Care Barriers: None    [facility] / Plan: [employer] / Product Type: Medicare Advantage /     Extended Emergency Contact Information  Primary Emergency Contact: Taurus Joyce   [facility]  Home Phone: [phone]  Relation: Other  Secondary Emergency Contact: COCO MALDONADO  Mobile Phone: [phone]  Relation: Daughter  Preferred language: English   needed? No    Discharge Plan A: Home with family, Home Health  Discharge Plan B: Home with family, Home Health      [id number] - [place] - [address]  [address]  [place] [address]  Phone: [phone] Fax: [phone]    [id number] - [place] - [address]  [address]  [place] [address]  Phone: [phone] Fax: [phone]      Initial Assessment (most recent)       Adult Discharge Assessment - 03/03/24 1048          Discharge Assessment    Assessment Type Discharge Planning Assessment     Confirmed/corrected address, phone number and insurance Yes     Confirmed Demographics Correct on Facesheet     Source of Information family     If unable to respond/provide information was family/caregiver contacted? Yes     Contact Name/Number Coco Maldonado (Daughter) [phone]     People in Home child(adán), adult     Facility Arrived From: Home     Do you expect to return to your current living situation? Yes     Do you have help at home or someone to help you manage your care at home? Yes     Who are your caregiver(s) and their phone number(s)? Coco Maldonado (Daughter) [phone]     Prior to hospitilization cognitive status: Alert/Oriented      Current cognitive status: Alert/Oriented     Equipment Currently Used at Home walker, rolling     Readmission within 30 days? No     Patient currently being followed by outpatient case management? No     Do you currently have service(s) that help you manage your care at home? No     Do you take prescription medications? Yes     Do you have prescription coverage? Yes     Do you have any problems affording any of your prescribed medications? No     Is the patient taking medications as prescribed? yes     Who is going to help you get home at discharge? Coco Jeffries (Daughter) [phone]     How do you get to doctors appointments? family or friend will provide     Are you on dialysis? No     Do you take coumadin? No     Discharge Plan A Home with family;Home Health     Discharge Plan B Home with family;Home Health     DME Needed Upon Discharge  wheelchair     Discharge Plan discussed with: Adult children     Transition of Care Barriers None                        Pt's daughter stated she would like a sitter home with pt. SW provided information on Choice Waiver. Pt' daughter said they tried that in the past. Pt's daughter requested a wheel chair and Home Health services.

## 2024-03-03 NOTE — ASSESSMENT & PLAN NOTE
Reports diarrhea for the past week, last BM yesterday  Check stool studies  Has been given one liter of IV fluids while in ED

## 2024-03-03 NOTE — PROCEDURES
"Maryjane Mcintyre is a 73 y.o. female patient.    Temp: 98.4 °F (36.9 °C) (03/03/24 0045)  Pulse: 79 (03/03/24 0115)  Resp: (!) 21 (03/03/24 0115)  BP: (!) 150/67 (03/03/24 0100)  SpO2: 98 % (03/03/24 0115)  Weight: 66 kg (145 lb 8.1 oz) (03/03/24 0050)  Height: 5' 4" (162.6 cm) (03/03/24 0050)    PICC  Performed by: Yossi Vargas RN  Consent Done: Yes  Time out: Immediately prior to procedure a time out was called to verify the correct patient, procedure, equipment, support staff and site/side marked as required  Indications: med administration  Anesthesia: local infiltration  Local anesthetic: lidocaine 1% without epinephrine  Anesthetic Total (mL): 3  Preparation: skin prepped with ChloraPrep  Skin prep agent dried: skin prep agent completely dried prior to procedure  Sterile barriers: all five maximum sterile barriers used - cap, mask, sterile gown, sterile gloves, and large sterile sheet  Hand hygiene: hand hygiene performed prior to central venous catheter insertion  Location details: right basilic  Catheter type: double lumen  Catheter size: 4 Fr  Catheter Length: 36cm    Ultrasound guidance: yes  Vessel Caliber: medium and patent, compressibility normal  Vascular Doppler: not done  Needle advanced into vessel with real time Ultrasound guidance.  Guidewire confirmed in vessel.  Sterile sheath used.  no esophageal manometryNumber of attempts: 1  Post-procedure: blood return through all ports, chlorhexidine patch and sterile dressing applied  Estimated blood loss (mL): 0  Specimens: No  Implants: No          Name LOIS Sy  3/3/2024    "

## 2024-03-03 NOTE — PLAN OF CARE
Initial PT eval completed. Patient CGA with bed mobility and CGA with stand pivot transfer with HHA to bedside chair. Recommend low intensity therapy.

## 2024-03-03 NOTE — PT/OT/SLP EVAL
Physical Therapy Evaluation    Patient Name:  Maryjane Mcintyre   MRN:  [id number]    Recommendations:     Discharge Recommendations: Low Intensity Therapy   Discharge Equipment Recommendations: none   Barriers to discharge: None    Assessment:     Maryjane Mcintyre is a 73 y.o. female admitted with a medical diagnosis of Hypokalemia.  She presents with the following impairments/functional limitations: weakness, impaired balance, decreased safety awareness, impaired endurance, impaired cardiopulmonary response to activity, impaired self care skills, decreased coordination, decreased ROM, impaired functional mobility, decreased upper extremity function, gait instability, decreased lower extremity function.    Rehab Prognosis: Good; patient would benefit from acute skilled PT services to address these deficits and reach maximum level of function.    Recent Surgery: * No surgery found *      Plan:     During this hospitalization, patient to be seen 3 x/week to address the identified rehab impairments via therapeutic exercises, therapeutic activities, gait training and progress toward the following goals:    Plan of Care Expires:  03/17/24    Subjective     Chief Complaint: None stated  Patient/Family Comments/goals: Return to prior level of function.  Pain/Comfort:  Pain Rating 1: 0/10    Patients cultural, spiritual, Mu-ism conflicts given the current situation: no    Living Environment:    Patient lives with her daughter in a single level home with no steps to enter.   Prior to admission, patients level of function was Mod I with a RW for ambulation, Mod I with ADLs.  Equipment used at home: walker, rolling, shower chair.  DME owned (not currently used): none.  Upon discharge, patient will have assistance from daughter.    Objective:     Communicated with LOIS Nam prior to session.  Patient found supine with telemetry, oxygen, blood pressure cuff, peripheral IV  upon PT entry to room.    General Precautions: Standard,  fall  Orthopedic Precautions:N/A   Braces: N/A  Respiratory Status: Room air    Exams:  Cognitive Exam:  Patient is oriented to Person, Place, Time, and Situation  RLE ROM: WFL  RLE Strength: WFL  LLE ROM: WFL  LLE Strength: WFL    Functional Mobility:  Bed Mobility:     Supine to Sit: contact guard assistance  Transfers:     Sit to Stand:  contact guard assistance with hand-held assist  Bed to Chair: contact guard assistance with  hand-held assist  using  Stand Pivot      AM-PAC 6 CLICK MOBILITY  Total Score:14       Treatment & Education:    Patient found supine in bed upon PT entrance into room. PT provided education on role of PT and POC.    Patient completed:     Sup>sit: CGA with use of bed rails     Scooting: CGA to place feet on the floor    STS: CGA with HHA    Tx: Pivot to chair with HHA    Patient left up in chair with all lines intact and call button in reach.    GOALS:   Multidisciplinary Problems       Physical Therapy Goals          Problem: Physical Therapy    Goal Priority Disciplines Outcome Goal Variances Interventions   Physical Therapy Goal     PT, PT/OT      Description: Patient will be seen a minimal of 3 out of 7 days a week.    LTG to be met by 03/17/24:    Bed mobility: Mod I  Transfers: Mod I with RW  Gait: Mod I with RW x100 feet                        History:     Past Medical History:   Diagnosis Date    Arthritis     Diabetes mellitus, type 2     GERD (gastroesophageal reflux disease)     Gout     Hyperlipidemia     Hypertension     Onychomycosis     PAD (peripheral artery disease)        Past Surgical History:   Procedure Laterality Date    Cardiac Echo      COLONOSCOPY N/A 10/19/2020    Procedure: COLONOSCOPY;  Surgeon: Yousuf Fuller MD;  Location: [facility];  Service: Endoscopy;  Laterality: N/A;    ESOPHAGOGASTRODUODENOSCOPY N/A 10/19/2020    Procedure: ESOPHAGOGASTRODUODENOSCOPY (EGD);  Surgeon: Yousuf Fuller MD;  Location: [facility];  Service: Endoscopy;  Laterality:  N/A;    GALLBLADDER SURGERY      HYSTERECTOMY         Time Tracking:     PT Received On: 03/03/24  PT Start Time: 1100     PT Stop Time: 1123  PT Total Time (min): 23 min     Billable Minutes: Evaluation 11 and Therapeutic Activity 12      03/03/2024

## 2024-03-03 NOTE — PLAN OF CARE
-Pt admitted from ED about 0100 for hypokalemia  -Currently continuing K replacement with 80meq KCL  -VSS  -12 lead EKG shows NSR with BBB  -No pain  -Purewick in place with 1650ml output  -Pending stool sample. No BM today  -In ICU received 1g Mg, 20 MEQ KCL (and now the 80 MEQ infusing), 25MEW potassium bicarb and started 1.2NS infusion.  -Bed alarm set and call light in reach  -patient 2 daughters and patient updated on POC

## 2024-03-03 NOTE — HPI
Maryjane Mcintyre is a 73 year old female with past medical history significant for hypertension, hyperlipidemia, diabetes, PAD, gout, arthritis and GERD who presented to ED per instructions of her PCP after her annual physical/lab results demonstrated hypokalemia, K 2.2.  She has no complaints, denies chest pain, palpitations, weakness, dizziness, cough, fever, abdominal pain, nausea, vomiting, dysuria. She does tell me that she has been having diarrhea daily for about one week. Repeat K 2.3. ROSA also noted. While in ED, she was given 1 L NS fluid bolus, oral extended release potassium 40 mEq and 20 mEq IV potassium. Magnesium is low as well, 1.4 and was supplemented. Potassium level on recheck was still 2.3.  EKG was done and there are some changes including left BBB, first deg AVB, and prolonged QTC. Patient has been asymptomatic. Vital signs are stable.   none

## 2024-03-03 NOTE — PLAN OF CARE
Goals to be met by: 3/17/24     Patient will increase functional independence with ADLs by performing:    LE Dressing with Stand-by Assistance.  Toileting from bedside commode with Set-up Assistance for hygiene and clothing management.   Toilet transfer to bedside commode with Supervision.    DC with low intensity OT indicated.

## 2024-03-03 NOTE — SUBJECTIVE & OBJECTIVE
Past Medical History:   Diagnosis Date    Arthritis     Diabetes mellitus, type 2     GERD (gastroesophageal reflux disease)     Gout     Hyperlipidemia     Hypertension     Onychomycosis     PAD (peripheral artery disease)        Past Surgical History:   Procedure Laterality Date    Cardiac Echo      COLONOSCOPY N/A 10/19/2020    Procedure: COLONOSCOPY;  Surgeon: Yousuf Fuller MD;  Location: [facility];  Service: Endoscopy;  Laterality: N/A;    ESOPHAGOGASTRODUODENOSCOPY N/A 10/19/2020    Procedure: ESOPHAGOGASTRODUODENOSCOPY (EGD);  Surgeon: Yousuf Fuller MD;  Location: [facility] ENDO;  Service: Endoscopy;  Laterality: N/A;    GALLBLADDER SURGERY      HYSTERECTOMY         Review of patient's allergies indicates:  No Known Allergies    Family History       Problem Relation (Age of Onset)    Colon cancer Sister, Brother    Diabetes Father    Heart attack Sister    Hypertension Father    Stroke Sister          Tobacco Use    Smoking status: Former     Current packs/day: 1.00     Types: Cigarettes    Smokeless tobacco: Never    Tobacco comments:     Quit 2019 mid year   Substance and Sexual Activity    Alcohol use: No    Drug use: Never    Sexual activity: Not Currently         Review of Systems   Constitutional: Negative.  Negative for activity change, appetite change, chills, diaphoresis, fatigue and fever.   HENT: Negative.     Eyes: Negative.    Respiratory: Negative.  Negative for cough, chest tightness, shortness of breath and wheezing.    Cardiovascular: Negative.  Negative for chest pain, palpitations and leg swelling.   Gastrointestinal:  Positive for diarrhea. Negative for abdominal distention, abdominal pain, blood in stool, constipation, nausea and vomiting.        Diarrhea x one week   Endocrine: Negative.    Genitourinary: Negative.  Negative for dysuria and urgency.   Musculoskeletal:  Positive for arthralgias.   Skin: Negative.    Allergic/Immunologic: Negative.    Neurological: Negative.   Negative for dizziness, seizures, syncope, weakness, light-headedness and headaches.   Hematological: Negative.    Psychiatric/Behavioral: Negative.       Objective:     Vital Signs (Most Recent):  Temp: 98.4 °F (36.9 °C) (03/03/24 0045)  Pulse: 77 (03/03/24 0200)  Resp: 19 (03/03/24 0200)  BP: (!) 154/74 (03/03/24 0200)  SpO2: 99 % (03/03/24 0200) Vital Signs (24h Range):  Temp:  [98 °F (36.7 °C)-98.4 °F (36.9 °C)] 98.4 °F (36.9 °C)  Pulse:  [73-81] 77  Resp:  [10-21] 19  SpO2:  [97 %-99 %] 99 %  BP: (111-173)/(55-79) 154/74     Weight: 66 kg (145 lb 8.1 oz)  Body mass index is 24.98 kg/m².      Intake/Output Summary (Last 24 hours) at 3/3/2024 0234  Last data filed at 3/3/2024 0200  Gross per 24 hour   Intake 1330.57 ml   Output 600 ml   Net 730.57 ml        Physical Exam  Vitals reviewed.   Constitutional:       General: She is not in acute distress.     Appearance: Normal appearance. She is not ill-appearing, toxic-appearing or diaphoretic.   HENT:      Head: Normocephalic and atraumatic.      Nose: Nose normal.      Mouth/Throat:      Mouth: Mucous membranes are moist.      Pharynx: Oropharynx is clear.   Eyes:      Extraocular Movements: Extraocular movements intact.      Pupils: Pupils are equal, round, and reactive to light.   Cardiovascular:      Rate and Rhythm: Normal rate and regular rhythm.      Heart sounds: Murmur heard.   Pulmonary:      Effort: Pulmonary effort is normal. No respiratory distress.      Breath sounds: Normal breath sounds. No wheezing or rales.   Abdominal:      General: Bowel sounds are normal. There is no distension.      Palpations: Abdomen is soft.      Tenderness: There is no abdominal tenderness. There is no guarding.   Genitourinary:     Comments: deferred  Musculoskeletal:      Cervical back: Neck supple.      Right lower leg: No edema.      Left lower leg: No edema.   Skin:     General: Skin is warm and dry.      Capillary Refill: Capillary refill takes less than 2 seconds.    Neurological:      General: No focal deficit present.      Mental Status: She is alert and oriented to person, place, and time.   Psychiatric:         Mood and Affect: Mood normal.         Behavior: Behavior normal.          Vents: N/A       Lines/Drains/Airways       Peripherally Inserted Central Catheter Line  Duration             PICC Double Lumen 03/03/24 0120 right basilic <1 day              Drain  Duration             Female External Urinary Catheter w/ Suction 03/02/24 2359 <1 day              Peripheral Intravenous Line  Duration                  Peripheral IV - Single Lumen 03/02/24 2000 Left Antecubital <1 day                    Significant Labs:    CBC/Anemia Profile:  Recent Labs   Lab 03/02/24 1839   WBC 5.00   HGB 9.3*   HCT 28.5*      MCV 81*   RDW 14.9*        Chemistries:  Recent Labs   Lab 03/02/24  0846 03/02/24  1839 03/02/24 1959 03/02/24 2217    141  --  142   K 2.2* 2.3*  --  2.3*    107  --  110   CO2 24 20*  --  22*   BUN 15 17  --  16   CREATININE 1.8* 2.1*  --  1.9*   CALCIUM 9.4 8.9  --  8.7   ALBUMIN 3.6  --   --   --    PROT 6.7  --   --   --    BILITOT 0.5  --   --   --    ALKPHOS 42*  --   --   --    ALT 56*  --   --   --    AST 49*  --   --   --    MG  --   --  1.4*  --        All pertinent labs within the past 24 hours have been reviewed.    Significant Imaging:   I have reviewed all pertinent imaging results/findings within the past 24 hours.  CXR ordered

## 2024-03-03 NOTE — ASSESSMENT & PLAN NOTE
Potassium level checked during routine annual physical with PCP, called to go to ED due to potassium level 2.2, repeated and was 2.3  Likely due to diarrhea for the past week, and on diuretic/ARB --holding home medications for now  Slow to respond to interventions per ED and having some EKG changes, asymptomatic  Cardiac monitoring, repeat EKG in AM   Potassium bicarb ordered (rather than extended release preparation)  PICC placed to facilitate more aggressive repletion  Continue potassium and magnesium replacement  Monitor BMP every 4 hours for now

## 2024-03-03 NOTE — PROGRESS NOTES
Pharmacist Renal Dose Adjustment Note    Maryjane Mcintyre is a 73 y.o. female being treated with the medication famotidine    Patient Data:    Vital Signs (Most Recent):  Temp: 98.4 °F (36.9 °C) (03/03/24 0045)  Pulse: 79 (03/03/24 0115)  Resp: (!) 21 (03/03/24 0115)  BP: (!) 150/67 (03/03/24 0100)  SpO2: 98 % (03/03/24 0115) Vital Signs (72h Range):  Temp:  [98 °F (36.7 °C)-98.4 °F (36.9 °C)]   Pulse:  [73-81]   Resp:  [10-21]   BP: (111-173)/(55-79)   SpO2:  [97 %-99 %]      Recent Labs   Lab 03/02/24  0846 03/02/24  1839 03/02/24  2217   CREATININE 1.8* 2.1* 1.9*     Serum creatinine: 1.9 mg/dL (H) 03/02/24 2217  Estimated creatinine clearance: 25 mL/min (A)    Famotidine 20 mg BID will be changed to famotidine 20 mg QD for CrCl <50 mL/min.     Pharmacist's Name: Barb Reinoso  Pharmacist's Extension: [phone]

## 2024-03-04 ENCOUNTER — TELEPHONE (OUTPATIENT)
Dept: INTERNAL MEDICINE | Facility: CLINIC | Age: 74
End: 2024-03-04
Payer: MEDICARE

## 2024-03-04 VITALS
DIASTOLIC BLOOD PRESSURE: 95 MMHG | RESPIRATION RATE: 13 BRPM | TEMPERATURE: 98 F | SYSTOLIC BLOOD PRESSURE: 126 MMHG | HEART RATE: 75 BPM | WEIGHT: 149.25 LBS | OXYGEN SATURATION: 99 % | BODY MASS INDEX: 25.48 KG/M2 | HEIGHT: 64 IN

## 2024-03-04 DIAGNOSIS — E87.6 HYPOKALEMIA: Primary | ICD-10-CM

## 2024-03-04 LAB
ANION GAP SERPL CALC-SCNC: 6 MMOL/L (ref 8–16)
ANION GAP SERPL CALC-SCNC: 6 MMOL/L (ref 8–16)
ANION GAP SERPL CALC-SCNC: 8 MMOL/L (ref 8–16)
ANION GAP SERPL CALC-SCNC: 8 MMOL/L (ref 8–16)
ANION GAP SERPL CALC-SCNC: 9 MMOL/L (ref 8–16)
BUN SERPL-MCNC: 14 MG/DL (ref 8–23)
BUN SERPL-MCNC: 15 MG/DL (ref 8–23)
BUN SERPL-MCNC: 15 MG/DL (ref 8–23)
CALCIUM SERPL-MCNC: 8.2 MG/DL (ref 8.7–10.5)
CALCIUM SERPL-MCNC: 8.3 MG/DL (ref 8.7–10.5)
CALCIUM SERPL-MCNC: 8.4 MG/DL (ref 8.7–10.5)
CALCIUM SERPL-MCNC: 8.5 MG/DL (ref 8.7–10.5)
CALCIUM SERPL-MCNC: 8.5 MG/DL (ref 8.7–10.5)
CHLORIDE SERPL-SCNC: 109 MMOL/L (ref 95–110)
CHLORIDE SERPL-SCNC: 113 MMOL/L (ref 95–110)
CHLORIDE SERPL-SCNC: 114 MMOL/L (ref 95–110)
CO2 SERPL-SCNC: 18 MMOL/L (ref 23–29)
CO2 SERPL-SCNC: 19 MMOL/L (ref 23–29)
CREAT SERPL-MCNC: 1.7 MG/DL (ref 0.5–1.4)
CREAT SERPL-MCNC: 1.8 MG/DL (ref 0.5–1.4)
CREAT SERPL-MCNC: 1.8 MG/DL (ref 0.5–1.4)
EST. GFR  (NO RACE VARIABLE): 30 ML/MIN/1.73 M^2
EST. GFR  (NO RACE VARIABLE): 30 ML/MIN/1.73 M^2
EST. GFR  (NO RACE VARIABLE): 32 ML/MIN/1.73 M^2
GLUCOSE SERPL-MCNC: 152 MG/DL (ref 70–110)
GLUCOSE SERPL-MCNC: 158 MG/DL (ref 70–110)
GLUCOSE SERPL-MCNC: 235 MG/DL (ref 70–110)
GLUCOSE SERPL-MCNC: 266 MG/DL (ref 70–110)
GLUCOSE SERPL-MCNC: 273 MG/DL (ref 70–110)
POCT GLUCOSE: 239 MG/DL (ref 70–110)
POCT GLUCOSE: 265 MG/DL (ref 70–110)
POTASSIUM SERPL-SCNC: 3 MMOL/L (ref 3.5–5.1)
POTASSIUM SERPL-SCNC: 3.2 MMOL/L (ref 3.5–5.1)
POTASSIUM SERPL-SCNC: 3.9 MMOL/L (ref 3.5–5.1)
POTASSIUM SERPL-SCNC: 4 MMOL/L (ref 3.5–5.1)
POTASSIUM SERPL-SCNC: 6.6 MMOL/L (ref 3.5–5.1)
SODIUM SERPL-SCNC: 134 MMOL/L (ref 136–145)
SODIUM SERPL-SCNC: 138 MMOL/L (ref 136–145)
SODIUM SERPL-SCNC: 140 MMOL/L (ref 136–145)
SODIUM SERPL-SCNC: 140 MMOL/L (ref 136–145)
SODIUM SERPL-SCNC: 141 MMOL/L (ref 136–145)

## 2024-03-04 PROCEDURE — 97530 THERAPEUTIC ACTIVITIES: CPT

## 2024-03-04 PROCEDURE — 80048 BASIC METABOLIC PNL TOTAL CA: CPT | Mod: 91 | Performed by: NURSE PRACTITIONER

## 2024-03-04 PROCEDURE — 97535 SELF CARE MNGMENT TRAINING: CPT

## 2024-03-04 PROCEDURE — 25000003 PHARM REV CODE 250: Performed by: STUDENT IN AN ORGANIZED HEALTH CARE EDUCATION/TRAINING PROGRAM

## 2024-03-04 PROCEDURE — 63600175 PHARM REV CODE 636 W HCPCS: Performed by: HOSPITALIST

## 2024-03-04 PROCEDURE — 25000003 PHARM REV CODE 250: Performed by: INTERNAL MEDICINE

## 2024-03-04 PROCEDURE — 25000003 PHARM REV CODE 250: Performed by: NURSE PRACTITIONER

## 2024-03-04 PROCEDURE — 97116 GAIT TRAINING THERAPY: CPT

## 2024-03-04 PROCEDURE — 80048 BASIC METABOLIC PNL TOTAL CA: CPT | Mod: 91 | Performed by: STUDENT IN AN ORGANIZED HEALTH CARE EDUCATION/TRAINING PROGRAM

## 2024-03-04 RX ORDER — LANOLIN ALCOHOL/MO/W.PET/CERES
400 CREAM (GRAM) TOPICAL DAILY
Qty: 3 TABLET | Refills: 0 | Status: SHIPPED | OUTPATIENT
Start: 2024-03-05 | End: 2024-03-08

## 2024-03-04 RX ORDER — POTASSIUM CHLORIDE 7.45 MG/ML
30 INJECTION INTRAVENOUS ONCE
Status: DISCONTINUED | OUTPATIENT
Start: 2024-03-04 | End: 2024-03-04 | Stop reason: SDUPTHER

## 2024-03-04 RX ORDER — MORPHINE SULFATE 2 MG/ML
2 INJECTION, SOLUTION INTRAMUSCULAR; INTRAVENOUS ONCE
Status: DISCONTINUED | OUTPATIENT
Start: 2024-03-04 | End: 2024-03-04 | Stop reason: HOSPADM

## 2024-03-04 RX ORDER — POTASSIUM CHLORIDE 20 MEQ/1
40 TABLET, EXTENDED RELEASE ORAL
Status: DISPENSED | OUTPATIENT
Start: 2024-03-04 | End: 2024-03-04

## 2024-03-04 RX ORDER — POTASSIUM CHLORIDE 29.8 MG/ML
30 INJECTION INTRAVENOUS ONCE
Status: COMPLETED | OUTPATIENT
Start: 2024-03-04 | End: 2024-03-04

## 2024-03-04 RX ORDER — POTASSIUM CHLORIDE 20 MEQ/1
40 TABLET, EXTENDED RELEASE ORAL ONCE
Status: DISCONTINUED | OUTPATIENT
Start: 2024-03-04 | End: 2024-03-04

## 2024-03-04 RX ORDER — SIMETHICONE 80 MG
1 TABLET,CHEWABLE ORAL 3 TIMES DAILY PRN
Status: DISCONTINUED | OUTPATIENT
Start: 2024-03-04 | End: 2024-03-04 | Stop reason: HOSPADM

## 2024-03-04 RX ORDER — POTASSIUM CHLORIDE 750 MG/1
20 TABLET, EXTENDED RELEASE ORAL DAILY
Qty: 6 TABLET | Refills: 0 | Status: SHIPPED | OUTPATIENT
Start: 2024-03-04 | End: 2024-03-07

## 2024-03-04 RX ADMIN — FAMOTIDINE 20 MG: 20 TABLET ORAL at 08:03

## 2024-03-04 RX ADMIN — POTASSIUM PHOSPHATE, MONOBASIC POTASSIUM PHOSPHATE, DIBASIC 15 MMOL: 224; 236 INJECTION, SOLUTION, CONCENTRATE INTRAVENOUS at 08:03

## 2024-03-04 RX ADMIN — AMLODIPINE BESYLATE 10 MG: 10 TABLET ORAL at 08:03

## 2024-03-04 RX ADMIN — CILOSTAZOL 50 MG: 50 TABLET ORAL at 08:03

## 2024-03-04 RX ADMIN — INSULIN ASPART 6 UNITS: 100 INJECTION, SOLUTION INTRAVENOUS; SUBCUTANEOUS at 08:03

## 2024-03-04 RX ADMIN — LABETALOL HYDROCHLORIDE 300 MG: 100 TABLET, FILM COATED ORAL at 08:03

## 2024-03-04 RX ADMIN — CHOLECALCIFEROL TAB 125 MCG (5000 UNIT) 5000 UNITS: 125 TAB at 08:03

## 2024-03-04 RX ADMIN — Medication 400 MG: at 08:03

## 2024-03-04 RX ADMIN — POTASSIUM CHLORIDE 30 MEQ: 29.8 INJECTION, SOLUTION INTRAVENOUS at 06:03

## 2024-03-04 RX ADMIN — OXYBUTYNIN CHLORIDE 5 MG: 5 TABLET, EXTENDED RELEASE ORAL at 08:03

## 2024-03-04 RX ADMIN — INSULIN ASPART 4 UNITS: 100 INJECTION, SOLUTION INTRAVENOUS; SUBCUTANEOUS at 11:03

## 2024-03-04 RX ADMIN — SIMETHICONE 80 MG: 80 TABLET, CHEWABLE ORAL at 01:03

## 2024-03-04 RX ADMIN — POTASSIUM CHLORIDE 40 MEQ: 1500 TABLET, EXTENDED RELEASE ORAL at 08:03

## 2024-03-04 NOTE — DISCHARGE INSTRUCTIONS
Recommend compliance with medications, follow-up visits  Recommend follow up visits with primary care within 3 days to undergo repeat labs including BMP/potassium   Recommended to hold hydrochlorothiazide/losartan until further follow up with primary care, further resumption per primary care recommendations.

## 2024-03-04 NOTE — TELEPHONE ENCOUNTER
----- Message from Omer Meza sent at 3/4/2024 12:58 PM CST -----  Suzette ( pt daughter ) is requesting a call from nurse to discuss the pt has been admitted in the  hospital and they are questing labs. Please call [phone]

## 2024-03-04 NOTE — PLAN OF CARE
-uneventful shift  -VSS  -Turns self  -Continuing potassium replacement. Last K 3.0, provider notified  -gasX given for gas pain relief- pt stated very effective and was able to rest comfortably after  -CHG bath and linen change x2  -Bed alarm set and call light in reach

## 2024-03-04 NOTE — PT/OT/SLP PROGRESS
"Physical Therapy Treatment    Patient Name:  Maryjane Mcintyre   MRN:  [id number]    Recommendations:     Discharge Recommendations: Low Intensity Therapy  Discharge Equipment Recommendations: none  Barriers to discharge: None    Assessment:     Maryjane Mcintyre is a 73 y.o. female admitted with a medical diagnosis of Hypokalemia.  She presents with the following impairments/functional limitations: weakness, impaired endurance, impaired functional mobility, gait instability, impaired balance, decreased coordination.    Rehab Prognosis: Good; patient would benefit from acute skilled PT services to address these deficits and reach maximum level of function.    Recent Surgery: * No surgery found *     Plan:     During this hospitalization, patient to be seen 3 x/week to address the identified rehab impairments via gait training, therapeutic activities, therapeutic exercises and progress toward the following goals:    Plan of Care Expires:  03/17/24    Subjective     Chief Complaint: NONE, EAGER TO WALK, "I NEED TO USE THE BATHROOM"  Patient/Family Comments/goals:   Pain/Comfort:  Pain Rating 1: 0/10      Objective:     Communicated with NURSE EVELYN prior to session.  Patient found supine with telemetry, peripheral IV, PureWick, blood pressure cuff, pulse ox (continuous) upon PT entry to room.     General Precautions: Standard, fall  Orthopedic Precautions: N/A  Braces: N/A  Respiratory Status: Room air     Functional Mobility:  Bed Mobility:     Rolling Left:  stand by assistance  Scooting: stand by assistance  Supine to Sit: stand by assistance  Transfers:     Sit to Stand:  contact guard assistance with rolling walker  Bed to Chair: contact guard assistance with  rolling walker  using  Step Transfer  Toilet Transfer: contact guard assistance with  rolling walker  using  Step Transfer-EXTENDED TIME FOR TOILETING BUT GOOD EFFORT CLEANING HERSELF  Gait: PT AMB 70' X 2 TRIALS WITH RW AND CGA, CUES FOR UPRIGHT POSTURE AND RW " "SAFETY, PT WITH INCREASED LATERAL SWAY WITH L LATERAL LEAN BUT ABLE TO CORRECT WITH CUES  Balance: FAIR SITTING BALANCE, FAIR- DYNAMIC BALANCE DURING GAIT    AM-PAC 6 CLICK MOBILITY  Turning over in bed (including adjusting bedclothes, sheets and blankets)?: 4  Sitting down on and standing up from a chair with arms (e.g., wheelchair, bedside commode, etc.): 3  Moving from lying on back to sitting on the side of the bed?: 4  Moving to and from a bed to a chair (including a wheelchair)?: 3  Need to walk in hospital room?: 3  Climbing 3-5 steps with a railing?: 1  Basic Mobility Total Score: 18     Treatment & Education:  PT EDUCATION:  - ROLE OF P.T. AND POC IN ACUTE CARE HOSPITAL SETTING  - RW USE AND SAFETY DURING TF'S AND GAIT  - ENCOURAGED TO INCREASE TIME OOB IN CHAIR TO TOLERANCE   - TO CONTINUE THERAPUETIC EXERCISES THROUGHOUT THE DAY TO INCREASE ACTIVITY TOLERANCE AND DECREASE RISK FOR PNEUMONIA AND BLOOD CLOTS: HIP FLEX/EXT, HIP ABD/ADD, QUAD SET, HEEL SLIDE, AP  - RISK FOR FALLS DUE TO GENERALIZED WEAKNESS, EDUCATED ON "CALL DON'T FALL", ENCOURAGED TO CALL FOR ASSISTANCE WITH ALL NEEDS SUCH AS BED<>CHAIR TRANSFERS OR TRIPS TO BATHROOM, PT AGREEABLE TO SAFETY PRECAUTIONS    Patient left up in chair with all lines intact, call button in reach, chair alarm on, and NURSE notified..    GOALS:   Multidisciplinary Problems       Physical Therapy Goals          Problem: Physical Therapy    Goal Priority Disciplines Outcome Goal Variances Interventions   Physical Therapy Goal     PT, PT/OT Ongoing, Progressing     Description: Patient will be seen a minimal of 3 out of 7 days a week.    LTG to be met by 03/17/24:    Bed mobility: Mod I  Transfers: Mod I with RW  Gait: Mod I with RW x100 feet                        Time Tracking:     PT Received On: 03/04/24  PT Start Time: 0910     PT Stop Time: 0935  PT Total Time (min): 25 min     Billable Minutes: Gait Training 10 and Therapeutic Activity 15    Treatment Type: " Treatment  PT/PTA: PT     Number of PTA visits since last PT visit: 0     03/04/2024

## 2024-03-04 NOTE — PLAN OF CARE
Pt AAOx4. GCS 15. VSS. Afebrile. SR on monitor. Room air. Purewick in place; adequate UOP. No BM this shift.  Accuchecks ACHS; coverage needed. Family updated at the bedside.    Pt resting comfortably in bed with side rails up x3; locked and lowered with alarm set. Call light in place. Advised pt to call out if anything is needed. Pt verbalized understanding.

## 2024-03-04 NOTE — DISCHARGE SUMMARY
[facility] ([facility])  [facility] Medicine  Discharge Summary      Patient Name: Maryjane Mcintyre  MRN: [id number]  [facility]: [id number]  Patient Class: IP- Inpatient  Admission Date: 3/2/2024  Hospital Length of Stay: 2 days  Discharge Date and Time: 3/4/24  Attending Physician: Dc Daly,*   Discharging Provider: Dc Daly MD  Primary Care Provider: Angelita Dorado DO    Primary Care Team: [facility] MEDICINE B    HPI:   No notes on file    * No surgery found *      Hospital Course:   Maryjane Mcintyre, a 73-year-old woman with a history of multiple chronic conditions including hypertension, hyperlipidemia, diabetes, peripheral artery disease, gout, arthritis, and gastroesophageal reflux disease, was admitted to the emergency department  following her primary care physician's advice after discovering hypokalemia (potassium level at 2.2) during her annual physical examination. Interventions in the ED only minimally improved K levels, remaining at 2.3. Acute kidney injury was also observed on repeat labs. EKG revealed left bundle branch block, first-degree atrioventricular block, and prolonged QTc interval. Patient was admitted to the ICU for aggressive potassium replacement, intravenous fluids, and supportive care, including magnesium replacement.     By the following day, her condition had stabilized enough for her to be transferred to a telemetry unit, with her symptomatic hypokalemia improving from 2.2 to 3.2 after receiving significant potassium supplementation. Her overnight telemetry monitoring was stable, leading to a switch from potassium chloride to potassium phosphate for additional phosphate replacement while continuing magnesium supplementation. Throughout this period, she remained symptom-free and demonstrated improvement in her condition. Hospital medicine consulted for assumption of care on 3/3/24;    On 03/04/2024, examination done at bedside, appeared alert and oriented x3.   Denied acute issues overnight   Denied chest pain, shortness O breath, nausea, vomiting, palpitations, bowel or bladder issues.    Denied any further episodes of diarrhea.    Appeared hemodynamically stable   Creatinine trended down, potassium trended up to 3.9, with repeat potassium stated at 4;   Stated having diarrhea prior to hospital admission, also has been on hydrochlorothiazide, losartan at home.    During hospital stay, denied further episodes of diarrhea, hydrochlorothiazide, losartan has been held, blood pressure is stable.    Considering clinical and hemodynamic stability, stabilization of potassium levels, plan to discharge patient today, emphasized on compliance with medications, to follow up with primary care within 3 days to undergo repeat labs including BMP/potassium, to hold hydrochlorothiazide, losartan until follow up with primary care,-- patient/daughter expressed understanding, agreed with the plan.    Discharge accordingly today        Goals of Care Treatment Preferences:  Code Status: Full Code      Consults:   Consults (From admission, onward)          Status Ordering Provider     Inpatient consult to Hospitalist  Once        Provider:  (Not yet assigned)    Acknowledged BARBIE SHEIKH     Inpatient consult to PICC team ([facility])  Once        Provider:  (Not yet assigned)    Acknowledged WHIT CAMEJO            No new Assessment & Plan notes have been filed under this hospital service since the last note was generated.  Service: Hospital Medicine    Final Active Diagnoses:    Diagnosis Date Noted POA    PRINCIPAL PROBLEM:  Hypokalemia [E87.6] 03/03/2024 Yes    Hypomagnesemia [E83.42] 03/03/2024 Yes    Diarrhea of presumed infectious origin [R19.7] 03/03/2024 Yes    ROSA (acute kidney injury) [N17.9] 03/03/2024 Yes    Uncontrolled type 2 diabetes mellitus with hyperglycemia, with long-term current use of insulin [E11.65, Z79.4] 01/30/2019 Not Applicable      Problems Resolved During this  Admission:       Discharged Condition: good    Disposition: Home or Self Care    Follow Up:   Follow-up Information       Angelita Dorado DO. Go on 3/11/2024.    Specialty: Internal Medicine  Why: Hospital Follow Up  3/11/24 at 9:40AM  Contact information:  [address] [place] [address]  [phone]               [facility] Follow up.    Specialties: Home Health Services, Home Therapy Services, Home Living Aide Services  Contact information:  [address]  [facility] [address]  [phone]                         Patient Instructions:   No discharge procedures on file.    Significant Diagnostic Studies:     Results for orders placed or performed during the hospital encounter of 03/02/24   CBC auto differential   Result Value Ref Range    WBC 5.00 3.90 - 12.70 K/uL    RBC 3.54 (L) 4.00 - 5.40 M/uL    Hemoglobin 9.3 (L) 12.0 - 16.0 g/dL    Hematocrit 28.5 (L) 37.0 - 48.5 %    MCV 81 (L) 82 - 98 fL    MCH 26.3 (L) 27.0 - 31.0 pg    MCHC 32.6 32.0 - 36.0 g/dL    RDW 14.9 (H) 11.5 - 14.5 %    Platelets 203 150 - 450 K/uL    MPV 10.8 9.2 - 12.9 fL    Immature Granulocytes 0.2 0.0 - 0.5 %    Gran # (ANC) 2.5 1.8 - 7.7 K/uL    Immature Grans (Abs) 0.01 0.00 - 0.04 K/uL    Lymph # 1.6 1.0 - 4.8 K/uL    Mono # 0.5 0.3 - 1.0 K/uL    Eos # 0.3 0.0 - 0.5 K/uL    Baso # 0.04 0.00 - 0.20 K/uL    nRBC 0 0 /100 WBC    Gran % 50.4 38.0 - 73.0 %    Lymph % 32.8 18.0 - 48.0 %    Mono % 10.8 4.0 - 15.0 %    Eosinophil % 5.0 0.0 - 8.0 %    Basophil % 0.8 0.0 - 1.9 %    Differential Method Automated    Basic metabolic panel   Result Value Ref Range    Sodium 141 136 - 145 mmol/L    Potassium 2.3 (LL) 3.5 - 5.1 mmol/L    Chloride 107 95 - 110 mmol/L    CO2 20 (L) 23 - 29 mmol/L    Glucose 225 (H) 70 - 110 mg/dL    BUN 17 8 - 23 mg/dL    Creatinine 2.1 (H) 0.5 - 1.4 mg/dL    Calcium 8.9 8.7 - 10.5 mg/dL    Anion Gap 14 8 - 16 mmol/L    eGFR 25 (A) >60 mL/min/1.73 m^2    Magnesium   Result Value Ref Range    Magnesium 1.4 (L) 1.6 - 2.6 mg/dL   Basic metabolic panel   Result Value Ref Range    Sodium 142 136 - 145 mmol/L    Potassium 2.3 (LL) 3.5 - 5.1 mmol/L    Chloride 110 95 - 110 mmol/L    CO2 22 (L) 23 - 29 mmol/L    Glucose 173 (H) 70 - 110 mg/dL    BUN 16 8 - 23 mg/dL    Creatinine 1.9 (H) 0.5 - 1.4 mg/dL    Calcium 8.7 8.7 - 10.5 mg/dL    Anion Gap 10 8 - 16 mmol/L    eGFR 28 (A) >60 mL/min/1.73 m^2   Basic metabolic panel   Result Value Ref Range    Sodium 143 136 - 145 mmol/L    Potassium 2.8 (L) 3.5 - 5.1 mmol/L    Chloride 111 (H) 95 - 110 mmol/L    CO2 22 (L) 23 - 29 mmol/L    Glucose 171 (H) 70 - 110 mg/dL    BUN 16 8 - 23 mg/dL    Creatinine 1.9 (H) 0.5 - 1.4 mg/dL    Calcium 8.9 8.7 - 10.5 mg/dL    Anion Gap 10 8 - 16 mmol/L    eGFR 28 (A) >60 mL/min/1.73 m^2   Basic metabolic panel   Result Value Ref Range    Sodium 146 (H) 136 - 145 mmol/L    Potassium 2.7 (LL) 3.5 - 5.1 mmol/L    Chloride 113 (H) 95 - 110 mmol/L    CO2 23 23 - 29 mmol/L    Glucose 163 (H) 70 - 110 mg/dL    BUN 14 8 - 23 mg/dL    Creatinine 1.8 (H) 0.5 - 1.4 mg/dL    Calcium 9.0 8.7 - 10.5 mg/dL    Anion Gap 10 8 - 16 mmol/L    eGFR 30 (A) >60 mL/min/1.73 m^2   Magnesium   Result Value Ref Range    Magnesium 2.2 1.6 - 2.6 mg/dL   Brain natriuretic peptide   Result Value Ref Range    BNP 65 0 - 99 pg/mL   CBC Auto Differential   Result Value Ref Range    WBC 4.13 3.90 - 12.70 K/uL    RBC 3.39 (L) 4.00 - 5.40 M/uL    Hemoglobin 8.8 (L) 12.0 - 16.0 g/dL    Hematocrit 27.3 (L) 37.0 - 48.5 %    MCV 81 (L) 82 - 98 fL    MCH 26.0 (L) 27.0 - 31.0 pg    MCHC 32.2 32.0 - 36.0 g/dL    RDW 15.1 (H) 11.5 - 14.5 %    Platelets 175 150 - 450 K/uL    MPV 11.2 9.2 - 12.9 fL    Immature Granulocytes 0.2 0.0 - 0.5 %    Gran # (ANC) 2.4 1.8 - 7.7 K/uL    Immature Grans (Abs) 0.01 0.00 - 0.04 K/uL    Lymph # 1.2 1.0 - 4.8 K/uL    Mono # 0.4 0.3 - 1.0 K/uL    Eos # 0.2 0.0 - 0.5 K/uL    Baso # 0.02 0.00 - 0.20 K/uL     nRBC 0 0 /100 WBC    Gran % 57.9 38.0 - 73.0 %    Lymph % 27.8 18.0 - 48.0 %    Mono % 9.7 4.0 - 15.0 %    Eosinophil % 3.9 0.0 - 8.0 %    Basophil % 0.5 0.0 - 1.9 %    Differential Method Automated    Magnesium   Result Value Ref Range    Magnesium 2.6 1.6 - 2.6 mg/dL   Potassium, urine, random   Result Value Ref Range    Potassium, Urine <10 (L) 15 - 95 mmol/L   Creatinine, urine, random   Result Value Ref Range    Creatinine, Urine 15.9 15.0 - 325.0 mg/dL   Urinalysis, Reflex to Urine Culture Urine, Clean Catch    Specimen: Urine   Result Value Ref Range    Specimen UA Urine, Clean Catch     Color, UA Colorless (A) Yellow, Straw, Duyen    Appearance, UA Clear Clear    pH, UA 8.0 5.0 - 8.0    Specific Gravity, UA 1.005 1.005 - 1.030    Protein, UA 1+ (A) Negative    Glucose, UA 4+ (A) Negative    Ketones, UA Negative Negative    Bilirubin (UA) Negative Negative    Occult Blood UA 2+ (A) Negative    Nitrite, UA Negative Negative    Urobilinogen, UA Negative <2.0 EU/dL    Leukocytes, UA Trace (A) Negative   Urinalysis Microscopic   Result Value Ref Range    RBC, UA 0 0 - 4 /hpf    WBC, UA 0 0 - 5 /hpf    Bacteria None None-Occ /hpf    Yeast, UA None None    Squam Epithel, UA 2 /hpf    Hyaline Casts, UA 0 0-1/lpf /lpf    Microscopic Comment SEE COMMENT    Phosphorus   Result Value Ref Range    Phosphorus 1.7 (L) 2.7 - 4.5 mg/dL   Basic metabolic panel   Result Value Ref Range    Sodium 146 (H) 136 - 145 mmol/L    Potassium 3.2 (L) 3.5 - 5.1 mmol/L    Chloride 113 (H) 95 - 110 mmol/L    CO2 22 (L) 23 - 29 mmol/L    Glucose 188 (H) 70 - 110 mg/dL    BUN 16 8 - 23 mg/dL    Creatinine 1.9 (H) 0.5 - 1.4 mg/dL    Calcium 9.1 8.7 - 10.5 mg/dL    Anion Gap 11 8 - 16 mmol/L    eGFR 28 (A) >60 mL/min/1.73 m^2   Comprehensive metabolic panel   Result Value Ref Range    Sodium 146 (H) 136 - 145 mmol/L    Potassium 3.2 (L) 3.5 - 5.1 mmol/L    Chloride 113 (H) 95 - 110 mmol/L    CO2 22 (L) 23 - 29 mmol/L    Glucose 188 (H) 70 -  110 mg/dL    BUN 16 8 - 23 mg/dL    Creatinine 1.9 (H) 0.5 - 1.4 mg/dL    Calcium 9.1 8.7 - 10.5 mg/dL    Total Protein 6.3 6.0 - 8.4 g/dL    Albumin 3.6 3.5 - 5.2 g/dL    Total Bilirubin 0.5 0.1 - 1.0 mg/dL    Alkaline Phosphatase 39 (L) 55 - 135 U/L    AST 44 (H) 10 - 40 U/L    ALT 56 (H) 10 - 44 U/L    eGFR 28 (A) >60 mL/min/1.73 m^2    Anion Gap 11 8 - 16 mmol/L   Basic metabolic panel   Result Value Ref Range    Sodium 140 136 - 145 mmol/L    Potassium 3.3 (L) 3.5 - 5.1 mmol/L    Chloride 110 95 - 110 mmol/L    CO2 22 (L) 23 - 29 mmol/L    Glucose 228 (H) 70 - 110 mg/dL    BUN 16 8 - 23 mg/dL    Creatinine 1.8 (H) 0.5 - 1.4 mg/dL    Calcium 8.8 8.7 - 10.5 mg/dL    Anion Gap 8 8 - 16 mmol/L    eGFR 30 (A) >60 mL/min/1.73 m^2   Basic metabolic panel   Result Value Ref Range    Sodium 138 136 - 145 mmol/L    Potassium 3.0 (L) 3.5 - 5.1 mmol/L    Chloride 108 95 - 110 mmol/L    CO2 22 (L) 23 - 29 mmol/L    Glucose 174 (H) 70 - 110 mg/dL    BUN 16 8 - 23 mg/dL    Creatinine 1.9 (H) 0.5 - 1.4 mg/dL    Calcium 8.6 (L) 8.7 - 10.5 mg/dL    Anion Gap 8 8 - 16 mmol/L    eGFR 28 (A) >60 mL/min/1.73 m^2   Basic metabolic panel   Result Value Ref Range    Sodium 141 136 - 145 mmol/L    Potassium 2.8 (L) 3.5 - 5.1 mmol/L    Chloride 111 (H) 95 - 110 mmol/L    CO2 23 23 - 29 mmol/L    Glucose 132 (H) 70 - 110 mg/dL    BUN 16 8 - 23 mg/dL    Creatinine 1.9 (H) 0.5 - 1.4 mg/dL    Calcium 8.9 8.7 - 10.5 mg/dL    Anion Gap 7 (L) 8 - 16 mmol/L    eGFR 28 (A) >60 mL/min/1.73 m^2   Magnesium   Result Value Ref Range    Magnesium 2.5 1.6 - 2.6 mg/dL   Basic metabolic panel   Result Value Ref Range    Sodium 134 (L) 136 - 145 mmol/L    Potassium 6.6 (HH) 3.5 - 5.1 mmol/L    Chloride 109 95 - 110 mmol/L    CO2 19 (L) 23 - 29 mmol/L    Glucose 266 (H) 70 - 110 mg/dL    BUN 15 8 - 23 mg/dL    Creatinine 1.8 (H) 0.5 - 1.4 mg/dL    Calcium 8.2 (L) 8.7 - 10.5 mg/dL    Anion Gap 6 (L) 8 - 16 mmol/L    eGFR 30 (A) >60 mL/min/1.73 m^2   Basic  metabolic panel   Result Value Ref Range    Sodium 140 136 - 145 mmol/L    Potassium 3.0 (L) 3.5 - 5.1 mmol/L    Chloride 113 (H) 95 - 110 mmol/L    CO2 18 (L) 23 - 29 mmol/L    Glucose 158 (H) 70 - 110 mg/dL    BUN 14 8 - 23 mg/dL    Creatinine 1.7 (H) 0.5 - 1.4 mg/dL    Calcium 8.3 (L) 8.7 - 10.5 mg/dL    Anion Gap 9 8 - 16 mmol/L    eGFR 32 (A) >60 mL/min/1.73 m^2   Basic metabolic panel   Result Value Ref Range    Sodium 138 136 - 145 mmol/L    Potassium 3.2 (L) 3.5 - 5.1 mmol/L    Chloride 113 (H) 95 - 110 mmol/L    CO2 19 (L) 23 - 29 mmol/L    Glucose 152 (H) 70 - 110 mg/dL    BUN 15 8 - 23 mg/dL    Creatinine 1.7 (H) 0.5 - 1.4 mg/dL    Calcium 8.4 (L) 8.7 - 10.5 mg/dL    Anion Gap 6 (L) 8 - 16 mmol/L    eGFR 32 (A) >60 mL/min/1.73 m^2   Basic metabolic panel   Result Value Ref Range    Sodium 141 136 - 145 mmol/L    Potassium 3.9 3.5 - 5.1 mmol/L    Chloride 114 (H) 95 - 110 mmol/L    CO2 19 (L) 23 - 29 mmol/L    Glucose 273 (H) 70 - 110 mg/dL    BUN 14 8 - 23 mg/dL    Creatinine 1.8 (H) 0.5 - 1.4 mg/dL    Calcium 8.5 (L) 8.7 - 10.5 mg/dL    Anion Gap 8 8 - 16 mmol/L    eGFR 30 (A) >60 mL/min/1.73 m^2   Basic metabolic panel   Result Value Ref Range    Sodium 140 136 - 145 mmol/L    Potassium 4.0 3.5 - 5.1 mmol/L    Chloride 113 (H) 95 - 110 mmol/L    CO2 19 (L) 23 - 29 mmol/L    Glucose 235 (H) 70 - 110 mg/dL    BUN 14 8 - 23 mg/dL    Creatinine 1.7 (H) 0.5 - 1.4 mg/dL    Calcium 8.5 (L) 8.7 - 10.5 mg/dL    Anion Gap 8 8 - 16 mmol/L    eGFR 32 (A) >60 mL/min/1.73 m^2   EKG 12-lead   Result Value Ref Range    QRS Duration 162 ms    [facility] QTC Calculation 545 ms   Repeat EKG 12-lead   Result Value Ref Range    QRS Duration 176 ms    [facility] QTC Calculation 546 ms   EKG 12-lead   Result Value Ref Range    QRS Duration 92 ms    [facility]HS QTC Calculation 483 ms   Echo   Result Value Ref Range    BSA 1.73 m2    LVOT stroke volume 76.42 cm3    LVIDd 4.15 3.5 - 6.0 cm    LV Systolic Volume 28.13 mL    LV Systolic Volume Index  16.5 mL/m2    LVIDs 2.74 2.1 - 4.0 cm    LV Diastolic Volume 76.53 mL    LV Diastolic Volume Index 44.75 mL/m2    IVS 1.02 0.6 - 1.1 cm    LVOT diameter 1.95 cm    LVOT area 3.0 cm2    FS 34 28 - 44 %    Left Ventricle Relative Wall Thickness 0.47 cm    Posterior Wall 0.97 0.6 - 1.1 cm    LV mass 133.72 g    LV Mass Index 78 g/m2    MV Peak E Ranulfo 1.12 m/s    TDI LATERAL 0.09 m/s    TDI SEPTAL 0.10 m/s    E/E' ratio 11.79 m/s    TR Max Ranulfo 2.63 m/s    IVRT 102.76 msec    E wave deceleration time 217.40 msec    LV SEPTAL E/E' RATIO 11.20 m/s    LV LATERAL E/E' RATIO 12.44 m/s    LVOT peak ranulfo 1.03 m/s    Left Ventricular Outflow Tract Mean Velocity 0.74 cm/s    Left Ventricular Outflow Tract Mean Gradient 2.35 mmHg    TAPSE 1.93 cm    LA size 3.71 cm    Left Atrium Minor Axis 5.37 cm    Left Atrium Major Axis 5.31 cm    RA Major Axis 4.63 cm    AV mean gradient 5 mmHg    AV peak gradient 9 mmHg    Ao peak ranulfo 1.50 m/s    Ao VTI 33.10 cm    LVOT peak VTI 25.60 cm    AV valve area 2.31 cm²    AV Velocity Ratio 0.69     AV index (prosthetic) 0.77     ABHINAV by Velocity Ratio 2.05 cm²    MV stenosis pressure 1/2 time 63.05 ms    MV valve area p 1/2 method 3.49 cm2    Triscuspid Valve Regurgitation Peak Gradient 28 mmHg    PV PEAK VELOCITY 1.20 m/s    PV peak gradient 6 mmHg    Pulmonary Valve Mean Velocity 0.83 m/s    STJ 2.29 cm    Ascending aorta 2.04 cm    IVC diameter 1.16 cm    Mean e' 0.10 m/s    ZLVIDS -0.56     ZLVIDD -1.35     TV resting pulmonary artery pressure 31 mmHg    RV TB RVSP 6 mmHg    Est. RA pres 3 mmHg   POCT glucose   Result Value Ref Range    POCT Glucose 172 (H) 70 - 110 mg/dL   POCT glucose   Result Value Ref Range    POCT Glucose 202 (H) 70 - 110 mg/dL   POCT glucose   Result Value Ref Range    POCT Glucose 268 (H) 70 - 110 mg/dL   POCT glucose   Result Value Ref Range    POCT Glucose 154 (H) 70 - 110 mg/dL   POCT glucose   Result Value Ref Range    POCT Glucose 190 (H) 70 - 110 mg/dL   POCT glucose    Result Value Ref Range    POCT Glucose 265 (H) 70 - 110 mg/dL   POCT glucose   Result Value Ref Range    POCT Glucose 239 (H) 70 - 110 mg/dL        Imaging Results              X-Ray Chest 1 View (Final result)  Result time 03/03/24 00:35:03      Final result by Jani Nelson MD (03/03/24 00:35:03)                   Impression:      As above.      Electronically signed by: Jani Nelson  Date:    03/03/2024  Time:    00:35               Narrative:    EXAMINATION:  XR CHEST 1 VIEW    CLINICAL HISTORY:  history of diastolic heart failure;    TECHNIQUE:  Single frontal view of the chest was performed.    COMPARISON:  None    FINDINGS:  Mildly enlarged cardiac silhouette with pulmonary vascular congestion.  Mild diffuse interstitial prominence compatible with edema and/or pneumonitis.                                       Pending Diagnostic Studies:       Procedure Component Value Units Date/Time    Aldosterone [[id number]] Collected: 03/03/24 0816    Order Status: Sent Lab Status: In process Updated: 03/03/24 1420    Specimen: Blood            Medications:       Medication List        START taking these medications      magnesium oxide 400 mg (241.3 mg magnesium) tablet  Commonly known as: MAG-OX  Take 1 tablet (400 mg total) by mouth once daily. for 3 days  Start taking on: March 5, 2024            CHANGE how you take these medications      potassium chloride SA 10 MEQ tablet  Commonly known as: K-DUR,KLOR-CON M  Take 2 tablets (20 mEq total) by mouth once daily. for 3 days  What changed: how much to take            CONTINUE taking these medications      acetaminophen 160 mg/5 mL Liqd  Commonly known as: TYLENOL     amLODIPine 10 MG tablet  Commonly known as: NORVASC  Take 1 tablet by mouth once daily     celecoxib 200 MG capsule  Commonly known as: CeleBREX  Take 1 capsule (200 mg total) by mouth once daily.     cholecalciferol (vitamin D3) 125 mcg (5,000 unit) capsule     cilostazoL 50 MG  "Tab  Commonly known as: PLETAL  Take 1 tablet by mouth twice daily     CombiVENT RESPIMAT  mcg/actuation inhaler  Generic drug: ipratropium-albuteroL  Inhale 1 puff into the lungs every 6 (six) hours as needed for Wheezing. Rescue     insulin glargine 100 units/mL SubQ pen  Commonly known as: LANTUS SOLOSTAR U-100 INSULIN  Inject 8 Units into the skin every evening.     labetaloL 300 MG tablet  Commonly known as: NORMODYNE  Take 1 tablet (300 mg total) by mouth every 12 (twelve) hours.     omeprazole 20 MG capsule  Commonly known as: PRILOSEC  Take 1 capsule (20 mg total) by mouth once daily.     oxybutynin 5 MG Tr24  Commonly known as: DITROPAN-XL  Take 1 tablet (5 mg total) by mouth once daily.     pen needle, diabetic 32 gauge x 5/32" Ndle  Commonly known as: BD MAGDALENA 2ND GEN PEN NEEDLE  1 each by Other route 2 (two) times a day.     rosuvastatin 40 MG Tab  Commonly known as: CRESTOR  Take 1 tablet (40 mg total) by mouth every evening.     TRIJARDY XR 10-5-1,000 mg Tbph  Generic drug: empaglifloz-linaglip-metformin  Take 1 tablet by mouth once daily.            STOP taking these medications      hydroCHLOROthiazide 12.5 MG Tab  Commonly known as: HYDRODIURIL     losartan 100 MG tablet  Commonly known as: COZAAR               Where to Get Your Medications        These medications were sent to [facility] [address] [place], [facility] [address]      Hours: Mon-Fri, 8a-5:30p Phone: [phone]   magnesium oxide 400 mg (241.3 mg magnesium) tablet  potassium chloride SA 10 MEQ tablet          Indwelling Lines/Drains at time of discharge:   Lines/Drains/Airways       None                   Time spent on the discharge of patient: 82 minutes    Critical care time spent on the evaluation and treatment of severe organ dysfunction, review of pertinent labs and imaging studies, discussions with consulting providers and discussions with patient/family: 82 minutes.     Dc Daly, " MD  Department of Hospital Medicine  [facility] ([facility])

## 2024-03-04 NOTE — HOSPITAL COURSE
Maryjane Mcintyre, a 73-year-old woman with a history of multiple chronic conditions including hypertension, hyperlipidemia, diabetes, peripheral artery disease, gout, arthritis, and gastroesophageal reflux disease, was admitted to the emergency department  following her primary care physician's advice after discovering hypokalemia (potassium level at 2.2) during her annual physical examination. Interventions in the ED only minimally improved K levels, remaining at 2.3. Acute kidney injury was also observed on repeat labs. EKG revealed left bundle branch block, first-degree atrioventricular block, and prolonged QTc interval. Patient was admitted to the ICU for aggressive potassium replacement, intravenous fluids, and supportive care, including magnesium replacement.     By the following day, her condition had stabilized enough for her to be transferred to a telemetry unit, with her symptomatic hypokalemia improving from 2.2 to 3.2 after receiving significant potassium supplementation. Her overnight telemetry monitoring was stable, leading to a switch from potassium chloride to potassium phosphate for additional phosphate replacement while continuing magnesium supplementation. Throughout this period, she remained symptom-free and demonstrated improvement in her condition. Hospital medicine consulted for assumption of care on 3/3/24;    On 03/04/2024, examination done at bedside, appeared alert and oriented x3.  Denied acute issues overnight   Denied chest pain, shortness O breath, nausea, vomiting, palpitations, bowel or bladder issues.    Denied any further episodes of diarrhea.    Appeared hemodynamically stable   Creatinine trended down, potassium trended up to 3.9, with repeat potassium stated at 4;   Stated having diarrhea prior to hospital admission, also has been on hydrochlorothiazide, losartan at home.    During hospital stay, denied further episodes of diarrhea, hydrochlorothiazide, losartan has been held, blood  pressure is stable.    Considering clinical and hemodynamic stability, stabilization of potassium levels, plan to discharge patient today, emphasized on compliance with medications, to follow up with primary care within 3 days to undergo repeat labs including BMP/potassium, to hold hydrochlorothiazide, losartan until follow up with primary care,-- patient/daughter expressed understanding, agreed with the plan.    Discharge accordingly today

## 2024-03-04 NOTE — PT/OT/SLP PROGRESS
Occupational Therapy   Treatment    Name: Maryjane Mcintyre  MRN: [id number]  Admitting Diagnosis:  Hypokalemia       Recommendations:     Discharge Recommendations: Low Intensity Therapy  Discharge Equipment Recommendations:  none  Barriers to discharge:       Assessment:     Maryjane Mcintyre is a 73 y.o. female with a medical diagnosis of Hypokalemia.   Performance deficits affecting function are weakness, impaired functional mobility, decreased safety awareness, gait instability, impaired endurance, decreased upper extremity function, impaired balance, impaired self care skills, decreased lower extremity function.     Rehab Prognosis:  Good; patient would benefit from acute skilled OT services to address these deficits and reach maximum level of function.       Plan:     Patient to be seen 2 x/week to address the above listed problems via self-care/home management, therapeutic activities, therapeutic exercises  Plan of Care Expires: 03/17/24  Plan of Care Reviewed with: patient    Subjective     Chief Complaint: Requesting to void  Patient/Family Comments/goals: Return to PLOF  Pain/Comfort:  Pain Rating 1: 0/10  Pain Rating Post-Intervention 1: 0/10    Objective:     Communicated with: Nurse prior to session.  Patient found supine with telemetry, peripheral IV, PureWick, blood pressure cuff upon OT entry to room.    General Precautions: Standard, fall    Orthopedic Precautions:N/A  Braces: N/A  Respiratory Status: Room air     Occupational Performance:     Bed Mobility:    Patient completed Rolling/Turning to Right with stand by assistance  Patient completed Scooting/Bridging with stand by assistance  Patient completed Supine to Sit with stand by assistance     Functional Mobility/Transfers:  Patient completed Sit <> Stand Transfer with contact guard assistance  with  rolling walker   Patient completed Bed <> Chair Transfer using Stand Pivot technique with contact guard assistance with rolling walker  Patient completed  Toilet Transfer Stand Pivot technique with contact guard assistance with  rolling walker  Functional Mobility: Pt ambulated 2x70 feet with RW CGA with verbal cues needed for RW safety and sequencing    Activities of Daily Living:  Lower Body Dressing: stand by assistance donning socks seated in bed  Toileting: contact guard assistance standard commode with use of grab bar for stability      AMPA 6 Click ADL: 20    Treatment & Education:  Pt returned to room following gait training, transferring to bedside chair CGA. Reviewed UE exercises to perform throughout the day to further increase functional strength/endurance needed for daily activities. Pt verbalized understanding. Encouraged to utilize call button for assistance back to bed.    Patient left up in chair with all lines intact, call button in reach, and nurse notified    GOALS:   Multidisciplinary Problems       Occupational Therapy Goals          Problem: Occupational Therapy    Goal Priority Disciplines Outcome Interventions   Occupational Therapy Goal     OT, PT/OT Ongoing, Progressing    Description: Goals to be met by: 3/17/24     Patient will increase functional independence with ADLs by performing:    LE Dressing with Stand-by Assistance.  Toileting from bedside commode with Set-up Assistance for hygiene and clothing management.   Toilet transfer to bedside commode with Supervision.                         Time Tracking:     OT Date of Treatment: 03/04/24  OT Start Time: 0900  OT Stop Time: 0925  OT Total Time (min): 25 min    Billable Minutes:Self Care/Home Management 15  Therapeutic Activity 10    MAI Chamberlain  OT/KIRK: OT     Number of KIRK visits since last OT visit: 0    3/4/2024

## 2024-03-04 NOTE — PLAN OF CARE
[facility] - Intensive Care (Hospital)  Discharge Final Note    Primary Care Provider: Angelita Dorado DO    Expected Discharge Date: 3/4/2024    Final Discharge Note (most recent)       Final Note - 03/04/24 1422          Final Note    Assessment Type Final Discharge Note     Anticipated Discharge Disposition Home-Health Care [facility]     Hospital Resources/Appts/Education Provided Post-Acute resouces added to AVS;Appointments scheduled and added to AVS        Post-Acute Status    Post-Acute Authorization Home Health     Home Health Status Set-up Complete/Auth obtained     Discharge Delays None known at this time                     Important Message from Medicare  Important Message from Medicare regarding Discharge Appeal Rights: Given to patient/caregiver, Signed/date by patient/caregiver     Date IMM was signed: 03/02/24  Time IMM was signed: 1741    Contact Info       Angelita Dorado DO   Specialty: Internal Medicine   Relationship: PCP - [address] [place] [address]   Phone: [phone]       Next Steps: Go on 3/11/2024    Instructions: Hospital Follow Up  3/11/24 at 9:40AM    [facility]   Specialty: Home Health Services, Home Therapy Services, Home Living Aide Services    [address]  [place] [address]   Phone: [phone]       Next Steps: Follow up          PCP follow up scheduled and added to AVS. Discussed home health with patient at bedside. Referral sent to [facility] per patient preference. No other CM needs.

## 2024-03-04 NOTE — NURSING
Pt discharged home. All personal belongings and discharge instructions sent with pt. Medications reviewed with pt and daughter, instructed to call with any questions or concerns.

## 2024-03-04 NOTE — TELEPHONE ENCOUNTER
----- Message from Omer Meza sent at 3/4/2024 12:58 PM CST -----  Suzette ( pt daughter ) is requesting a call from nurse to discuss the pt has been admitted in the  hospital and they are questing labs. Please call [phone]

## 2024-03-05 ENCOUNTER — PATIENT MESSAGE (OUTPATIENT)
Dept: INTERNAL MEDICINE | Facility: CLINIC | Age: 74
End: 2024-03-05
Payer: MEDICARE

## 2024-03-06 ENCOUNTER — PATIENT OUTREACH (OUTPATIENT)
Dept: ADMINISTRATIVE | Facility: HOSPITAL | Age: 74
End: 2024-03-06
Payer: MEDICARE

## 2024-03-06 LAB — ALDOST SERPL-MCNC: 7.9 NG/DL

## 2024-03-06 NOTE — PROGRESS NOTES
Uploaded [place] DM EYE EXAM 2-27-24; faxed to [facility] 1x to request. Reminder set 1 week.

## 2024-03-09 ENCOUNTER — LAB VISIT (OUTPATIENT)
Dept: LAB | Facility: HOSPITAL | Age: 74
End: 2024-03-09
Attending: INTERNAL MEDICINE
Payer: MEDICARE

## 2024-03-09 DIAGNOSIS — E87.6 HYPOKALEMIA: ICD-10-CM

## 2024-03-09 LAB — POTASSIUM SERPL-SCNC: 3.7 MMOL/L (ref 3.5–5.1)

## 2024-03-09 PROCEDURE — 36415 COLL VENOUS BLD VENIPUNCTURE: CPT | Performed by: INTERNAL MEDICINE

## 2024-03-09 PROCEDURE — 84132 ASSAY OF SERUM POTASSIUM: CPT | Performed by: INTERNAL MEDICINE

## 2024-03-11 ENCOUNTER — OFFICE VISIT (OUTPATIENT)
Dept: INTERNAL MEDICINE | Facility: CLINIC | Age: 74
End: 2024-03-11
Payer: MEDICARE

## 2024-03-11 VITALS
BODY MASS INDEX: 25.41 KG/M2 | HEART RATE: 78 BPM | SYSTOLIC BLOOD PRESSURE: 138 MMHG | HEIGHT: 64 IN | WEIGHT: 148.81 LBS | TEMPERATURE: 97 F | OXYGEN SATURATION: 100 % | RESPIRATION RATE: 20 BRPM | DIASTOLIC BLOOD PRESSURE: 76 MMHG

## 2024-03-11 DIAGNOSIS — Z09 HOSPITAL DISCHARGE FOLLOW-UP: Primary | ICD-10-CM

## 2024-03-11 DIAGNOSIS — E78.5 HYPERLIPIDEMIA LDL GOAL <70: ICD-10-CM

## 2024-03-11 DIAGNOSIS — Z79.4 UNCONTROLLED TYPE 2 DIABETES MELLITUS WITH HYPERGLYCEMIA, WITH LONG-TERM CURRENT USE OF INSULIN: ICD-10-CM

## 2024-03-11 DIAGNOSIS — I10 HYPERTENSION GOAL BP (BLOOD PRESSURE) < 130/80: ICD-10-CM

## 2024-03-11 DIAGNOSIS — Z23 NEED FOR VACCINATION: ICD-10-CM

## 2024-03-11 DIAGNOSIS — E11.65 UNCONTROLLED TYPE 2 DIABETES MELLITUS WITH HYPERGLYCEMIA, WITH LONG-TERM CURRENT USE OF INSULIN: ICD-10-CM

## 2024-03-11 PROCEDURE — 90694 VACC AIIV4 NO PRSRV 0.5ML IM: CPT | Mod: S$GLB,,, | Performed by: INTERNAL MEDICINE

## 2024-03-11 PROCEDURE — 99999 PR PBB SHADOW E&M-EST. PATIENT-LVL IV: CPT | Mod: PBBFAC,,, | Performed by: INTERNAL MEDICINE

## 2024-03-11 PROCEDURE — 99214 OFFICE O/P EST MOD 30 MIN: CPT | Mod: 25,S$GLB,, | Performed by: INTERNAL MEDICINE

## 2024-03-11 PROCEDURE — G0008 ADMIN INFLUENZA VIRUS VAC: HCPCS | Mod: S$GLB,,, | Performed by: INTERNAL MEDICINE

## 2024-03-11 RX ORDER — LOSARTAN POTASSIUM 50 MG/1
50 TABLET ORAL DAILY
Qty: 90 TABLET | Refills: 1 | Status: SHIPPED | OUTPATIENT
Start: 2024-03-11

## 2024-03-11 NOTE — PROGRESS NOTES
Maryjane Mcintyre  73 y.o. Black or  female  [id number]    Chief Complaint:  Chief Complaint   Patient presents with    Hospital Follow Up       History of Present Illness:  Recently hospitalized for hypokalemia. Currently off HCTZ but continues to have diarrhea.   Repeat K is within normal range.   HTN--only taking amlodipine and labetalol.   DM--uncontrolled. She is taking Trijardy and Lantus.   HLD--compliant with rosuvastatin.   Laboratory   Lab Results   Component Value Date    WBC 4.13 03/03/2024    HGB 8.8 (L) 03/03/2024    HCT 27.3 (L) 03/03/2024     03/03/2024    CHOL 92 (L) 03/02/2024    TRIG 99 03/02/2024    HDL 31 (L) 03/02/2024    ALT 56 (H) 03/03/2024    AST 44 (H) 03/03/2024     03/04/2024    K 3.7 03/09/2024     (H) 03/04/2024    CREATININE 1.7 (H) 03/04/2024    BUN 14 03/04/2024    CO2 19 (L) 03/04/2024    INR 1.0 02/07/2023    HGBA1C 9.9 (H) 03/02/2024     Lab Results   Component Value Date    LDLCALC 41.2 (L) 03/02/2024     Review of Systems   Respiratory:  Negative for shortness of breath.    Cardiovascular:  Positive for leg swelling. Negative for chest pain.   Gastrointestinal:  Positive for diarrhea.   Musculoskeletal:  Positive for joint pain.   Neurological:  Negative for dizziness.       The following were reviewed: Active problem list, medication list, allergies, family history, social history, and Health Maintenance.     History:  Past Medical History:   Diagnosis Date    Arthritis     Diabetes mellitus, type 2     GERD (gastroesophageal reflux disease)     Gout     Hyperlipidemia     Hypertension     Onychomycosis     PAD (peripheral artery disease)      Past Surgical History:   Procedure Laterality Date    Cardiac Echo      COLONOSCOPY N/A 10/19/2020    Procedure: COLONOSCOPY;  Surgeon: Yousuf Fuller MD;  Location: [facility];  Service: Endoscopy;  Laterality: N/A;    ESOPHAGOGASTRODUODENOSCOPY N/A 10/19/2020    Procedure: ESOPHAGOGASTRODUODENOSCOPY  (EGD);  Surgeon: Yousuf Fuller MD;  Location: [facility];  Service: Endoscopy;  Laterality: N/A;    GALLBLADDER SURGERY      HYSTERECTOMY       Family History   Problem Relation Age of Onset    Diabetes Father     Hypertension Father     Heart attack Sister     Colon cancer Sister     Stroke Sister     Colon cancer Brother      Social History     Socioeconomic History    Marital status:    Tobacco Use    Smoking status: Former     Current packs/day: 1.00     Types: Cigarettes    Smokeless tobacco: Never    Tobacco comments:     Quit 2019 mid year   Substance and Sexual Activity    Alcohol use: No    Drug use: Never    Sexual activity: Not Currently     Social Determinants of Health     Financial Resource Strain: Low Risk  (3/4/2024)    Overall Financial Resource Strain (CARDIA)     Difficulty of Paying Living Expenses: Not very hard   Food Insecurity: No Food Insecurity (3/4/2024)    Hunger Vital Sign     Worried About Running Out of Food in the Last Year: Never true     Ran Out of Food in the Last Year: Never true   Transportation Needs: Unknown (3/4/2024)    PRAPARE - Transportation     Lack of Transportation (Non-Medical): No   Stress: No Stress Concern Present (3/4/2024)    [place] [employer] - Occupational Stress Questionnaire     Feeling of Stress : Not at all   Housing Stability: Low Risk  (3/4/2024)    Housing Stability Vital Sign     Unable to Pay for Housing in the Last Year: No     Number of Places Lived in the Last Year: 1     Unstable Housing in the Last Year: No     Patient Active Problem List   Diagnosis    Uncontrolled type 2 diabetes mellitus with hyperglycemia, with long-term current use of insulin    PAD (peripheral artery disease)    Baker cyst, left    Hypertension goal BP (blood pressure) < 130/80    Smoker    Hyperlipidemia LDL goal <70    Onychomycosis    Gout    Dysphagia    Osteopenia of multiple sites    Asymptomatic bilateral carotid artery stenosis     Cervical myelopathy    Neck pain    Hypokalemia    Hypomagnesemia    Diarrhea of presumed infectious origin    ROSA (acute kidney injury)     Review of patient's allergies indicates:  No Known Allergies    Health Maintenance  Health Maintenance Topics with due status: Not Due       Topic Last Completion Date    Colorectal Cancer Screening 10/19/2020    DEXA Scan 09/16/2021    Diabetes Urine Screening 07/14/2023    Lipid Panel 03/02/2024    Hemoglobin A1c 03/02/2024    High Dose Statin 03/11/2024     Health Maintenance Due   Topic Date Due    TETANUS VACCINE  Never done    Aspirin/Antiplatelet Therapy  Never done    Shingles Vaccine (1 of 2) Never done    RSV Vaccine (Age 60+ and Pregnant patients) (1 - 1-dose 60+ series) Never done    Foot Exam  07/13/2022    COVID-19 Vaccine (5 - 2023-24 season) 09/01/2023    Eye Exam  01/06/2024    Mammogram  05/24/2024       Medications:  Current Outpatient Medications on File Prior to Visit   Medication Sig Dispense Refill    acetaminophen (TYLENOL) 160 mg/5 mL Liqd Take 100 mg by mouth daily as needed. Take one tablet by mouth once daily as needed      amLODIPine (NORVASC) 10 MG tablet Take 1 tablet by mouth once daily 90 tablet 3    celecoxib (CELEBREX) 200 MG capsule Take 1 capsule (200 mg total) by mouth once daily. 90 capsule 3    cholecalciferol, vitamin D3, 5,000 unit capsule Take 5,000 Units by mouth once daily.  1    cilostazoL (PLETAL) 50 MG Tab Take 1 tablet by mouth twice daily 60 tablet 5    insulin (LANTUS SOLOSTAR U-100 INSULIN) glargine 100 units/mL SubQ pen Inject 8 Units into the skin every evening. 9 mL 3    ipratropium-albuteroL (COMBIVENT RESPIMAT)  mcg/actuation inhaler Inhale 1 puff into the lungs every 6 (six) hours as needed for Wheezing. Rescue 12 g 5    labetaloL (NORMODYNE) 300 MG tablet Take 1 tablet (300 mg total) by mouth every 12 (twelve) hours. 180 tablet 3    omeprazole (PRILOSEC) 20 MG capsule Take 1 capsule (20 mg total) by mouth once  "daily. 90 capsule 3    oxybutynin (DITROPAN-XL) 5 MG TR24 Take 1 tablet (5 mg total) by mouth once daily. 90 tablet 3    pen needle, diabetic (BD MAGDALENA 2ND GEN PEN NEEDLE) 32 gauge x 5/32" Ndle 1 each by Other route 2 (two) times a day. 200 each 1    rosuvastatin (CRESTOR) 40 MG Tab Take 1 tablet (40 mg total) by mouth every evening. 90 tablet 1    empaglifloz-linaglip-metformin (TRIJARDY XR) 10-5-1,000 mg TBph Take 1 tablet by mouth once daily. 90 tablet 1     No current facility-administered medications on file prior to visit.       Medications have been reviewed and reconciled with patient at visit today.    Exam:  Vitals:    03/11/24 1002   BP: 138/76   Pulse:    Resp:    Temp:      Weight: 67.5 kg (148 lb 13 oz)   Body mass index is 25.54 kg/m².      Physical Exam  Vitals reviewed.   Constitutional:       General: She is not in acute distress.     Appearance: She is well-developed. She is not ill-appearing.   Eyes:      General: No scleral icterus.  Cardiovascular:      Rate and Rhythm: Normal rate and regular rhythm.      Heart sounds: Normal heart sounds.   Pulmonary:      Effort: Pulmonary effort is normal. No respiratory distress.      Breath sounds: Normal breath sounds.   Abdominal:      General: Bowel sounds are normal.      Palpations: Abdomen is soft.   Musculoskeletal:      Right lower leg: Edema present.      Left lower leg: Edema present.   Skin:     General: Skin is warm and dry.   Neurological:      Mental Status: She is alert and oriented to person, place, and time.   Psychiatric:         Behavior: Behavior normal.       Assessment:  The primary encounter diagnosis was Hospital discharge follow-up. Diagnoses of Hypertension goal BP (blood pressure) < 130/80, Uncontrolled type 2 diabetes mellitus with hyperglycemia, with long-term current use of insulin, Hyperlipidemia LDL goal <70, and Need for vaccination were also pertinent to this visit.    Plan:  Hospital discharge follow-up    Hypertension " goal BP (blood pressure) < 130/80  -     restart losartan (at a lower dose) 50 MG tablet; Take 1 tablet (50 mg total) by mouth once daily.  Dispense: 90 tablet; Refill: 1  -     continue amlodipine and labetalol     Uncontrolled type 2 diabetes mellitus with hyperglycemia, with long-term current use of insulin  -     d/c Trijardy due to diarrhea and metformin  -     empagliflozin (JARDIANCE) 25 mg tablet; Take 1 tablet (25 mg total) by mouth once daily.  Dispense: 90 tablet; Refill: 1  -     linaGLIPtin (TRADJENTA) 5 mg Tab tablet; Take 1 tablet (5 mg total) by mouth once daily.  Dispense: 90 tablet; Refill: 1    Hyperlipidemia LDL goal <70  -     continue rosuvastatin    Need for vaccination  -     Influenza - Quadrivalent (Adjuvanted)    Follow up in about 3 months (around 6/11/2024).

## 2024-03-12 DIAGNOSIS — N32.81 OAB (OVERACTIVE BLADDER): ICD-10-CM

## 2024-03-13 NOTE — PROGRESS NOTES
Population Health Chart Review & Patient Outreach Details      Additional [facility] Health Notes:    2nd request    [place] EYE EXAM 2-27-24; faxed to [facility] 1x to request. Reminder set 1 week.          Updates Requested / Reviewed:      Updated Care Coordination Note, Care Everywhere, and Immunizations Reconciliation Completed or Queried: [place]         Health Maintenance Topics Overdue:      [facility] Score: 2     Eye Exam  Foot Exam    Tetanus Vaccine  Shingles/Zoster Vaccine  RSV Vaccine                  Health Maintenance Topic(s) Outreach Outcomes & Actions Taken:    Eye Exam - Outreach Outcomes & Actions Taken  : External Records Requested & Care Team Updated if Applicable

## 2024-03-13 NOTE — TELEPHONE ENCOUNTER
No care due was identified.  [facility] Embedded Care Due Messages. Reference number: [id number].   3/13/2024 10:46:38 AM CDT

## 2024-03-13 NOTE — TELEPHONE ENCOUNTER
Refill Routing Note   Medication(s) are not appropriate for processing by [facility] for the following reason(s):        No active prescription written by provider    [facility] action(s):  Defer    Medication Therapy Plan: Oxybutynin not previously prescribed by you    Appointments  past 12m or future 3m with PCP    Date Provider   Last Visit   3/11/2024 Angelita Dorado DO   Next Visit   Visit date not found Angelita Dorado DO   ED visits in past 90 days: 0        Note composed:10:50 AM 03/13/2024

## 2024-03-14 RX ORDER — OXYBUTYNIN CHLORIDE 5 MG/1
5 TABLET, EXTENDED RELEASE ORAL
Qty: 90 TABLET | Refills: 3 | Status: SHIPPED | OUTPATIENT
Start: 2024-03-14

## 2024-03-15 ENCOUNTER — PATIENT OUTREACH (OUTPATIENT)
Dept: ADMINISTRATIVE | Facility: HOSPITAL | Age: 74
End: 2024-03-15
Payer: MEDICARE

## 2024-03-15 NOTE — PROGRESS NOTES
Care Coordination Encounter Details:       MyChart Portal Status:         []  Reviewed MyChart Portal Status offered / enrolled if applicable        Additional Notes:     MyChart Outcomes: Pt is enrolled & active          Updates Requested / Reviewed:        Care Everywhere, , Care Team Updated, and Immunizations Reconciliation Completed or Queried: [place]         Health Maintenance Screening(s) Due:      Health Maintenance Topics Overdue:      VBHM Score: 2     Eye Exam  Foot Exam    Tetanus Vaccine  Shingles/Zoster Vaccine  RSV Vaccine                  Health Maintenance Topic(s) Outreach Outcomes & Actions Taken:    Eye Exam - Outreach Outcomes & Actions Taken  : records requested on 3/6/24 from [facility]    Diabetic Foot Exam - Outreach Outcomes & Actions Taken  : diabetic foot exam completed by podiatry on 9/15/23,  updated      Chronic Disease Management:     Diabetes Measure     Lab Results   Component Value Date    HGBA1C 9.9 (H) 03/02/2024           [x]  Reviewed chart for active Diabetes diagnosis     []  Scheduled necessary follow up appointments if needed         Additional Notes:  Next lab appt is scheduled on 6/15/24           Hypertension Measures        BP Readings from Last 1 Encounters:   03/11/24 138/76           [x]  Reviewed chart for active Hypertension diagnosis     []  Reviewed & documented Home BP Cuff     []  Documented a Remote BP if needed & applicable     []  Scheduled necessary follow up appointments with Primary Care if needed               Provider Team Continuity:     Last PCP Visit Date: 3/11/2024          [x]  Reviewed Primary Care Provider Visits, Annual Wellness Visit, and Future          Appointments to ensure appointments have been scheduled and/or           completed        Additional Notes:  Primary care appt is scheduled 6/20/24, attempted to contact the patient to schedule AWV, LVM           Social Determinants of Health          [x]   Reviewed, completed, and/or updated the following sections:                  Food Insecurity, Transportation Needs, Financial Resource Strain,                 Tobacco Use        Additional Notes:  SDOH reviewed 3/4/2024           Care Management, Digital Medicine, and/or Education Referrals    OPCM Risk Score: 48.8         Next Steps - Referral Actions: no referrals entered        Additional Notes:  Attempted to contact the patient to offer to schedule with diabetes ed dept, LVM. Patient is not eligible for [facility] med program

## 2024-03-26 ENCOUNTER — PATIENT MESSAGE (OUTPATIENT)
Dept: INTERNAL MEDICINE | Facility: CLINIC | Age: 74
End: 2024-03-26
Payer: MEDICARE

## 2024-04-01 ENCOUNTER — TELEPHONE (OUTPATIENT)
Dept: INTERNAL MEDICINE | Facility: CLINIC | Age: 74
End: 2024-04-01
Payer: MEDICARE

## 2024-04-01 DIAGNOSIS — Z12.31 ENCOUNTER FOR SCREENING MAMMOGRAM FOR MALIGNANT NEOPLASM OF BREAST: Primary | ICD-10-CM

## 2024-04-01 NOTE — TELEPHONE ENCOUNTER
----- Message from Hannah Gomez sent at 4/1/2024  3:09 PM CDT -----  Type:  Mammogram    Caller is requesting to schedule their annual mammogram appointment.  Order is not listed in EPIC.  Please enter order and contact patient to schedule.  Name of Caller:Maryjane Mcnityre, ( pt's dtr, Coco)   Where would they like the mammogram performed? [facility]   Would the patient rather a call back or a response via [facility]?    Best Call Back Number: [phone]  Additional Information:  Dr Dorado would like her to have a mammo, needs orders sent so that pt can schedule

## 2024-04-04 DIAGNOSIS — M25.561 CHRONIC PAIN OF BOTH KNEES: ICD-10-CM

## 2024-04-04 DIAGNOSIS — G89.29 CHRONIC PAIN OF BOTH KNEES: ICD-10-CM

## 2024-04-04 DIAGNOSIS — K21.9 CHRONIC GERD: ICD-10-CM

## 2024-04-04 DIAGNOSIS — M25.562 CHRONIC PAIN OF BOTH KNEES: ICD-10-CM

## 2024-04-04 NOTE — TELEPHONE ENCOUNTER
----- Message from Silvia Berumen sent at 4/4/2024 10:25 AM CDT -----  Contact: Mare/Daughter  Type:  RX Refill Request    Who Called: Mare  Refill or New Rx:refill  RX Name and Strength:celecoxib (CELEBREX) 200 MG capsule   How is the patient currently taking it? (ex. 1XDay):as prescribed  Is this a 30 day or 90 day RX:  Preferred Pharmacy with phone number:  [facility] - [place], [place] - [address]  [address]  [facility] [address]  Phone: [phone] Fax: [phone]  Local or Mail Order:local  Ordering Provider:Dr. Dorado  Would the patient rather a call back or a response via [facility]? call  Best Call Back Number: [phone]   Additional Information: Patient's daughter reports attempting twice to receive refill; however, have not been able to receive refill and patient has been without prescription for almost one week. Please give Ms. Garvey a call back to notify when sent.     Type:  RX Refill Request    Who Called: Mare  Refill or New Rx:refill  RX Name and Strength:omeprazole (PRILOSEC) 20 MG capsule   How is the patient currently taking it? (ex. 1XDay):as prescribed  Is this a 30 day or 90 day RX:90  Preferred Pharmacy with phone number:  [facility] - [place], [place] - [address]  [address]  formerly [facility] [address]  Phone: [phone] Fax: [phone]  Local or Mail Order:local  Ordering Provider:Dr. Dorado  Would the patient rather a call back or a response via [facility]? call  Best Call Back Number:[phone]   Additional Information: Patient's daughter reports attempting twice to receive refill; however, have not been able to receive refill and patient has been without prescription for almost one week. Please give Ms. Garvey a call back to notify when sent.   Thank you,  [facility]

## 2024-04-04 NOTE — TELEPHONE ENCOUNTER
No care due was identified.  [facility] Embedded Care Due Messages. Reference number: [id number].   4/04/2024 1:58:14 PM CDT

## 2024-04-07 DIAGNOSIS — K21.9 CHRONIC GERD: ICD-10-CM

## 2024-04-07 DIAGNOSIS — M25.561 CHRONIC PAIN OF BOTH KNEES: ICD-10-CM

## 2024-04-07 DIAGNOSIS — M25.562 CHRONIC PAIN OF BOTH KNEES: ICD-10-CM

## 2024-04-07 DIAGNOSIS — G89.29 CHRONIC PAIN OF BOTH KNEES: ICD-10-CM

## 2024-04-07 DIAGNOSIS — I73.9 PAD (PERIPHERAL ARTERY DISEASE): ICD-10-CM

## 2024-04-08 RX ORDER — CELECOXIB 200 MG/1
200 CAPSULE ORAL DAILY
Qty: 90 CAPSULE | Refills: 3 | OUTPATIENT
Start: 2024-04-08

## 2024-04-08 RX ORDER — OMEPRAZOLE 20 MG/1
20 CAPSULE, DELAYED RELEASE ORAL DAILY
Qty: 90 CAPSULE | Refills: 3 | OUTPATIENT
Start: 2024-04-08 | End: 2025-04-08

## 2024-04-08 RX ORDER — CELECOXIB 200 MG/1
200 CAPSULE ORAL
Qty: 90 CAPSULE | Refills: 0 | OUTPATIENT
Start: 2024-04-08

## 2024-04-08 RX ORDER — OMEPRAZOLE 20 MG/1
20 CAPSULE, DELAYED RELEASE ORAL DAILY
Qty: 90 CAPSULE | Refills: 3 | Status: SHIPPED | OUTPATIENT
Start: 2024-04-08 | End: 2025-04-08

## 2024-04-08 RX ORDER — OMEPRAZOLE 20 MG/1
20 CAPSULE, DELAYED RELEASE ORAL DAILY
Qty: 90 CAPSULE | Refills: 3 | Status: SHIPPED | OUTPATIENT
Start: 2024-04-08 | End: 2024-04-08

## 2024-04-08 NOTE — TELEPHONE ENCOUNTER
Refill Decision Note   Maryjane Mcintyre  is requesting a refill authorization.  Brief Assessment and Rationale for Refill:  Quick Discontinue     Medication Therapy Plan:  med pended in previous encounter, awaiting assessment.      Comments:     Note composed:12:04 PM 04/08/2024

## 2024-04-08 NOTE — TELEPHONE ENCOUNTER
JAYM for patient to call us back. She will need an appointment with Dr. Dorado or Oly Fraire, to see about getting a prescription for Celebrex.

## 2024-04-08 NOTE — TELEPHONE ENCOUNTER
Refill Routing Note   Medication(s) are not appropriate for processing by [facility] for the following reason(s):        Outside of protocol: Celecoxib, Cilostazol    [facility] action(s):  Route               Appointments  past 12m or future 3m with PCP    Date Provider   Last Visit   3/11/2024 Angelita Dorado DO   Next Visit   Visit date not found Angelita Dorado DO   ED visits in past 90 days: 0        Note composed:12:05 PM 04/08/2024

## 2024-04-08 NOTE — TELEPHONE ENCOUNTER
----- Message from Milly Alonso sent at 4/8/2024  9:43 AM CDT -----  Contact: Daughter  Type:  RX Refill Request    Who Called: pt daughter   Refill or New Rx:refill  RX Name and Strength:omeprazole (PRILOSEC) 20 MG capsule  How is the patient currently taking it? (ex. 1XDay):1  Is this a 30 day or 90 day RX:90  Preferred Pharmacy with phone number:[facility] - [facility] [address]   Phone: [phone]  Fax: [phone]  Local or Mail Order:Local   Ordering Provider:  Would the patient rather a call back or a response via [facility]? Call   Best Call Back Number:[phone]  Additional Information:      Type:  RX Refill Request      RX Name and Strength:celecoxib (CELEBREX) 200 MG capsule  How is the patient currently taking it? (ex. 1XDay):1  Is this a 30 day or 90 day RX:90    Type:  RX Refill Request      RX Name and Strength:cilostazoL (PLETAL) 50 MG Tab  How is the patient currently taking it? (ex. 1XDay):2  Is this a 30 day or 90 day RX:60       Pt daughter stated pt is out of these prescriptions

## 2024-04-08 NOTE — TELEPHONE ENCOUNTER
No care due was identified.  [facility] Embedded Care Due Messages. Reference number: [id number].   4/08/2024 12:03:53 PM CDT

## 2024-04-09 RX ORDER — CILOSTAZOL 50 MG/1
50 TABLET ORAL 2 TIMES DAILY
Qty: 180 TABLET | Refills: 3 | Status: SHIPPED | OUTPATIENT
Start: 2024-04-09

## 2024-04-09 RX ORDER — CELECOXIB 200 MG/1
200 CAPSULE ORAL DAILY
Qty: 90 CAPSULE | Refills: 3 | OUTPATIENT
Start: 2024-04-09

## 2024-04-12 NOTE — TELEPHONE ENCOUNTER
Provider Staff:  Please note Refusal of medication.     Action required for this patient.      Requested Prescriptions     Signed Prescriptions Disp Refills    cilostazoL (PLETAL) 50 MG Tab 180 tablet 3     Sig: Take 1 tablet (50 mg total) by mouth 2 (two) times daily.     Authorizing Provider: SOPHIA HESS    omeprazole (PRILOSEC) 20 MG capsule 90 capsule 3     Sig: Take 1 capsule (20 mg total) by mouth once daily.     Authorizing Provider: SOPHIA HESS     Ordering User: SISSY SEGOVIA     Refused Prescriptions Disp Refills    celecoxib (CELEBREX) 200 MG capsule [Pharmacy Med Name: Celecoxib 200 MG Oral Capsule] 90 capsule 0     Sig: Take 1 capsule by mouth once daily     Refused By: JOAN RODRIGUEZ     Reason for Refusal: Patient needs an appointment    celecoxib (CELEBREX) 200 MG capsule 90 capsule 3     Sig: Take 1 capsule (200 mg total) by mouth once daily.     Refused By: SOPHIA HESS     Reason for Refusal: Refill not appropriate      Thanks!  [facility]   Note composed: 04/11/2024 10:30 PM

## 2024-04-15 NOTE — PROGRESS NOTES
VBHM Score: 2     Eye Exam  Foot Exam    Tetanus Vaccine  Shingles/Zoster Vaccine  RSV Vaccine                  Health Maintenance Topic(s) Outreach Outcomes & Actions Taken:    Diabetic Foot Exam - Outreach Outcomes & Actions Taken  : foot exam done with Podiatry on 9/15/23,  updated    Eye Exam - Outreach Outcomes & Actions Taken  : pts daughter states the eye exam will be scheduled with an outside provider within the next few weeks, did have a recent appt but it had to be canceled due to pt being in the hospital      Care Management, Digital Medicine, and/or Education Referrals    OPCM Risk Score: 48.4         Next Steps - Referral Actions: no referrals entered        Additional Notes:  SDOH reviewed 3/4/2024, daughter states no needs at this time.

## 2024-05-05 PROCEDURE — G0179 MD RECERTIFICATION HHA PT: HCPCS | Mod: ,,, | Performed by: INTERNAL MEDICINE

## 2024-05-23 DIAGNOSIS — E11.59 HYPERTENSION ASSOCIATED WITH DIABETES: ICD-10-CM

## 2024-05-23 DIAGNOSIS — I15.2 HYPERTENSION ASSOCIATED WITH DIABETES: ICD-10-CM

## 2024-05-23 RX ORDER — LABETALOL 300 MG/1
300 TABLET, FILM COATED ORAL EVERY 12 HOURS
Qty: 60 TABLET | Refills: 5 | Status: SHIPPED | OUTPATIENT
Start: 2024-05-23

## 2024-06-02 NOTE — TELEPHONE ENCOUNTER
Care Due:                  Date            Visit Type   Department     Provider  --------------------------------------------------------------------------------                                [facility]     ON INTERNAL  Last Visit: 03-      FOLLOW UP    MEDICINE       Angelita Dorado  Next Visit: None Scheduled  None         None Found                                                            Last  Test          Frequency    Reason                     Performed    Due Date  --------------------------------------------------------------------------------    HBA1C.......  6 months...  empagliflozin,             03- 08-                             linaGLIPtin..............    [employer] Embedded Care Due Messages. Reference number: [id number].   6/02/2024 11:17:45 AM CDT

## 2024-06-03 PROBLEM — N17.9 AKI (ACUTE KIDNEY INJURY): Status: RESOLVED | Noted: 2024-03-03 | Resolved: 2024-06-03

## 2024-06-03 NOTE — TELEPHONE ENCOUNTER
Refill Routing Note   Medication(s) are not appropriate for processing by [facility] for the following reason(s):        No active prescription written by provider    [facility] action(s):  Defer        Medication Therapy Plan: FLOS      Appointments  past 12m or future 3m with PCP    Date Provider   Last Visit   3/11/2024 Angelita Dorado DO   Next Visit   Visit date not found Angelita Dorado DO   ED visits in past 90 days: 0        Note composed:2:13 AM 06/03/2024

## 2024-06-04 RX ORDER — PEN NEEDLE, DIABETIC 32GX 5/32"
NEEDLE, DISPOSABLE MISCELLANEOUS 2 TIMES DAILY
Qty: 200 EACH | Refills: 2 | Status: SHIPPED | OUTPATIENT
Start: 2024-06-04

## 2024-06-10 ENCOUNTER — PATIENT OUTREACH (OUTPATIENT)
Dept: ADMINISTRATIVE | Facility: HOSPITAL | Age: 74
End: 2024-06-10
Payer: MEDICARE

## 2024-06-10 DIAGNOSIS — Z79.4 UNCONTROLLED TYPE 2 DIABETES MELLITUS WITH HYPERGLYCEMIA, WITH LONG-TERM CURRENT USE OF INSULIN: Primary | ICD-10-CM

## 2024-06-10 DIAGNOSIS — E11.65 UNCONTROLLED TYPE 2 DIABETES MELLITUS WITH HYPERGLYCEMIA, WITH LONG-TERM CURRENT USE OF INSULIN: Primary | ICD-10-CM

## 2024-06-10 NOTE — PROGRESS NOTES
Working Diabetes Lab.    Pt has lab appt scheduled 6/15/24.  Micro albumin urine ordered and linked to appt.

## 2024-06-13 ENCOUNTER — PATIENT MESSAGE (OUTPATIENT)
Dept: CARDIOLOGY | Facility: CLINIC | Age: 74
End: 2024-06-13

## 2024-06-13 ENCOUNTER — OFFICE VISIT (OUTPATIENT)
Dept: PODIATRY | Facility: CLINIC | Age: 74
End: 2024-06-13
Payer: MEDICARE

## 2024-06-13 ENCOUNTER — OFFICE VISIT (OUTPATIENT)
Dept: CARDIOLOGY | Facility: CLINIC | Age: 74
End: 2024-06-13
Payer: MEDICARE

## 2024-06-13 ENCOUNTER — HOSPITAL ENCOUNTER (OUTPATIENT)
Dept: CARDIOLOGY | Facility: HOSPITAL | Age: 74
Discharge: HOME | End: 2024-06-13
Attending: INTERNAL MEDICINE
Payer: MEDICARE

## 2024-06-13 VITALS
HEART RATE: 73 BPM | BODY MASS INDEX: 25.33 KG/M2 | OXYGEN SATURATION: 99 % | DIASTOLIC BLOOD PRESSURE: 70 MMHG | HEIGHT: 64 IN | SYSTOLIC BLOOD PRESSURE: 139 MMHG | WEIGHT: 148.38 LBS

## 2024-06-13 VITALS — WEIGHT: 148.81 LBS | BODY MASS INDEX: 25.41 KG/M2 | HEIGHT: 64 IN

## 2024-06-13 DIAGNOSIS — I63.89 CEREBROVASCULAR ACCIDENT (CVA) DUE TO OTHER MECHANISM: ICD-10-CM

## 2024-06-13 DIAGNOSIS — I73.9 PAD (PERIPHERAL ARTERY DISEASE): ICD-10-CM

## 2024-06-13 DIAGNOSIS — I10 HYPERTENSION GOAL BP (BLOOD PRESSURE) < 130/80: ICD-10-CM

## 2024-06-13 DIAGNOSIS — E78.5 HYPERLIPIDEMIA LDL GOAL <70: Primary | ICD-10-CM

## 2024-06-13 DIAGNOSIS — E11.51 CONTROLLED TYPE 2 DM WITH PERIPHERAL CIRCULATORY DISORDER: Primary | ICD-10-CM

## 2024-06-13 DIAGNOSIS — B35.1 DERMATOPHYTOSIS OF NAIL: ICD-10-CM

## 2024-06-13 PROBLEM — I63.9 CVA (CEREBRAL VASCULAR ACCIDENT): Status: ACTIVE | Noted: 2024-06-13

## 2024-06-13 PROCEDURE — 99999 PR PBB SHADOW E&M-EST. PATIENT-LVL III: CPT | Mod: PBBFAC,,, | Performed by: PODIATRIST

## 2024-06-13 PROCEDURE — 3046F HEMOGLOBIN A1C LEVEL >9.0%: CPT | Mod: CPTII,S$GLB,, | Performed by: INTERNAL MEDICINE

## 2024-06-13 PROCEDURE — 99214 OFFICE O/P EST MOD 30 MIN: CPT | Mod: S$GLB,,, | Performed by: INTERNAL MEDICINE

## 2024-06-13 PROCEDURE — 3288F FALL RISK ASSESSMENT DOCD: CPT | Mod: CPTII,S$GLB,, | Performed by: INTERNAL MEDICINE

## 2024-06-13 PROCEDURE — 1159F MED LIST DOCD IN RCRD: CPT | Mod: CPTII,S$GLB,, | Performed by: INTERNAL MEDICINE

## 2024-06-13 PROCEDURE — 11721 DEBRIDE NAIL 6 OR MORE: CPT | Mod: Q8,S$GLB,, | Performed by: PODIATRIST

## 2024-06-13 PROCEDURE — 1160F RVW MEDS BY RX/DR IN RCRD: CPT | Mod: CPTII,S$GLB,, | Performed by: INTERNAL MEDICINE

## 2024-06-13 PROCEDURE — 3078F DIAST BP <80 MM HG: CPT | Mod: CPTII,S$GLB,, | Performed by: INTERNAL MEDICINE

## 2024-06-13 PROCEDURE — 4010F ACE/ARB THERAPY RXD/TAKEN: CPT | Mod: CPTII,S$GLB,, | Performed by: INTERNAL MEDICINE

## 2024-06-13 PROCEDURE — 1126F AMNT PAIN NOTED NONE PRSNT: CPT | Mod: CPTII,S$GLB,, | Performed by: INTERNAL MEDICINE

## 2024-06-13 PROCEDURE — 99499 UNLISTED E&M SERVICE: CPT | Mod: S$GLB,,, | Performed by: PODIATRIST

## 2024-06-13 PROCEDURE — 3075F SYST BP GE 130 - 139MM HG: CPT | Mod: CPTII,S$GLB,, | Performed by: INTERNAL MEDICINE

## 2024-06-13 PROCEDURE — 1101F PT FALLS ASSESS-DOCD LE1/YR: CPT | Mod: CPTII,S$GLB,, | Performed by: INTERNAL MEDICINE

## 2024-06-13 PROCEDURE — 99999 PR PBB SHADOW E&M-EST. PATIENT-LVL III: CPT | Mod: PBBFAC,,, | Performed by: INTERNAL MEDICINE

## 2024-06-13 PROCEDURE — 93005 ELECTROCARDIOGRAM TRACING: CPT

## 2024-06-13 PROCEDURE — 3008F BODY MASS INDEX DOCD: CPT | Mod: CPTII,S$GLB,, | Performed by: INTERNAL MEDICINE

## 2024-06-13 NOTE — PROGRESS NOTES
Subjective:     Patient ID: Maryjane Mcintyre is a 73 y.o. female.    Chief Complaint: Diabetic Foot Exam (Diabetic routine exam, nail care, diabetic, wears casual shoes, last seen PCP Angelita Dorado on 03/11/24)    Maryjane is a 73 y.o. female who presents to the clinic for evaluation and treatment of high risk feet. Maryjane has a past medical history of Arthritis, Diabetes mellitus, type 2, GERD (gastroesophageal reflux disease), Gout, Hyperlipidemia, Hypertension, Onychomycosis, and PAD (peripheral artery disease). The patient's chief complaint is long, thick toenails. This patient has documented high risk feet requiring routine maintenance secondary to diabetes mellitis and those secondary complications of diabetes, as mentioned..    PCP: Angelita Dorado DO    Date Last Seen by PCP: 03/11/2024    Current shoe gear:  Affected Foot: Tennis shoes     Unaffected Foot: Tennis shoes    Hemoglobin A1C   Date Value Ref Range Status   03/02/2024 9.9 (H) 4.0 - 5.6 % Final     Comment:     ADA Screening Guidelines:  5.7-6.4%  Consistent with prediabetes  >or=6.5%  Consistent with diabetes    High levels of fetal hemoglobin interfere with the HbA1C  assay. Heterozygous hemoglobin variants (HbS, HgC, etc)do  not significantly interfere with this assay.   However, presence of multiple variants may affect accuracy.     09/15/2023 7.3 (H) 4.0 - 5.6 % Final     Comment:     ADA Screening Guidelines:  5.7-6.4%  Consistent with prediabetes  >or=6.5%  Consistent with diabetes    High levels of fetal hemoglobin interfere with the HbA1C  assay. Heterozygous hemoglobin variants (HbS, HgC, etc)do  not significantly interfere with this assay.   However, presence of multiple variants may affect accuracy.     05/24/2023 8.1 (H) 4.0 - 5.6 % Final     Comment:     ADA Screening Guidelines:  5.7-6.4%  Consistent with prediabetes  >or=6.5%  Consistent with diabetes    High levels of fetal hemoglobin interfere with the HbA1C  assay. Heterozygous  hemoglobin variants (HbS, HgC, etc)do  not significantly interfere with this assay.   However, presence of multiple variants may affect accuracy.         Patient Active Problem List   Diagnosis    Uncontrolled type 2 diabetes mellitus with hyperglycemia, with long-term current use of insulin    PAD (peripheral artery disease)    Baker cyst, left    Hypertension goal BP (blood pressure) < 130/80    Smoker    Hyperlipidemia LDL goal <70    Onychomycosis    Gout    Dysphagia    Osteopenia of multiple sites    Asymptomatic bilateral carotid artery stenosis    Cervical myelopathy    Neck pain    Hypokalemia    Hypomagnesemia    Diarrhea of presumed infectious origin    CVA (cerebral vascular accident)       Medication List with Changes/Refills   Current Medications    ACETAMINOPHEN (TYLENOL) 160 MG/5 ML LIQD    Take 100 mg by mouth daily as needed. Take one tablet by mouth once daily as needed    AMLODIPINE (NORVASC) 10 MG TABLET    Take 1 tablet by mouth once daily    CELECOXIB (CELEBREX) 200 MG CAPSULE    Take 1 capsule (200 mg total) by mouth once daily.    CHOLECALCIFEROL, VITAMIN D3, 5,000 UNIT CAPSULE    Take 5,000 Units by mouth once daily.    CILOSTAZOL (PLETAL) 50 MG TAB    Take 1 tablet (50 mg total) by mouth 2 (two) times daily.    EMPAGLIFLOZIN (JARDIANCE) 25 MG TABLET    Take 1 tablet (25 mg total) by mouth once daily.    INSULIN (LANTUS SOLOSTAR U-100 INSULIN) GLARGINE 100 UNITS/ML SUBQ PEN    Inject 8 Units into the skin every evening.    IPRATROPIUM-ALBUTEROL (COMBIVENT RESPIMAT)  MCG/ACTUATION INHALER    Inhale 1 puff into the lungs every 6 (six) hours as needed for Wheezing. Rescue    LABETALOL (NORMODYNE) 300 MG TABLET    TAKE 1 TABLET BY MOUTH EVERY 12 HOURS    LINAGLIPTIN (TRADJENTA) 5 MG TAB TABLET    Take 1 tablet (5 mg total) by mouth once daily.    LOSARTAN (COZAAR) 50 MG TABLET    Take 1 tablet (50 mg total) by mouth once daily.    OMEPRAZOLE (PRILOSEC) 20 MG CAPSULE    Take 1 capsule (20  "mg total) by mouth once daily.    OXYBUTYNIN (DITROPAN-XL) 5 MG TR24    Take 1 tablet by mouth once daily    PEN NEEDLE, DIABETIC (BD MAGDALENA 2ND GEN PEN NEEDLE) 32 GAUGE X 5/32" NDLE    USE TWICE DAILY    ROSUVASTATIN (CRESTOR) 40 MG TAB    Take 1 tablet (40 mg total) by mouth every evening.       Review of patient's allergies indicates:  No Known Allergies    Past Surgical History:   Procedure Laterality Date    Cardiac Echo      COLONOSCOPY N/A 10/19/2020    Procedure: COLONOSCOPY;  Surgeon: Yousuf Fuller MD;  Location: [facility] ENDO;  Service: Endoscopy;  Laterality: N/A;    ESOPHAGOGASTRODUODENOSCOPY N/A 10/19/2020    Procedure: ESOPHAGOGASTRODUODENOSCOPY (EGD);  Surgeon: Yousuf Fuller MD;  Location: [facility] ENDO;  Service: Endoscopy;  Laterality: N/A;    GALLBLADDER SURGERY      HYSTERECTOMY         Family History   Problem Relation Name Age of Onset    Diabetes Father      Hypertension Father      Heart attack Sister      Colon cancer Sister      Stroke Sister      Colon cancer Brother         Social History     Socioeconomic History    Marital status:    Tobacco Use    Smoking status: Former     Current packs/day: 1.00     Types: Cigarettes    Smokeless tobacco: Never    Tobacco comments:     Quit 2019 mid year   Substance and Sexual Activity    Alcohol use: No    Drug use: Never    Sexual activity: Not Currently     Social Determinants of Health     Financial Resource Strain: Low Risk  (3/4/2024)    Overall Financial Resource Strain (CARDIA)     Difficulty of Paying Living Expenses: Not very hard   Food Insecurity: No Food Insecurity (3/4/2024)    Hunger Vital Sign     Worried About Running Out of Food in the Last Year: Never true     Ran Out of Food in the Last Year: Never true   Transportation Needs: Unknown (3/4/2024)    PRAPARE - Transportation     Lack of Transportation (Non-Medical): No   Stress: No Stress Concern Present (3/4/2024)    [place] [employer] - Occupational " "Stress Questionnaire     Feeling of Stress : Not at all   Housing Stability: Low Risk  (3/4/2024)    Housing Stability Vital Sign     Unable to Pay for Housing in the Last Year: No     Number of Places Lived in the Last Year: 1     Unstable Housing in the Last Year: No       Vitals:    06/13/24 1510   Weight: 67.5 kg (148 lb 13 oz)   Height: 5' 4" (1.626 m)   PainSc: 0-No pain       Hemoglobin A1C   Date Value Ref Range Status   03/02/2024 9.9 (H) 4.0 - 5.6 % Final     Comment:     ADA Screening Guidelines:  5.7-6.4%  Consistent with prediabetes  >or=6.5%  Consistent with diabetes    High levels of fetal hemoglobin interfere with the HbA1C  assay. Heterozygous hemoglobin variants (HbS, HgC, etc)do  not significantly interfere with this assay.   However, presence of multiple variants may affect accuracy.     09/15/2023 7.3 (H) 4.0 - 5.6 % Final     Comment:     ADA Screening Guidelines:  5.7-6.4%  Consistent with prediabetes  >or=6.5%  Consistent with diabetes    High levels of fetal hemoglobin interfere with the HbA1C  assay. Heterozygous hemoglobin variants (HbS, HgC, etc)do  not significantly interfere with this assay.   However, presence of multiple variants may affect accuracy.     05/24/2023 8.1 (H) 4.0 - 5.6 % Final     Comment:     ADA Screening Guidelines:  5.7-6.4%  Consistent with prediabetes  >or=6.5%  Consistent with diabetes    High levels of fetal hemoglobin interfere with the HbA1C  assay. Heterozygous hemoglobin variants (HbS, HgC, etc)do  not significantly interfere with this assay.   However, presence of multiple variants may affect accuracy.         Review of Systems   Constitutional:  Negative for chills and fever.   Respiratory:  Negative for shortness of breath.    Cardiovascular:  Negative for chest pain, palpitations, orthopnea, claudication and leg swelling.   Gastrointestinal:  Negative for diarrhea, nausea and vomiting.   Musculoskeletal:  Negative for joint pain.   Skin:  Negative for " rash.   Neurological:  Negative for dizziness, tingling, sensory change, focal weakness and weakness.   Psychiatric/Behavioral: Negative.               Objective:      PHYSICAL EXAM: Apperance: Alert and orient in no distress,well developed, and with good attention to grooming and body habits  Patient presents ambulating in tennis shoes.   LOWER EXTREMITY EXAM:  VASCULAR: Dorsalis pedis pulses 0/4 bilateral and Posterior Tibial pulses 1/4 bilateral. Capillary fill time <blanched bilateral. Mild edema observed bilateral. Varicosities present bilateral. Skin temperature of the lower extremities is warm to warm, proximal to distal. Hair growth absent bilateral.  DERMATOLOGICAL: No skin rashes, subcutaneous nodules, lesions, or ulcers observed bilateral. Nails 1,2,3,4,5 bilateral elongated, thickened, and discolored with subungual debris. Webspaces 1,2,3,4 bilateral clean, dry and without evidence of break in skin integrity.   NEUROLOGICAL: Light touch, sharp-dull, proprioception all present and equal bilaterally.  Vibratory sensation diminished. Protective sensation absent sites as tested with a [place] 5.07 monofilament.   MUSCULOSKELETAL: Muscle strength is 5/5 for foot inverters, everters, plantarflexors, and dorsiflexors. Muscle tone is normal.         Assessment:       ICD-10-CM ICD-9-CM   1. Controlled type 2 DM with peripheral circulatory disorder  E11.51 250.70     443.81   2. PAD (peripheral artery disease)  I73.9 443.9   3. Dermatophytosis of nail  B35.1 110.1         Plan:   Controlled type 2 DM with peripheral circulatory disorder    PAD (peripheral artery disease)    Dermatophytosis of nail      I counseled the patient on her conditions, regarding findings of my examination, my impressions, and usual treatment plan.   Appointment spent on education about the diabetic foot, neuropathy, and prevention of limb loss.  Shoe inspection. Diabetic Foot Education. Patient reminded of the importance of good  nutrition and blood sugar control to help prevent podiatric complications of diabetes. Patient instructed on proper foot hygeine. We discussed wearing proper shoe gear, daily foot inspections, never walking without protective shoe gear, never putting sharp instruments to feet.    With patient's permission, nails 1-5 bilateral were debrided/trimmed in length and thickness aggressively to their soft tissue attachment mechanically and with electric , removing all offending nail and debris. Patient relates relief following the procedure.  Patient  will continue to monitor the areas daily, inspect feet, wear protective shoe gear when ambulatory, moisturizer to maintain skin integrity. Patient reminded of the importance of good nutrition and blood sugar control to help prevent podiatric complications of diabetes.  Patient to return 4 months or sooner if needed.              Jacek Dooley DPM  [facility]

## 2024-06-13 NOTE — PROGRESS NOTES
Subjective:   Patient ID:  Maryjane Mcintyre is a 73 y.o. female who presents for follow up of No chief complaint on file.      69 yo female, routine f/u  PMH HTN, PAD, and HLD. H/o CVA in , DM. S/p cervical fusion procedure after fall. smoking for 50 yrs, quit now. No drinking.   Echo in  normal EF, LAE and LVH  Holter no arrhythmia  ekg today NSR and nonspecific STT change   LE arterial US 50-75% stenosis in the right proximal femoral artery and left popliteal artery. And left Thompson's cyst. +plauqe   DIMITRIOS  Right 0.81 and left 0.87  C/oknee pain, L> R. Occurred at walk. No calf pain and lower back pain.  No leg weakness, resting pain and coldness.  No chest pain, palpitation, faint and orthopnea.  FREDERICK chronic  BP LDL and A1c conreolled     05/23 visit  Last seen in . H/o CVA in  and Rx for DM.  Quit smoking in 2019.  On home PT pending  No chest pain dyspnea syncope. Some feet swelling. Sleeps on 2 pillows  Occasional dizziness and fall   LDL 61 in 02-23 at [facility]  02-23 echo done at [facility]: ef 65% mild TR and negative bubble study    Interval history  Walker dependent after CVA in 01.23. no fainting syncope chest pain orthopnea leg swelling  03/24 echo EF nml, AV calcification  03/24 admitted for K 2.1 and 3rd EKG NSR RBBB  Today EKG reviewed by myself today reveals NSR LVH narrow QRS            Past Medical History:   Diagnosis Date    Arthritis     Diabetes mellitus, type 2     GERD (gastroesophageal reflux disease)     Gout     Hyperlipidemia     Hypertension     Onychomycosis     PAD (peripheral artery disease)        Past Surgical History:   Procedure Laterality Date    Cardiac Echo      COLONOSCOPY N/A 10/19/2020    Procedure: COLONOSCOPY;  Surgeon: Yousuf Fuller MD;  Location: [facility];  Service: Endoscopy;  Laterality: N/A;    ESOPHAGOGASTRODUODENOSCOPY N/A 10/19/2020    Procedure: ESOPHAGOGASTRODUODENOSCOPY (EGD);  Surgeon: Yousuf Fuller MD;  Location: [facility]  ENDO;  Service: Endoscopy;  Laterality: N/A;    GALLBLADDER SURGERY      HYSTERECTOMY         Social History     Tobacco Use    Smoking status: Former     Current packs/day: 1.00     Types: Cigarettes    Smokeless tobacco: Never    Tobacco comments:     Quit 2019 mid year   Substance Use Topics    Alcohol use: No    Drug use: Never       Family History   Problem Relation Name Age of Onset    Diabetes Father      Hypertension Father      Heart attack Sister      Colon cancer Sister      Stroke Sister      Colon cancer Brother           ROS    Objective:   Physical Exam  HENT:      Head: Normocephalic.   Eyes:      Pupils: Pupils are equal, round, and reactive to light.   Neck:      Thyroid: No thyromegaly.      Vascular: Normal carotid pulses. No carotid bruit or JVD.   Cardiovascular:      Rate and Rhythm: Normal rate and regular rhythm. No extrasystoles are present.     Chest Wall: PMI is not displaced.      Pulses: Normal pulses.      Heart sounds: Murmur (ESM 2/6 on RUSB and mid LSB) heard.      No gallop. No S3 sounds.   Pulmonary:      Effort: No respiratory distress.      Breath sounds: No stridor.   Abdominal:      General: Bowel sounds are normal.      Palpations: Abdomen is soft.      Tenderness: There is no abdominal tenderness. There is no rebound.   Skin:     Findings: No rash.   Neurological:      Mental Status: She is alert and oriented to person, place, and time.   Psychiatric:         Behavior: Behavior normal.         Lab Results   Component Value Date    CHOL 92 (L) 03/02/2024    CHOL 120 09/15/2023    CHOL 98 (L) 05/24/2023     Lab Results   Component Value Date    HDL 31 (L) 03/02/2024    HDL 42 09/15/2023    HDL 34 (L) 05/24/2023     Lab Results   Component Value Date    LDLCALC 41.2 (L) 03/02/2024    LDLCALC 66.6 09/15/2023    LDLCALC 51.0 (L) 05/24/2023     Lab Results   Component Value Date    TRIG 99 03/02/2024    TRIG 57 09/15/2023    TRIG 65 05/24/2023     Lab Results   Component Value  "Date    CHOLHDL 33.7 03/02/2024    CHOLHDL 35.0 09/15/2023    CHOLHDL 34.7 05/24/2023       Chemistry        Component Value Date/Time     03/04/2024 1119    K 3.7 03/09/2024 1013     (H) 03/04/2024 1119    CO2 19 (L) 03/04/2024 1119    BUN 14 03/04/2024 1119    CREATININE 1.7 (H) 03/04/2024 1119     (H) 03/04/2024 1119        Component Value Date/Time    CALCIUM 8.5 (L) 03/04/2024 1119    ALKPHOS 39 (L) 03/03/2024 0816    AST 44 (H) 03/03/2024 0816    ALT 56 (H) 03/03/2024 0816    BILITOT 0.5 03/03/2024 0816    ESTGFRAFRICA 93 02/14/2023 0457    ESTGFRAFRICA >60.0 04/12/2022 1108    EGFRNONAA >60.0 04/12/2022 1108          Lab Results   Component Value Date    HGBA1C 9.9 (H) 03/02/2024     No results found for: "TSH"  Lab Results   Component Value Date    INR 1.0 02/07/2023     Lab Results   Component Value Date    WBC 4.13 03/03/2024    HGB 8.8 (L) 03/03/2024    HCT 27.3 (L) 03/03/2024    MCV 81 (L) 03/03/2024     03/03/2024     BMP  Sodium   Date Value Ref Range Status   03/04/2024 140 136 - 145 mmol/L Final     Potassium   Date Value Ref Range Status   03/09/2024 3.7 3.5 - 5.1 mmol/L Final     Chloride   Date Value Ref Range Status   03/04/2024 113 (H) 95 - 110 mmol/L Final     CO2   Date Value Ref Range Status   03/04/2024 19 (L) 23 - 29 mmol/L Final     BUN   Date Value Ref Range Status   03/04/2024 14 8 - 23 mg/dL Final     Creatinine   Date Value Ref Range Status   03/04/2024 1.7 (H) 0.5 - 1.4 mg/dL Final     Calcium   Date Value Ref Range Status   03/04/2024 8.5 (L) 8.7 - 10.5 mg/dL Final     Anion Gap   Date Value Ref Range Status   03/04/2024 8 8 - 16 mmol/L Final     eGFR if    Date Value Ref Range Status   04/12/2022 >60.0 >60 mL/min/1.73 m^2 Final     eGFR    Date Value Ref Range Status   02/14/2023 93 mL/min/1.73mSq Final     Comment:     In accordance with NKF-ASN Task Force recommendation, calculation based on the Chronic Kidney Disease " Epidemiology Collaboration (CKD-EPI) equation without adjustment for race. eGFR adjusted for gender and age and calculated in ml/min/1.73mSquared. eGFR cannot be calculated if patient is under 18 years of age.     Reference Range:   >= 60 ml/min/1.73mSquared.     eGFR if non    Date Value Ref Range Status   04/12/2022 >60.0 >60 mL/min/1.73 m^2 Final     Comment:     Calculation used to obtain the estimated glomerular filtration  rate (eGFR) is the CKD-EPI equation.        BNP  @LABRCNTIP(BNP,BNPTRIAGEBLO)@  @LABRCNTIP(troponini)@  CrCl cannot be calculated (Patient's most recent lab result is older than the maximum 7 days allowed.).  No results found in the last 24 hours.  No results found in the last 24 hours.  No results found in the last 24 hours.    Assessment:      1. Hyperlipidemia LDL goal <70    2. PAD (peripheral artery disease)    3. Hypertension goal BP (blood pressure) < 130/80    4. Cerebrovascular accident (CVA) due to other mechanism        Plan:   Hyperlipidemia LDL goal <70    PAD (peripheral artery disease)    Hypertension goal BP (blood pressure) < 130/80    Cerebrovascular accident (CVA) due to other mechanism        Continue statin Amlodipine labetolol losartan pletal   DM Rx per PCP  Counseled DASH  Check Lipid profile with PCP in 6 months  Recommend heart-healthy diet, weight control and regular exercise.  Bijan. Risk modification.   I have reviewed all pertinent labs and cardiac studies independently. Plans and recommendations have been formulated under my direct supervision. All questions answered and patient voiced understanding.   If symptoms persist go to the ED  RTC in 12 months

## 2024-06-17 ENCOUNTER — TELEPHONE (OUTPATIENT)
Dept: CARDIOLOGY | Facility: CLINIC | Age: 74
End: 2024-06-17
Payer: MEDICARE

## 2024-06-17 DIAGNOSIS — I10 HYPERTENSION GOAL BP (BLOOD PRESSURE) < 130/80: Primary | ICD-10-CM

## 2024-06-17 DIAGNOSIS — I63.89 CEREBROVASCULAR ACCIDENT (CVA) DUE TO OTHER MECHANISM: ICD-10-CM

## 2024-06-17 LAB
OHS QRS DURATION: 88 MS
OHS QTC CALCULATION: 471 MS

## 2024-06-17 NOTE — TELEPHONE ENCOUNTER
Done and scheduled        ----- Message from An Villarreal MA sent at 6/17/2024  2:51 PM CDT -----  Regarding: ekg  Please send EKG order for pt, thanks.

## 2024-06-20 ENCOUNTER — EXTERNAL HOME HEALTH (OUTPATIENT)
Dept: HOME HEALTH SERVICES | Facility: HOSPITAL | Age: 74
End: 2024-06-20
Payer: MEDICARE

## 2024-06-30 ENCOUNTER — DOCUMENT SCAN (OUTPATIENT)
Dept: HOME HEALTH SERVICES | Facility: HOSPITAL | Age: 74
End: 2024-06-30
Payer: MEDICARE

## 2024-07-15 ENCOUNTER — PATIENT OUTREACH (OUTPATIENT)
Dept: ADMINISTRATIVE | Facility: HOSPITAL | Age: 74
End: 2024-07-15
Payer: MEDICARE

## 2024-07-15 NOTE — PROGRESS NOTES
VB Score: 1     Eye Exam    Tetanus Vaccine  Shingles/Zoster Vaccine  RSV Vaccine            Health Maintenance Topic(s) Outreach Outcomes & Actions Taken:    Eye Exam - Outreach Outcomes & Actions Taken  : Pt Will Schedule with External Provider / Order Routed & Care Team Updated if Applicable spoke with Taurus (daughter) she states pt had eye exam scheduled, that was cancelled due to being sick. She said they would call to reschedule appt.       Additional Notes:  Pt has needed lab scheduled, 8/10/24 with follow up and mammo scheduled, 8/15/24.       Care Management, Digital Medicine, and/or Education Referrals    OPCM Risk Score: 51         Next Steps - Referral Actions: Digital Medicine Outcomes and Actions Taken: Pt Declined or Not Eligible        Additional Notes:  [facility] reviewed March 2024.   Not eligible for Dig Med.   No referrals placed.

## 2024-08-05 ENCOUNTER — PATIENT OUTREACH (OUTPATIENT)
Dept: ADMINISTRATIVE | Facility: HOSPITAL | Age: 74
End: 2024-08-05
Payer: MEDICARE

## 2024-08-15 ENCOUNTER — HOSPITAL ENCOUNTER (OUTPATIENT)
Dept: RADIOLOGY | Facility: HOSPITAL | Age: 74
Discharge: HOME | End: 2024-08-15
Attending: INTERNAL MEDICINE
Payer: MEDICARE

## 2024-08-15 ENCOUNTER — OFFICE VISIT (OUTPATIENT)
Dept: INTERNAL MEDICINE | Facility: CLINIC | Age: 74
End: 2024-08-15
Payer: MEDICARE

## 2024-08-15 VITALS
HEART RATE: 75 BPM | OXYGEN SATURATION: 98 % | RESPIRATION RATE: 21 BRPM | SYSTOLIC BLOOD PRESSURE: 118 MMHG | DIASTOLIC BLOOD PRESSURE: 70 MMHG | BODY MASS INDEX: 24.62 KG/M2 | WEIGHT: 143.44 LBS

## 2024-08-15 DIAGNOSIS — E78.5 HYPERLIPIDEMIA LDL GOAL <70: ICD-10-CM

## 2024-08-15 DIAGNOSIS — E11.65 UNCONTROLLED TYPE 2 DIABETES MELLITUS WITH HYPERGLYCEMIA, WITH LONG-TERM CURRENT USE OF INSULIN: ICD-10-CM

## 2024-08-15 DIAGNOSIS — I10 HYPERTENSION GOAL BP (BLOOD PRESSURE) < 130/80: Primary | ICD-10-CM

## 2024-08-15 DIAGNOSIS — Z79.4 UNCONTROLLED TYPE 2 DIABETES MELLITUS WITH HYPERGLYCEMIA, WITH LONG-TERM CURRENT USE OF INSULIN: ICD-10-CM

## 2024-08-15 DIAGNOSIS — Z12.31 ENCOUNTER FOR SCREENING MAMMOGRAM FOR MALIGNANT NEOPLASM OF BREAST: ICD-10-CM

## 2024-08-15 PROCEDURE — 1101F PT FALLS ASSESS-DOCD LE1/YR: CPT | Mod: CPTII,S$GLB,, | Performed by: PHYSICIAN ASSISTANT

## 2024-08-15 PROCEDURE — 99214 OFFICE O/P EST MOD 30 MIN: CPT | Mod: S$GLB,,, | Performed by: PHYSICIAN ASSISTANT

## 2024-08-15 PROCEDURE — 3078F DIAST BP <80 MM HG: CPT | Mod: CPTII,S$GLB,, | Performed by: PHYSICIAN ASSISTANT

## 2024-08-15 PROCEDURE — 3288F FALL RISK ASSESSMENT DOCD: CPT | Mod: CPTII,S$GLB,, | Performed by: PHYSICIAN ASSISTANT

## 2024-08-15 PROCEDURE — 1159F MED LIST DOCD IN RCRD: CPT | Mod: CPTII,S$GLB,, | Performed by: PHYSICIAN ASSISTANT

## 2024-08-15 PROCEDURE — 3074F SYST BP LT 130 MM HG: CPT | Mod: CPTII,S$GLB,, | Performed by: PHYSICIAN ASSISTANT

## 2024-08-15 PROCEDURE — G2211 COMPLEX E/M VISIT ADD ON: HCPCS | Mod: S$GLB,,, | Performed by: PHYSICIAN ASSISTANT

## 2024-08-15 PROCEDURE — 77067 SCR MAMMO BI INCL CAD: CPT | Mod: 26,,, | Performed by: RADIOLOGY

## 2024-08-15 PROCEDURE — 4010F ACE/ARB THERAPY RXD/TAKEN: CPT | Mod: CPTII,S$GLB,, | Performed by: PHYSICIAN ASSISTANT

## 2024-08-15 PROCEDURE — 77067 SCR MAMMO BI INCL CAD: CPT | Mod: TC

## 2024-08-15 PROCEDURE — 1126F AMNT PAIN NOTED NONE PRSNT: CPT | Mod: CPTII,S$GLB,, | Performed by: PHYSICIAN ASSISTANT

## 2024-08-15 PROCEDURE — 99999 PR PBB SHADOW E&M-EST. PATIENT-LVL IV: CPT | Mod: PBBFAC,,, | Performed by: PHYSICIAN ASSISTANT

## 2024-08-15 PROCEDURE — 3046F HEMOGLOBIN A1C LEVEL >9.0%: CPT | Mod: CPTII,S$GLB,, | Performed by: PHYSICIAN ASSISTANT

## 2024-08-15 PROCEDURE — 77063 BREAST TOMOSYNTHESIS BI: CPT | Mod: 26,,, | Performed by: RADIOLOGY

## 2024-08-15 PROCEDURE — 1160F RVW MEDS BY RX/DR IN RCRD: CPT | Mod: CPTII,S$GLB,, | Performed by: PHYSICIAN ASSISTANT

## 2024-08-15 PROCEDURE — 3008F BODY MASS INDEX DOCD: CPT | Mod: CPTII,S$GLB,, | Performed by: PHYSICIAN ASSISTANT

## 2024-08-15 PROCEDURE — 77063 BREAST TOMOSYNTHESIS BI: CPT | Mod: TC

## 2024-08-15 NOTE — PROGRESS NOTES
Subjective:      Patient ID: Maryjane Mcintyre is a 74 y.o. female.    Chief Complaint: Follow-up (She is here for a follow up visit. States that she is still dealing with random occasions of diarrhea, possibly related to what she eats. )    Patient is known to me, being seen today for follow up.     HTN- labetalol 300mg bid, losartan 50mg, amlodipine 10mg, HCTZ 12.5mg   HLD- on statin therapy   DM- A1c 9.9%, linagliptin 5mg, jardiance 25mg, lantus 8 units   Sugar 180-220s, admits diet is poor     Due for labs     Ongoing intermittent diarrhea, improved somewhat since d/c metformin   May occur once every 2 weeks or so, may be diet dependent     Last visit March 2024 w PCP.       Review of Systems   Constitutional:  Negative for chills, diaphoresis and fever.   HENT:  Negative for congestion, rhinorrhea and sore throat.    Respiratory:  Negative for cough, shortness of breath and wheezing.    Gastrointestinal:  Positive for constipation (rare) and diarrhea (rare). Negative for abdominal pain, blood in stool, nausea and vomiting.   Skin:  Negative for rash.   Neurological:  Negative for dizziness, light-headedness and headaches.       Objective:   /70 (BP Location: Left arm, Patient Position: Sitting, BP Method: Medium (Manual))   Pulse 75   Resp (!) 21   Wt 65.1 kg (143 lb 6.6 oz)   SpO2 98%   BMI 24.62 kg/m²   Physical Exam  Constitutional:       General: She is not in acute distress.     Appearance: Normal appearance. She is well-developed. She is not ill-appearing.   HENT:      Head: Normocephalic and atraumatic.   Cardiovascular:      Rate and Rhythm: Normal rate and regular rhythm.      Heart sounds: Normal heart sounds. No murmur heard.  Pulmonary:      Effort: Pulmonary effort is normal. No respiratory distress.      Breath sounds: Normal breath sounds. No decreased breath sounds.   Abdominal:      General: Bowel sounds are normal.      Palpations: Abdomen is soft.      Tenderness: There is no abdominal  tenderness.   Musculoskeletal:      Right lower leg: No edema.      Left lower leg: No edema.   Skin:     General: Skin is warm and dry.      Findings: No rash.   Psychiatric:         Speech: Speech normal.         Behavior: Behavior normal.         Thought Content: Thought content normal.       Assessment:      1. Hypertension goal BP (blood pressure) < 130/80    2. Hyperlipidemia LDL goal <70    3. Uncontrolled type 2 diabetes mellitus with hyperglycemia, with long-term current use of insulin       Plan:   Hypertension goal BP (blood pressure) < 130/80  -     CBC Auto Differential; Future; Expected date: 08/15/2024  -     Comprehensive Metabolic Panel; Future; Expected date: 08/15/2024    Hyperlipidemia LDL goal <70  -     Lipid Panel; Future; Expected date: 08/15/2024    Uncontrolled type 2 diabetes mellitus with hyperglycemia, with long-term current use of insulin  -     Hemoglobin A1C; Future; Expected date: 08/15/2024  -     Ambulatory referral/consult to Diabetic Advanced Practice Providers (Medical Management); Future; Expected date: 08/22/2024  -     Microalbumin/Creatinine Ratio, Urine; Future; Expected date: 08/15/2024      Schedule eye exam ([facility]), appt coming up     Declines dietician referral     Keep food journals when diarrhea episodes occur  If more frequent, f/u GI specialist     Will likely need diabetic med adjustments pending A1c     Fasting labs today     3mth f/u

## 2024-08-19 ENCOUNTER — TELEPHONE (OUTPATIENT)
Dept: INTERNAL MEDICINE | Facility: CLINIC | Age: 74
End: 2024-08-19
Payer: MEDICARE

## 2024-08-19 DIAGNOSIS — Z79.4 UNCONTROLLED TYPE 2 DIABETES MELLITUS WITH HYPERGLYCEMIA, WITH LONG-TERM CURRENT USE OF INSULIN: Primary | ICD-10-CM

## 2024-08-19 DIAGNOSIS — E11.65 UNCONTROLLED TYPE 2 DIABETES MELLITUS WITH HYPERGLYCEMIA, WITH LONG-TERM CURRENT USE OF INSULIN: Primary | ICD-10-CM

## 2024-08-19 NOTE — TELEPHONE ENCOUNTER
----- Message from Nayeli Vargas MA sent at 8/19/2024  1:57 PM CDT -----  Regarding: FW: Urine Specimen  Please reorder. Pts daughter they would drop off urine some time in the next few days.  ----- Message -----  From: Anastasiia Saab  Sent: 8/19/2024   1:42 PM CDT  To: [facility] Staff  Subject: Urine Specimen                                   Unable to collect a urine sample at time of lab appointment. Please reorder and reschedule.

## 2024-08-22 ENCOUNTER — DOCUMENT SCAN (OUTPATIENT)
Dept: HOME HEALTH SERVICES | Facility: HOSPITAL | Age: 74
End: 2024-08-22

## 2024-08-22 ENCOUNTER — DOCUMENT SCAN (OUTPATIENT)
Dept: HOME HEALTH SERVICES | Facility: HOSPITAL | Age: 74
End: 2024-08-22
Payer: MEDICARE

## 2024-08-22 ENCOUNTER — EXTERNAL HOME HEALTH (OUTPATIENT)
Dept: HOME HEALTH SERVICES | Facility: HOSPITAL | Age: 74
End: 2024-08-22

## 2024-08-30 DIAGNOSIS — I10 HYPERTENSION GOAL BP (BLOOD PRESSURE) < 130/80: ICD-10-CM

## 2024-08-31 RX ORDER — LOSARTAN POTASSIUM 50 MG/1
50 TABLET ORAL
Qty: 90 TABLET | Refills: 1 | Status: SHIPPED | OUTPATIENT
Start: 2024-08-31

## 2024-08-31 NOTE — TELEPHONE ENCOUNTER
Refill Authorization Note     Refill Decision Note   Maryjane Mcintyre  is requesting a refill authorization.  Brief Assessment and Rationale for Refill:  Approve     Medication Therapy Plan:       Medication Reconciliation Completed: No   Comments:     No Care Gaps recommended.     Note composed:11:57 AM 08/31/2024

## 2024-08-31 NOTE — TELEPHONE ENCOUNTER
No care due was identified.  [facility] Embedded Care Due Messages. Reference number: [id number].   8/30/2024 9:54:26 PM CDT

## 2024-09-04 DIAGNOSIS — E11.65 UNCONTROLLED TYPE 2 DIABETES MELLITUS WITH HYPERGLYCEMIA, WITH LONG-TERM CURRENT USE OF INSULIN: ICD-10-CM

## 2024-09-04 DIAGNOSIS — Z79.4 UNCONTROLLED TYPE 2 DIABETES MELLITUS WITH HYPERGLYCEMIA, WITH LONG-TERM CURRENT USE OF INSULIN: ICD-10-CM

## 2024-09-04 NOTE — TELEPHONE ENCOUNTER
No care due was identified.  [facility] Embedded Care Due Messages. Reference number: [id number].   9/04/2024 6:08:02 AM CDT

## 2024-09-05 RX ORDER — LINAGLIPTIN 5 MG/1
5 TABLET, FILM COATED ORAL
Qty: 90 TABLET | Refills: 0 | Status: SHIPPED | OUTPATIENT
Start: 2024-09-05

## 2024-09-06 RX ORDER — EMPAGLIFLOZIN 25 MG/1
25 TABLET, FILM COATED ORAL
Qty: 90 TABLET | Refills: 1 | Status: SHIPPED | OUTPATIENT
Start: 2024-09-06

## 2024-09-06 NOTE — TELEPHONE ENCOUNTER
Refill Decision Note   Maryjane Mcintyre  is requesting a refill authorization.  Brief Assessment and Rationale for Refill:  Approve     Medication Therapy Plan:         Alert overridden per protocol: Yes   Pharmacist review requested: Yes   Extended chart review required: Yes   Comments:     Note composed:2:05 AM 09/06/2024

## 2024-09-06 NOTE — TELEPHONE ENCOUNTER
Refill Routing Note   Medication(s) are not appropriate for processing by [facility] for the following reason(s):        Drug-disease interaction    [facility] action(s):  Defer  Approve      Medication Therapy Plan: Drug-Disease: JARDIANCE and PAD (peripheral artery disease)    Alert overridden per protocol: Yes   Pharmacist review requested: Yes     Appointments  past 12m or future 3m with PCP    Date Provider   Last Visit   3/11/2024 Angelita Dorado DO   Next Visit   2/18/2025 Angelita Dorado DO   ED visits in past 90 days: 0        Note composed:7:26 PM 09/05/2024

## 2024-09-16 PROBLEM — I63.9 CVA (CEREBRAL VASCULAR ACCIDENT): Status: RESOLVED | Noted: 2024-06-13 | Resolved: 2024-09-16

## 2024-09-18 ENCOUNTER — E-VISIT (OUTPATIENT)
Dept: INTERNAL MEDICINE | Facility: CLINIC | Age: 74
End: 2024-09-18
Payer: MEDICARE

## 2024-09-18 ENCOUNTER — TELEPHONE (OUTPATIENT)
Dept: INTERNAL MEDICINE | Facility: CLINIC | Age: 74
End: 2024-09-18
Payer: MEDICARE

## 2024-09-18 DIAGNOSIS — R82.90 ABNORMAL URINE ODOR: Primary | ICD-10-CM

## 2024-09-18 NOTE — TELEPHONE ENCOUNTER
----- Message from Ellen Dooley sent at 9/18/2024  9:02 AM CDT -----  Contact: [phone]  Patient daughter is calling [facility] asking for a urine test for a UTI

## 2024-09-18 NOTE — PROGRESS NOTES
Patient ID: Maryjane Mcintyre is a 74 y.o. female.    Chief Complaint: Urinary Tract Infection (Entered automatically based on patient selection in [facility].)    The patient initiated a request through [facility] on 9/18/2024 for evaluation and management with a chief complaint of Urinary Tract Infection (Entered automatically based on patient selection in [facility].)     I evaluated the questionnaire submission on 9/18/2024.    [facility] P[facility] Evisit Uti Questionnaire    9/18/2024  9:55 AM CDT - Filed by Patient   Do you agree to participate in an E-Visit? Yes   If you have any of the following symptoms, please present to your local emergency room or call 911:  I acknowledge   Choose the state of your primary residence [place]   What is the main issue you would like addressed today? concerns of a uti   What symptoms do you currently have? Change in urine appearance or smell   When did your symptoms first appear? 8/1/2024   List what you have done or taken to help your symptoms. tired drinking more water   Please indicate whether you have had the following symptoms during the past 24 hours     Urgent urination (a sudden and uncontrollable urge to urinate) Mild   Frequent urination of small amounts of urine (going to the toilet very often) None   Burning pain when urinating None   Incomplete bladder emptying (still feel like you need to urinate again after urination) Mild   Pain not associated with urination in the lower abdomen below the belly button) None   What does your urine look like? Cloudy   Blood seen in the urine None   Flank pain (pain in one or both sides of the lower back) None   Abnormal Vaginal Discharge (abnormal amount, color and/or odor) None   Discharge from the urethra (urinary opening) without urination None   High body temperature/fever? None-<99.5   Please rate how much discomfort you have experience because of the symptoms in the past 24 hours: Mild   Please indicate how the symptoms have interfered with your  every day activities/work in the past 24 hours: Mild   Please indicate how these symptoms have interfered with your social activities (visiting people, meeting with friends, etc.) in the past 24 hours? Mild   Are you a diabetic? Yes   Please indicate whether you have the following at the time of completion of this questionnaire: None of the above   Provide any additional information you feel is important. strong smell to urine and like a cloudy layer to it   Please attach any relevant images or files (if you have performed a home test for UTI, please submit a photo of results)    Are you able to take your vital signs? No         Encounter Diagnosis   Name Primary?    Abnormal urine odor Yes        Orders Placed This Encounter   Procedures    Urinalysis     Standing Status:   Future     Standing Expiration Date:   11/17/2025     Order Specific Question:   Collection Type     Answer:   Urine, Clean Catch            No follow-ups on file.      E-Visit Time Tracking:    Day 1 Time (in minutes): 5    Total Time (in minutes): 5

## 2024-09-21 ENCOUNTER — LAB VISIT (OUTPATIENT)
Dept: LAB | Facility: HOSPITAL | Age: 74
End: 2024-09-21
Payer: MEDICARE

## 2024-09-21 DIAGNOSIS — R82.90 ABNORMAL URINE ODOR: ICD-10-CM

## 2024-09-21 LAB
BACTERIA #/AREA URNS HPF: ABNORMAL /HPF
BILIRUB UR QL STRIP: NEGATIVE
CLARITY UR: ABNORMAL
COLOR UR: YELLOW
GLUCOSE UR QL STRIP: ABNORMAL
HGB UR QL STRIP: NEGATIVE
KETONES UR QL STRIP: NEGATIVE
LEUKOCYTE ESTERASE UR QL STRIP: ABNORMAL
MICROSCOPIC COMMENT: ABNORMAL
NITRITE UR QL STRIP: NEGATIVE
PH UR STRIP: 6 [PH] (ref 5–8)
PROT UR QL STRIP: NEGATIVE
RBC #/AREA URNS HPF: 5 /HPF (ref 0–4)
SP GR UR STRIP: 1.01 (ref 1–1.03)
SQUAMOUS #/AREA URNS HPF: 5 /HPF
URN SPEC COLLECT METH UR: ABNORMAL
WBC #/AREA URNS HPF: 48 /HPF (ref 0–5)
YEAST URNS QL MICRO: ABNORMAL

## 2024-09-21 PROCEDURE — 81000 URINALYSIS NONAUTO W/SCOPE: CPT | Performed by: INTERNAL MEDICINE

## 2024-09-27 ENCOUNTER — TELEPHONE (OUTPATIENT)
Dept: DIABETES | Facility: CLINIC | Age: 74
End: 2024-09-27
Payer: MEDICARE

## 2024-10-01 ENCOUNTER — TELEPHONE (OUTPATIENT)
Dept: INTERNAL MEDICINE | Facility: CLINIC | Age: 74
End: 2024-10-01
Payer: MEDICARE

## 2024-10-01 DIAGNOSIS — R82.90 ABNORMAL URINALYSIS: Primary | ICD-10-CM

## 2024-10-11 ENCOUNTER — PATIENT OUTREACH (OUTPATIENT)
Dept: ADMINISTRATIVE | Facility: HOSPITAL | Age: 74
End: 2024-10-11
Payer: MEDICARE

## 2024-10-11 DIAGNOSIS — E78.5 HYPERLIPIDEMIA LDL GOAL <70: ICD-10-CM

## 2024-10-11 NOTE — PROGRESS NOTES
VBC activation completed.    VBHM Score: 2     Osteoporosis Screening  Eye Exam    Influenza Vaccine  Tetanus Vaccine  Shingles/Zoster Vaccine  RSV Vaccine            LM offering to schedule Dexa Scan and asking if DM Eye exam has been completed this year.

## 2024-10-11 NOTE — PROGRESS NOTES
Pts daughter-Taurus- returned my call to schedule appt.   Dexa Scan scheduled, 11/07/24 prior to podiatry appt.  AWV scheduled, 11/15/24 after follow up appt.  Daughter said she has not had an eye exam this year. Encouraged to schedule appt with outside dr or here at [facility].

## 2024-10-11 NOTE — TELEPHONE ENCOUNTER
LOV 8/15/24 Mag Fraire PA-C  NOV 11/15/24 Mag Fraire PA-C    Refill request received via fax from pharmacy.

## 2024-10-12 NOTE — TELEPHONE ENCOUNTER
Refill Routing Note   Medication(s) are not appropriate for processing by [facility] for the following reason(s):      Non-participating provider    [facility] action(s):  Route Care Due:  None identified            Appointments  past 12m or future 3m with PCP    Date Provider   Last Visit   8/15/2024 Mag Fraire PA-C   Next Visit   11/15/2024 Mag Fraire PA-C   ED visits in past 90 days: 0        Note composed:12:48 PM 10/12/2024

## 2024-10-14 RX ORDER — ROSUVASTATIN CALCIUM 40 MG/1
40 TABLET, COATED ORAL NIGHTLY
Qty: 90 TABLET | Refills: 1 | Status: SHIPPED | OUTPATIENT
Start: 2024-10-14

## 2024-10-23 DIAGNOSIS — Z78.0 MENOPAUSE: ICD-10-CM

## 2024-10-31 ENCOUNTER — PATIENT MESSAGE (OUTPATIENT)
Dept: PODIATRY | Facility: CLINIC | Age: 74
End: 2024-10-31
Payer: MEDICARE

## 2024-11-04 ENCOUNTER — PATIENT OUTREACH (OUTPATIENT)
Dept: ADMINISTRATIVE | Facility: HOSPITAL | Age: 74
End: 2024-11-04
Payer: MEDICARE

## 2024-11-07 ENCOUNTER — TELEPHONE (OUTPATIENT)
Dept: ADMINISTRATIVE | Facility: CLINIC | Age: 74
End: 2024-11-07
Payer: MEDICARE

## 2024-11-07 ENCOUNTER — TELEPHONE (OUTPATIENT)
Dept: DIABETES | Facility: CLINIC | Age: 74
End: 2024-11-07
Payer: MEDICARE

## 2024-11-09 ENCOUNTER — LAB VISIT (OUTPATIENT)
Dept: LAB | Facility: HOSPITAL | Age: 74
End: 2024-11-09
Attending: PHYSICIAN ASSISTANT
Payer: MEDICARE

## 2024-11-09 DIAGNOSIS — I10 HYPERTENSION GOAL BP (BLOOD PRESSURE) < 130/80: ICD-10-CM

## 2024-11-09 DIAGNOSIS — Z79.4 TYPE 2 DIABETES MELLITUS WITHOUT COMPLICATION, WITH LONG-TERM CURRENT USE OF INSULIN: ICD-10-CM

## 2024-11-09 DIAGNOSIS — E78.5 HYPERLIPIDEMIA LDL GOAL <70: ICD-10-CM

## 2024-11-09 DIAGNOSIS — E11.9 TYPE 2 DIABETES MELLITUS WITHOUT COMPLICATION, WITH LONG-TERM CURRENT USE OF INSULIN: ICD-10-CM

## 2024-11-09 LAB
ALBUMIN SERPL BCP-MCNC: 4 G/DL (ref 3.5–5.2)
ALP SERPL-CCNC: 48 U/L (ref 40–150)
ALT SERPL W/O P-5'-P-CCNC: 15 U/L (ref 10–44)
ANION GAP SERPL CALC-SCNC: 8 MMOL/L (ref 8–16)
AST SERPL-CCNC: 17 U/L (ref 10–40)
BASOPHILS # BLD AUTO: 0.03 K/UL (ref 0–0.2)
BASOPHILS NFR BLD: 0.6 % (ref 0–1.9)
BILIRUB SERPL-MCNC: 0.5 MG/DL (ref 0.1–1)
BUN SERPL-MCNC: 19 MG/DL (ref 8–23)
CALCIUM SERPL-MCNC: 9.7 MG/DL (ref 8.7–10.5)
CHLORIDE SERPL-SCNC: 112 MMOL/L (ref 95–110)
CHOLEST SERPL-MCNC: 114 MG/DL (ref 120–199)
CHOLEST/HDLC SERPL: 3.2 {RATIO} (ref 2–5)
CO2 SERPL-SCNC: 19 MMOL/L (ref 23–29)
CREAT SERPL-MCNC: 1.2 MG/DL (ref 0.5–1.4)
DIFFERENTIAL METHOD BLD: ABNORMAL
EOSINOPHIL # BLD AUTO: 0.3 K/UL (ref 0–0.5)
EOSINOPHIL NFR BLD: 5.3 % (ref 0–8)
ERYTHROCYTE [DISTWIDTH] IN BLOOD BY AUTOMATED COUNT: 16.3 % (ref 11.5–14.5)
EST. GFR  (NO RACE VARIABLE): 47.8 ML/MIN/1.73 M^2
ESTIMATED AVG GLUCOSE: 194 MG/DL (ref 68–131)
GLUCOSE SERPL-MCNC: 144 MG/DL (ref 70–110)
HBA1C MFR BLD: 8.4 % (ref 4–5.6)
HCT VFR BLD AUTO: 37.4 % (ref 37–48.5)
HDLC SERPL-MCNC: 36 MG/DL (ref 40–75)
HDLC SERPL: 31.6 % (ref 20–50)
HGB BLD-MCNC: 11.4 G/DL (ref 12–16)
IMM GRANULOCYTES # BLD AUTO: 0.01 K/UL (ref 0–0.04)
IMM GRANULOCYTES NFR BLD AUTO: 0.2 % (ref 0–0.5)
LDLC SERPL CALC-MCNC: 63.2 MG/DL (ref 63–159)
LYMPHOCYTES # BLD AUTO: 1.4 K/UL (ref 1–4.8)
LYMPHOCYTES NFR BLD: 29.6 % (ref 18–48)
MCH RBC QN AUTO: 25.6 PG (ref 27–31)
MCHC RBC AUTO-ENTMCNC: 30.5 G/DL (ref 32–36)
MCV RBC AUTO: 84 FL (ref 82–98)
MONOCYTES # BLD AUTO: 0.5 K/UL (ref 0.3–1)
MONOCYTES NFR BLD: 10.9 % (ref 4–15)
NEUTROPHILS # BLD AUTO: 2.5 K/UL (ref 1.8–7.7)
NEUTROPHILS NFR BLD: 53.4 % (ref 38–73)
NONHDLC SERPL-MCNC: 78 MG/DL
NRBC BLD-RTO: 0 /100 WBC
PLATELET # BLD AUTO: 187 K/UL (ref 150–450)
PMV BLD AUTO: 12 FL (ref 9.2–12.9)
POTASSIUM SERPL-SCNC: 3.7 MMOL/L (ref 3.5–5.1)
PROT SERPL-MCNC: 7.1 G/DL (ref 6–8.4)
RBC # BLD AUTO: 4.46 M/UL (ref 4–5.4)
SODIUM SERPL-SCNC: 139 MMOL/L (ref 136–145)
TRIGL SERPL-MCNC: 74 MG/DL (ref 30–150)
WBC # BLD AUTO: 4.76 K/UL (ref 3.9–12.7)

## 2024-11-09 PROCEDURE — 80061 LIPID PANEL: CPT | Performed by: PHYSICIAN ASSISTANT

## 2024-11-09 PROCEDURE — 80053 COMPREHEN METABOLIC PANEL: CPT | Performed by: PHYSICIAN ASSISTANT

## 2024-11-09 PROCEDURE — 36415 COLL VENOUS BLD VENIPUNCTURE: CPT | Performed by: PHYSICIAN ASSISTANT

## 2024-11-09 PROCEDURE — 83036 HEMOGLOBIN GLYCOSYLATED A1C: CPT | Performed by: PHYSICIAN ASSISTANT

## 2024-11-09 PROCEDURE — 85025 COMPLETE CBC W/AUTO DIFF WBC: CPT | Performed by: PHYSICIAN ASSISTANT

## 2024-11-11 DIAGNOSIS — I15.2 HYPERTENSION ASSOCIATED WITH DIABETES: ICD-10-CM

## 2024-11-11 DIAGNOSIS — E11.59 HYPERTENSION ASSOCIATED WITH DIABETES: ICD-10-CM

## 2024-11-11 RX ORDER — LABETALOL 300 MG/1
300 TABLET, FILM COATED ORAL EVERY 12 HOURS
Qty: 60 TABLET | Refills: 5 | Status: SHIPPED | OUTPATIENT
Start: 2024-11-11

## 2024-11-13 ENCOUNTER — OFFICE VISIT (OUTPATIENT)
Dept: PODIATRY | Facility: CLINIC | Age: 74
End: 2024-11-13
Payer: MEDICARE

## 2024-11-13 ENCOUNTER — PATIENT MESSAGE (OUTPATIENT)
Dept: PODIATRY | Facility: CLINIC | Age: 74
End: 2024-11-13

## 2024-11-13 ENCOUNTER — APPOINTMENT (OUTPATIENT)
Dept: RADIOLOGY | Facility: HOSPITAL | Age: 74
End: 2024-11-13
Attending: INTERNAL MEDICINE
Payer: MEDICARE

## 2024-11-13 VITALS — HEIGHT: 64 IN | WEIGHT: 143.5 LBS | BODY MASS INDEX: 24.5 KG/M2

## 2024-11-13 DIAGNOSIS — Z78.0 MENOPAUSE: ICD-10-CM

## 2024-11-13 DIAGNOSIS — L60.0 ONYCHOCRYPTOSIS: ICD-10-CM

## 2024-11-13 DIAGNOSIS — E11.51 CONTROLLED TYPE 2 DM WITH PERIPHERAL CIRCULATORY DISORDER: Primary | ICD-10-CM

## 2024-11-13 DIAGNOSIS — I73.9 PAD (PERIPHERAL ARTERY DISEASE): ICD-10-CM

## 2024-11-13 DIAGNOSIS — B35.1 DERMATOPHYTOSIS OF NAIL: ICD-10-CM

## 2024-11-13 PROCEDURE — 99999 PR PBB SHADOW E&M-EST. PATIENT-LVL III: CPT | Mod: PBBFAC,,, | Performed by: PODIATRIST

## 2024-11-13 PROCEDURE — 77080 DXA BONE DENSITY AXIAL: CPT | Mod: TC

## 2024-11-13 PROCEDURE — 77080 DXA BONE DENSITY AXIAL: CPT | Mod: 26,,, | Performed by: RADIOLOGY

## 2024-11-13 NOTE — PROGRESS NOTES
[facility]  PODIATRIC MEDICINE AND SURGERY      CHIEF COMPLAINT  Chief Complaint   Patient presents with    Nail Care     Nail care, pt rates pain 7/10 in her knees, pt is diabetic, pt last seen pcp Mag Fraire PA-C 8/15/2024       HPI    SUBJECTIVE: Maryjane Mcintyre is a 74 y.o. female who  has a past medical history of Arthritis, Diabetes mellitus, type 2, GERD (gastroesophageal reflux disease), Gout, Hyperlipidemia, Hypertension, Onychomycosis, and PAD (peripheral artery disease). Janeycepresents to clinic for high risk diabetic foot exam and care.  Maryjane denies numbness, burning, and/or tingling sensations in their feet.  She complains about thickened, elongated, painful toenails.  She admits to history of tobacco abuse.     Relief is obtained with trimming nails.Patient has no other pedal complaints at this time      HgA1c:   Hemoglobin A1C   Date Value Ref Range Status   11/09/2024 8.4 (H) 4.0 - 5.6 % Final     Comment:     ADA Screening Guidelines:  5.7-6.4%  Consistent with prediabetes  >or=6.5%  Consistent with diabetes    High levels of fetal hemoglobin interfere with the HbA1C  assay. Heterozygous hemoglobin variants (HbS, HgC, etc)do  not significantly interfere with this assay.   However, presence of multiple variants may affect accuracy.     08/15/2024 9.1 (H) 4.0 - 5.6 % Final     Comment:     ADA Screening Guidelines:  5.7-6.4%  Consistent with prediabetes  >or=6.5%  Consistent with diabetes    High levels of fetal hemoglobin interfere with the HbA1C  assay. Heterozygous hemoglobin variants (HbS, HgC, etc)do  not significantly interfere with this assay.   However, presence of multiple variants may affect accuracy.     03/02/2024 9.9 (H) 4.0 - 5.6 % Final     Comment:     ADA Screening Guidelines:  5.7-6.4%  Consistent with prediabetes  >or=6.5%  Consistent with diabetes    High levels of fetal hemoglobin interfere with the HbA1C  assay. Heterozygous hemoglobin variants  "(HbS, HgC, etc)do  not significantly interfere with this assay.   However, presence of multiple variants may affect accuracy.         REVIEW OF SYSTEMS  General: Denies any fever or chills  Chest: Denies shortness of breath, wheezing, coughing, or sputum production  Heart: Denies chest pain.  As noted above and per history of current illness above, otherwise negative in the remainder of the 14 systems.     PHYSICAL EXAM  Vitals:    11/13/24 1430   Weight: 65.1 kg (143 lb 8.3 oz)   Height: 5' 4" (1.626 m)   PainSc:   7       GEN:  This patient is well-developed, well-nourished and appears stated age, well-oriented to person, place and time, and cooperative and pleasant on today's visit.      LOWER EXTREMITY PHYSICAL EXAMINATION   VASCULAR  DP pedal pulse 1/4 RIGHT, LEFT1/4   PT pedal pulse 2/4 RIGHT, LEFT2/4  Capillary refill time immediate to the toes.   Feet are warm to the touch. Skin temperature warm to warm from proximally to distally   There are no varicosities, telangiectasias noted to bilateral foot and ankle regions.   There are no ecchymoses noted to bilateral foot and ankle regions.   There is no gross lower extremity edema.    DERMATOLOGIC  Skin moist with healthy texture and turgor.  Thickened, dystrophic, elongated toenails with subungal debris 1-5 b/l   Incurvated medial border LEFT hallux, no acute SOI  There are no open ulcerations, lacerations, or fissures to bilateral foot and ankle regions. There are no signs of infection as there is no erythema, no proximal-extending lymphangiitis, no fluctuance, or crepitus noted on palpation to bilateral foot and ankle regions.   There is no interdigital maceration.     NEUROLOGIC  Protective sensation intact at 10/10 sites upon examination with [place] Weinsten 5.07 g monofilament.  Propioception intact at 1st MTPJ b/l.  Achilles and patellar deep tendon reflexes intact  Babinski reflex absent    ORTHOPEDIC/BIOMECHANICAL  No symptomatic structural abnormalities " noted  Muscle strength AT/EHL/EDL/PT: 5/5; Achilles/Gastroc/Soleus: 5/5; PB/PL: 5/5 Muscle tone is normal.  Ankle joint ROM limited DF/PF, non-crepitus  STJ ROM  inv/ev, non crepitus       ASSESSMENT  Encounter Diagnoses   Name Primary?    Controlled type 2 DM with peripheral circulatory disorder Yes    PAD (peripheral artery disease)     Dermatophytosis of nail     Onychocryptosis            Plan:  -Discuss presenting problems, etiology, pathologic processes and management options with patient today.   -I counseled the patient on their conditions, their implications and medical management. An in depth discussion on diabetic management, risk prevention, amputation prevention verbally and provided educational literature in written format.    -With patient's permission, the elongated onychomycotic toenails, as outlined in the physical examination, were sharply debrided with a double action nail nipper to their soft tissue attachment. If indicated, the nails were then smoothed down in thickness with a mechanical rotary sherron and/or eros board to facilitate in further debridement removing all offending nail and subungual debris.    Utilizing sterile toenail clippers I aggressively trimmed the offending nail border approximately 3 mm from its edge and carried the nail plate incision down at an angle in order to wedge out the offending cryptotic portion of the nail plate. The offending border was then removed in toto. The remaining nail was grinded down with an electric  down to nail bed. Minimal blood was drawn. Applied betadine and covered with band-aid. Patient tolerated the procedure well and related significant relief.      - Shoe inspection. Diabetic Foot Education. Patient reminded of the importance of good nutrition and blood sugar control to help prevent podiatric complications of diabetes. Patient instructed on proper foot hygeine. We discussed wearing proper shoe gear, daily foot inspections, never  walking without protective shoe gear, never putting sharp instruments to feet, routine podiatric visits annually/semi annually or sooner if symptoms arise    Disclaimer: This note was partially prepared using a voice recognition system and is likely to have sound alike errors within the text.        Future Appointments   Date Time Provider Department Center   12/24/2024 10:00 AM Zarina He FNP [facility] DIABETE  Medical C   2/18/2025  9:40 AM Angelita Dorado DO [facility] IM  Medical C   3/13/2025  2:15 PM Allie Cartagena DPM [facility] POD [place]   6/17/2025  9:00 AM Candelario Black MD [facility] CARDIO [place]       Report Electronically Signed By:     Allie Cartagena DPM   Podiatry  [facility]-   11/22/2024

## 2024-11-15 ENCOUNTER — OFFICE VISIT (OUTPATIENT)
Dept: INTERNAL MEDICINE | Facility: CLINIC | Age: 74
End: 2024-11-15
Payer: MEDICARE

## 2024-11-15 ENCOUNTER — LAB VISIT (OUTPATIENT)
Dept: LAB | Facility: HOSPITAL | Age: 74
End: 2024-11-15
Attending: INTERNAL MEDICINE
Payer: MEDICARE

## 2024-11-15 VITALS
DIASTOLIC BLOOD PRESSURE: 66 MMHG | HEART RATE: 75 BPM | TEMPERATURE: 98 F | BODY MASS INDEX: 25.73 KG/M2 | WEIGHT: 149.94 LBS | SYSTOLIC BLOOD PRESSURE: 112 MMHG | OXYGEN SATURATION: 98 % | RESPIRATION RATE: 20 BRPM

## 2024-11-15 DIAGNOSIS — Z79.4 UNCONTROLLED TYPE 2 DIABETES MELLITUS WITH HYPERGLYCEMIA, WITH LONG-TERM CURRENT USE OF INSULIN: ICD-10-CM

## 2024-11-15 DIAGNOSIS — I10 HYPERTENSION GOAL BP (BLOOD PRESSURE) < 130/80: Primary | ICD-10-CM

## 2024-11-15 DIAGNOSIS — E11.65 UNCONTROLLED TYPE 2 DIABETES MELLITUS WITH HYPERGLYCEMIA, WITH LONG-TERM CURRENT USE OF INSULIN: ICD-10-CM

## 2024-11-15 DIAGNOSIS — Z23 NEED FOR VACCINATION: ICD-10-CM

## 2024-11-15 DIAGNOSIS — I73.9 PAD (PERIPHERAL ARTERY DISEASE): ICD-10-CM

## 2024-11-15 DIAGNOSIS — I65.23 ASYMPTOMATIC BILATERAL CAROTID ARTERY STENOSIS: ICD-10-CM

## 2024-11-15 DIAGNOSIS — E78.5 HYPERLIPIDEMIA LDL GOAL <70: ICD-10-CM

## 2024-11-15 DIAGNOSIS — R82.90 ABNORMAL URINALYSIS: ICD-10-CM

## 2024-11-15 LAB
ALBUMIN/CREAT UR: NORMAL UG/MG (ref 0–30)
CREAT UR-MCNC: 58 MG/DL (ref 15–325)
MICROALBUMIN UR DL<=1MG/L-MCNC: <5 UG/ML

## 2024-11-15 PROCEDURE — 87088 URINE BACTERIA CULTURE: CPT | Performed by: INTERNAL MEDICINE

## 2024-11-15 PROCEDURE — 87186 SC STD MICRODIL/AGAR DIL: CPT | Performed by: INTERNAL MEDICINE

## 2024-11-15 PROCEDURE — 99999 PR PBB SHADOW E&M-EST. PATIENT-LVL III: CPT | Mod: PBBFAC,,, | Performed by: PHYSICIAN ASSISTANT

## 2024-11-15 PROCEDURE — 82043 UR ALBUMIN QUANTITATIVE: CPT | Performed by: INTERNAL MEDICINE

## 2024-11-15 PROCEDURE — 87086 URINE CULTURE/COLONY COUNT: CPT | Performed by: INTERNAL MEDICINE

## 2024-11-15 RX ORDER — INSULIN PUMP SYRINGE, 3 ML
EACH MISCELLANEOUS
Qty: 1 EACH | Refills: 0 | Status: SHIPPED | OUTPATIENT
Start: 2024-11-15 | End: 2025-11-15

## 2024-11-15 RX ORDER — LOSARTAN POTASSIUM 50 MG/1
50 TABLET ORAL DAILY
Qty: 90 TABLET | Refills: 3 | Status: SHIPPED | OUTPATIENT
Start: 2024-11-15

## 2024-11-15 RX ORDER — AMLODIPINE BESYLATE 10 MG/1
10 TABLET ORAL DAILY
Qty: 90 TABLET | Refills: 3 | Status: SHIPPED | OUTPATIENT
Start: 2024-11-15

## 2024-11-15 RX ORDER — LANCETS
EACH MISCELLANEOUS
Qty: 200 EACH | Refills: 1 | Status: SHIPPED | OUTPATIENT
Start: 2024-11-15

## 2024-11-15 RX ORDER — ROSUVASTATIN CALCIUM 40 MG/1
40 TABLET, COATED ORAL NIGHTLY
Qty: 90 TABLET | Refills: 3 | Status: SHIPPED | OUTPATIENT
Start: 2024-11-15

## 2024-11-15 RX ORDER — CELECOXIB 100 MG/1
100 CAPSULE ORAL 2 TIMES DAILY
COMMUNITY
Start: 2024-10-24 | End: 2024-11-15

## 2024-11-15 RX ORDER — INSULIN GLARGINE 100 [IU]/ML
16 INJECTION, SOLUTION SUBCUTANEOUS NIGHTLY
Qty: 14.4 ML | Refills: 1 | Status: SHIPPED | OUTPATIENT
Start: 2024-11-15

## 2024-11-15 RX ORDER — LINAGLIPTIN 5 MG/1
5 TABLET, FILM COATED ORAL DAILY
Qty: 90 TABLET | Refills: 3 | Status: SHIPPED | OUTPATIENT
Start: 2024-11-15

## 2024-11-15 NOTE — PROGRESS NOTES
Subjective:      Patient ID: Maryjane Mcintyre is a 74 y.o. female.    Chief Complaint: Follow-up (She is here for a 3 month follow up. Would like to discuss a letter about diabetic testing supplies. )    Patient is known to me, being seen today for follow up.   Denies current concerns/complaints      HTN- labetalol 300mg bid, losartan 50mg, amlodipine 10mg, HCTZ 12.5mg   HLD- on statin therapy   DM- A1c 8.4%, tradjenta 5mg, jardiance 25mg, lantus 12 units   Scheduled w diabetes next month    Labs recently completed, diabetes remains uncontrolled, otherwise stable     Present with daughter  Requests testing supplies      Last visit Aug 2024 w myself.       Review of Systems   Constitutional:  Negative for chills, diaphoresis and fever.   HENT:  Negative for congestion, rhinorrhea and sore throat.    Respiratory:  Negative for cough, shortness of breath and wheezing.    Cardiovascular:  Negative for chest pain and leg swelling.   Gastrointestinal:  Negative for abdominal pain, constipation, diarrhea, nausea and vomiting.   Skin:  Negative for rash.   Neurological:  Negative for dizziness, light-headedness and headaches.       Objective:   /66 (BP Location: Left arm, Patient Position: Sitting)   Pulse 75   Temp 97.6 °F (36.4 °C) (Tympanic)   Resp 20   Wt 68 kg (149 lb 14.6 oz)   SpO2 98%   BMI 25.73 kg/m²   Physical Exam  Constitutional:       General: She is not in acute distress.     Appearance: Normal appearance. She is well-developed. She is not ill-appearing.   HENT:      Head: Normocephalic and atraumatic.   Cardiovascular:      Rate and Rhythm: Normal rate and regular rhythm.      Heart sounds: Normal heart sounds. No murmur heard.  Pulmonary:      Effort: Pulmonary effort is normal. No respiratory distress.      Breath sounds: Normal breath sounds. No decreased breath sounds.   Musculoskeletal:      Right lower leg: No edema.      Left lower leg: No edema.   Skin:     General: Skin is warm and dry.       Findings: No rash.   Psychiatric:         Speech: Speech normal.         Behavior: Behavior normal.         Thought Content: Thought content normal.       Assessment:      1. Hypertension goal BP (blood pressure) < 130/80    2. Uncontrolled type 2 diabetes mellitus with hyperglycemia, with long-term current use of insulin    3. Hyperlipidemia LDL goal <70    4. Asymptomatic bilateral carotid artery stenosis    5. PAD (peripheral artery disease)       Plan:   Hypertension goal BP (blood pressure) < 130/80  -     losartan (COZAAR) 50 MG tablet; Take 1 tablet (50 mg total) by mouth once daily.  Dispense: 90 tablet; Refill: 3  -     amLODIPine (NORVASC) 10 MG tablet; Take 1 tablet (10 mg total) by mouth once daily.  Dispense: 90 tablet; Refill: 3    Uncontrolled type 2 diabetes mellitus with hyperglycemia, with long-term current use of insulin  -     insulin glargine U-100, Lantus, (LANTUS SOLOSTAR U-100 INSULIN) 100 unit/mL (3 mL) InPn pen; Inject 16 Units into the skin every evening.  Dispense: 14.4 mL; Refill: 1  -     blood-glucose meter kit; To check BG two times daily, to use with insurance preferred meter  Dispense: 1 each; Refill: 0  -     lancets Misc; To check BG two times daily, to use with insurance preferred meter  Dispense: 200 each; Refill: 1  -     blood sugar diagnostic Strp; To check BG two times daily, to use with insurance preferred meter  Dispense: 200 each; Refill: 1  -     linaGLIPtin (TRADJENTA) 5 mg Tab tablet; Take 1 tablet (5 mg total) by mouth once daily.  Dispense: 90 tablet; Refill: 3  -     empagliflozin (JARDIANCE) 25 mg tablet; Take 1 tablet (25 mg total) by mouth once daily.  Dispense: 90 tablet; Refill: 3    Hyperlipidemia LDL goal <70  -     rosuvastatin (CRESTOR) 40 MG Tab; Take 1 tablet (40 mg total) by mouth every evening.  Dispense: 90 tablet; Refill: 3    Asymptomatic bilateral carotid artery stenosis    PAD (peripheral artery disease)      Increase insulin to 16 units, continue  other medications   Keep follow up with Diabetes     Schedule eye exam     3mth f/u PCP

## 2024-11-17 LAB — BACTERIA UR CULT: ABNORMAL

## 2024-11-19 ENCOUNTER — TELEPHONE (OUTPATIENT)
Dept: INTERNAL MEDICINE | Facility: CLINIC | Age: 74
End: 2024-11-19
Payer: MEDICARE

## 2024-11-19 RX ORDER — NITROFURANTOIN 25; 75 MG/1; MG/1
100 CAPSULE ORAL 2 TIMES DAILY
Qty: 20 CAPSULE | Refills: 0 | Status: SHIPPED | OUTPATIENT
Start: 2024-11-19 | End: 2024-11-29

## 2024-12-20 ENCOUNTER — TELEPHONE (OUTPATIENT)
Dept: DIABETES | Facility: CLINIC | Age: 74
End: 2024-12-20
Payer: MEDICARE

## 2024-12-20 NOTE — TELEPHONE ENCOUNTER
"I called  to confirm her "New Patient" appointment on 12/24/24 @10am with Zarina He NP. 's daughter answered and stated that  would not make it to her appointment due to her being out of town and her other daughter will be working. I asked if she would like to reschedule her visit now or would she like to call back and get it rescheduled she stated she would call back and get her mother  scheduled.   "

## 2025-02-14 ENCOUNTER — TELEPHONE (OUTPATIENT)
Dept: DIABETES | Facility: CLINIC | Age: 75
End: 2025-02-14
Payer: MEDICARE

## 2025-02-14 NOTE — TELEPHONE ENCOUNTER
Called patient to assist with scheduling a new patient appointment with diabetes management no answer left a message

## 2025-03-17 DIAGNOSIS — N32.81 OAB (OVERACTIVE BLADDER): ICD-10-CM

## 2025-03-17 RX ORDER — OXYBUTYNIN CHLORIDE 5 MG/1
5 TABLET, EXTENDED RELEASE ORAL
Qty: 90 TABLET | Refills: 0 | Status: SHIPPED | OUTPATIENT
Start: 2025-03-17

## 2025-03-17 NOTE — TELEPHONE ENCOUNTER
Care Due:                  Date            Visit Type   Department     Provider  --------------------------------------------------------------------------------                                [facility] [place]     ON INTERNAL  Last Visit: 03-      FOLLOW UP    MEDICINE       Angelita Dorado  Next Visit: None Scheduled  None         None Found                                                            Last  Test          Frequency    Reason                     Performed    Due Date  --------------------------------------------------------------------------------    Office Visit  15 months..  omeprazole, oxybutynin...  03- 06-    [facility] Embedded Care Due Messages. Reference number: [id number].   3/17/2025 6:05:06 AM ALEXANDRA

## 2025-03-17 NOTE — TELEPHONE ENCOUNTER
Provider Staff:  Action required for this patient    Requires appointment      Please see care gap opportunities below in Care Due Message.    Thanks!  [facility]     Appointments      Date Provider   Last Visit   9/18/2024 Angelita Dorado DO   Next Visit   Visit date not found Angelita Dorado DO     Refill Decision Note   Maryjane Mcintyre  is requesting a refill authorization.  Brief Assessment and Rationale for Refill:  Approve     Medication Therapy Plan:        Comments:     Note composed:6:42 PM 03/17/2025

## 2025-03-25 ENCOUNTER — OFFICE VISIT (OUTPATIENT)
Dept: PODIATRY | Facility: CLINIC | Age: 75
End: 2025-03-25
Payer: MEDICARE

## 2025-03-25 DIAGNOSIS — L60.3 ONYCHODYSTROPHY: ICD-10-CM

## 2025-03-25 DIAGNOSIS — E11.51 TYPE 2 DIABETES MELLITUS WITH PERIPHERAL CIRCULATORY DISORDER: ICD-10-CM

## 2025-03-25 DIAGNOSIS — E11.65 POORLY CONTROLLED TYPE 2 DIABETES MELLITUS: Primary | ICD-10-CM

## 2025-03-25 LAB
LEFT EYE DM RETINOPATHY: NEGATIVE
RIGHT EYE DM RETINOPATHY: NEGATIVE

## 2025-03-25 PROCEDURE — 99999 PR PBB SHADOW E&M-EST. PATIENT-LVL III: CPT | Mod: PBBFAC,,, | Performed by: PODIATRIST

## 2025-03-25 NOTE — PROGRESS NOTES
Subjective:       Patient ID: Maryjane Mcintyre is a 74 y.o. female.    Chief Complaint: Nail Care (Nail care: c/o denies pain, pt is diabetic, pt was last seen on 11.15.24 [place])      HPI: Patient presents to the office today with the chief complaint of elongated, thickened and dystrophic nail plates to the B/L foot. This patient is a Diabetic Type II, complicated with circulatory complications. Patient does follow with Primary Care and/or Endocrinology for management of Diabetes Mellitus. This patient's PMD is Angelita Dorado DO. This patient last saw his/her primary care provider on 11/15/2024.     Hemoglobin A1C   Date Value Ref Range Status   11/09/2024 8.4 (H) 4.0 - 5.6 % Final     Comment:     ADA Screening Guidelines:  5.7-6.4%  Consistent with prediabetes  >or=6.5%  Consistent with diabetes    High levels of fetal hemoglobin interfere with the HbA1C  assay. Heterozygous hemoglobin variants (HbS, HgC, etc)do  not significantly interfere with this assay.   However, presence of multiple variants may affect accuracy.     08/15/2024 9.1 (H) 4.0 - 5.6 % Final     Comment:     ADA Screening Guidelines:  5.7-6.4%  Consistent with prediabetes  >or=6.5%  Consistent with diabetes    High levels of fetal hemoglobin interfere with the HbA1C  assay. Heterozygous hemoglobin variants (HbS, HgC, etc)do  not significantly interfere with this assay.   However, presence of multiple variants may affect accuracy.     03/02/2024 9.9 (H) 4.0 - 5.6 % Final     Comment:     ADA Screening Guidelines:  5.7-6.4%  Consistent with prediabetes  >or=6.5%  Consistent with diabetes    High levels of fetal hemoglobin interfere with the HbA1C  assay. Heterozygous hemoglobin variants (HbS, HgC, etc)do  not significantly interfere with this assay.   However, presence of multiple variants may affect accuracy.     .     Review of patient's allergies indicates:  No Known Allergies    Past Medical History:   Diagnosis Date    Arthritis      Diabetes mellitus, type 2     GERD (gastroesophageal reflux disease)     Gout     Hyperlipidemia     Hypertension     Onychomycosis     PAD (peripheral artery disease)        Family History   Problem Relation Name Age of Onset    Diabetes Father      Hypertension Father      Heart attack Sister      Colon cancer Sister      Stroke Sister      Colon cancer Brother         Social History[1]    Past Surgical History:   Procedure Laterality Date    Cardiac Echo      COLONOSCOPY N/A 10/19/2020    Procedure: COLONOSCOPY;  Surgeon: Yousuf Fuller MD;  Location: [facility] ENDO;  Service: Endoscopy;  Laterality: N/A;    ESOPHAGOGASTRODUODENOSCOPY N/A 10/19/2020    Procedure: ESOPHAGOGASTRODUODENOSCOPY (EGD);  Surgeon: Yousuf Fuller MD;  Location: [facility] ENDO;  Service: Endoscopy;  Laterality: N/A;    GALLBLADDER SURGERY      HYSTERECTOMY         Review of Systems       Objective:   There were no vitals taken for this visit.    Physical Exam  LOWER EXTREMITY PHYSICAL EXAMINATION    VASCULAR:  The right dorsalis pedis pulse 2/4 and the right posterior tibial pulse 1/4.  The left dorsalis pedis pulse 2/4 and posterior tibial pulse on the left is 1/4.  Capillary refill is intact.  Pedal hair growth decreased.     NEUROLOGY:  Protective sensation is intact with [place] monofilament. Proprioception is intact. Intact sensation to light touch.  Vibratory sensation is diminished to the 1st metatarsal phalangeal joint.     DERMATOLOGY:  Skin is supple, moist, intact.  There is no signs of callusing, ulcerations, other lesions identified to the dorsal or plantar aspect of the right or left foot.  The R1, 2, 5 and left L1,2, 5 are thickened, discolored dystrophic.  There is subungual debris.  Nail plates have area of dark discoloration.  The remaining nails on the right foot and the left foot are elongated but of normal color, thickness, and texture.   There is no signs of ingrowing into the medial or lateral borders.   There is no evidence of wounds or skin breakdown.  No edema or erythema.  No obvious lacerations or fissuring.  Interdigital spaces are clean, dry, intact.  No rashes or scars appreciated.    ORTHOPEDIC: Manual Muscle Testing is 5/5 in all planes on the left and right, without pains, with and without resistance. Gait pattern is non-antalgic.    Assessment:     1. Poorly controlled type 2 diabetes mellitus    2. Type 2 diabetes mellitus with peripheral circulatory disorder    3. Onychodystrophy        Plan:     Poorly controlled type 2 diabetes mellitus    Type 2 diabetes mellitus with peripheral circulatory disorder    Onychodystrophy        Thorough discussion is had with the patient this afternoon, concerning the diagnosis, its etiology, and the treatment algorithm at present.  Greater than 50% of this visit spent on counseling and coordination of care. Greater than 15 minutes of a 20 minute appointment spent on education about the diabetic foot, neuropathy, and prevention of limb loss.  Shoe inspection. Diabetic Foot Education. Patient reminded of the importance of good nutrition and blood sugar control to help prevent podiatric complications of diabetes. Patient instructed on proper foot hygeine. We discussed wearing proper and supportive shoe gear, daily foot inspections, never walking barefooted or sock footed, never putting sharp instruments to feet which can cause major complications associated with infection, ulcers, lacerations.      Dystrophic nail plates, as outlined above (R#1,2,5  ; L#1,2,5 ), are sharply debrided with double action nail nipper, and/or with the assistance of a mechanical rotary sherron, with removal of all offending nail and nail border(s), for reduction of pains. Nails are reduced in terms of length, width and girth with removal of subungual debris to facilitate pain free weight bearing and ambulation. The elongated nails as outlined in the objective portion of this note, were trimmed to  appropriate length, with a double action nail nipper, for alleviation/reduction of pains as well. Follow up in approx. 3-4 months.        Future Appointments   Date Time Provider Department Center   7/3/2025  1:20 PM Candelario Black MD [facility] [place]           [1]   Social History  Socioeconomic History    Marital status:    Tobacco Use    Smoking status: Former     Current packs/day: 1.00     Types: Cigarettes    Smokeless tobacco: Never    Tobacco comments:     Quit 2019 mid year   Substance and Sexual Activity    Alcohol use: No    Drug use: Never    Sexual activity: Not Currently     Social Drivers of Health     Financial Resource Strain: Low Risk  (3/4/2024)    Overall Financial Resource Strain (CARDIA)     Difficulty of Paying Living Expenses: Not very hard   Food Insecurity: No Food Insecurity (3/4/2024)    Hunger Vital Sign     Worried About Running Out of Food in the Last Year: Never true     Ran Out of Food in the Last Year: Never true   Transportation Needs: Unknown (3/4/2024)    PRAPARE - Transportation     Lack of Transportation (Non-Medical): No   Stress: No Stress Concern Present (3/4/2024)    [place] [employer] - Occupational Stress Questionnaire     Feeling of Stress : Not at all   Housing Stability: Low Risk  (3/4/2024)    Housing Stability Vital Sign     Unable to Pay for Housing in the Last Year: No     Number of Places Lived in the Last Year: 1     Unstable Housing in the Last Year: No

## 2025-03-27 ENCOUNTER — PATIENT OUTREACH (OUTPATIENT)
Dept: ADMINISTRATIVE | Facility: HOSPITAL | Age: 75
End: 2025-03-27
Payer: MEDICARE

## 2025-04-07 DIAGNOSIS — N32.81 OAB (OVERACTIVE BLADDER): ICD-10-CM

## 2025-04-08 RX ORDER — OXYBUTYNIN CHLORIDE 5 MG/1
5 TABLET, EXTENDED RELEASE ORAL
Qty: 90 TABLET | Refills: 0 | Status: SHIPPED | OUTPATIENT
Start: 2025-04-08

## 2025-04-08 NOTE — TELEPHONE ENCOUNTER
No care due was identified.  [facility] Embedded Care Due Messages. Reference number: [id number].   4/07/2025 9:20:25 PM CDT

## 2025-04-08 NOTE — TELEPHONE ENCOUNTER
Refill Decision Note   Maryjane Mcintyre  is requesting a refill authorization.  Brief Assessment and Rationale for Refill:  Approve     Medication Therapy Plan:  Due for OV. Meets criteria for 1 90-day refill      Comments:     Note composed:10:46 AM 04/08/2025

## 2025-04-11 ENCOUNTER — PATIENT OUTREACH (OUTPATIENT)
Dept: ADMINISTRATIVE | Facility: HOSPITAL | Age: 75
End: 2025-04-11
Payer: MEDICARE

## 2025-04-11 NOTE — PROGRESS NOTES
VB Score: 1     Hemoglobin A1c    Tetanus Vaccine  Shingles/Zoster Vaccine  RSV Vaccine            Pt missed follow up appt and labs.  Attempted to contact pt to schedule follow up and labs. Spoke with daughter and requested pt call to schedule appt.

## 2025-04-21 ENCOUNTER — TELEPHONE (OUTPATIENT)
Dept: ORTHOPEDICS | Facility: CLINIC | Age: 75
End: 2025-04-21
Payer: MEDICARE

## 2025-04-21 NOTE — TELEPHONE ENCOUNTER
Spoke with patients' daughter in regards to scheduling, successfully was able to get patient scheduled. Family was appreciative for the call.     ----- Message from Mere sent at 4/21/2025 10:05 AM CDT -----  Contact: patel/ daughter  Type:  RX Refill RequestWho Called: PatelRefill or New Rx:refillRX Name and Strength:celebrixHow is the patient currently taking it? (ex. 1XDay):Is this a 30 day or 90 day RX:Preferred Pharmacy with phone number:[phone] - [place] [address] [address]Moztq: [phone] Fax: [phone] or Mail Order:localOrdering Provider:massiel Zacarias the patient rather a call back or a response via [facility]? Call back Best Call Back Number:[phone] Information:

## 2025-04-23 DIAGNOSIS — G89.29 CHRONIC PAIN OF BOTH KNEES: Primary | ICD-10-CM

## 2025-04-23 DIAGNOSIS — M25.562 CHRONIC PAIN OF BOTH KNEES: Primary | ICD-10-CM

## 2025-04-23 DIAGNOSIS — M25.561 CHRONIC PAIN OF BOTH KNEES: Primary | ICD-10-CM

## 2025-05-05 ENCOUNTER — OFFICE VISIT (OUTPATIENT)
Dept: ORTHOPEDICS | Facility: CLINIC | Age: 75
End: 2025-05-05
Payer: MEDICARE

## 2025-05-05 ENCOUNTER — HOSPITAL ENCOUNTER (OUTPATIENT)
Dept: RADIOLOGY | Facility: HOSPITAL | Age: 75
Discharge: HOME | End: 2025-05-05
Attending: PHYSICIAN ASSISTANT
Payer: MEDICARE

## 2025-05-05 VITALS — BODY MASS INDEX: 25.6 KG/M2 | WEIGHT: 149.94 LBS | HEIGHT: 64 IN

## 2025-05-05 DIAGNOSIS — M17.0 PRIMARY OSTEOARTHRITIS OF BOTH KNEES: Primary | ICD-10-CM

## 2025-05-05 DIAGNOSIS — G89.29 CHRONIC PAIN OF BOTH KNEES: ICD-10-CM

## 2025-05-05 DIAGNOSIS — M21.161 ACQUIRED VARUS DEFORMITY KNEE, RIGHT: ICD-10-CM

## 2025-05-05 DIAGNOSIS — M25.562 CHRONIC PAIN OF BOTH KNEES: ICD-10-CM

## 2025-05-05 DIAGNOSIS — M21.162 ACQUIRED VARUS DEFORMITY KNEE, LEFT: ICD-10-CM

## 2025-05-05 DIAGNOSIS — M25.561 CHRONIC PAIN OF BOTH KNEES: ICD-10-CM

## 2025-05-05 PROCEDURE — 73564 X-RAY EXAM KNEE 4 OR MORE: CPT | Mod: TC,50

## 2025-05-05 PROCEDURE — 1101F PT FALLS ASSESS-DOCD LE1/YR: CPT | Mod: CPTII,S$GLB,, | Performed by: PHYSICIAN ASSISTANT

## 2025-05-05 PROCEDURE — 1159F MED LIST DOCD IN RCRD: CPT | Mod: CPTII,S$GLB,, | Performed by: PHYSICIAN ASSISTANT

## 2025-05-05 PROCEDURE — 3008F BODY MASS INDEX DOCD: CPT | Mod: CPTII,S$GLB,, | Performed by: PHYSICIAN ASSISTANT

## 2025-05-05 PROCEDURE — 4010F ACE/ARB THERAPY RXD/TAKEN: CPT | Mod: CPTII,S$GLB,, | Performed by: PHYSICIAN ASSISTANT

## 2025-05-05 PROCEDURE — 1125F AMNT PAIN NOTED PAIN PRSNT: CPT | Mod: CPTII,S$GLB,, | Performed by: PHYSICIAN ASSISTANT

## 2025-05-05 PROCEDURE — 3288F FALL RISK ASSESSMENT DOCD: CPT | Mod: CPTII,S$GLB,, | Performed by: PHYSICIAN ASSISTANT

## 2025-05-05 PROCEDURE — 99999 PR PBB SHADOW E&M-EST. PATIENT-LVL III: CPT | Mod: PBBFAC,,, | Performed by: PHYSICIAN ASSISTANT

## 2025-05-05 PROCEDURE — 99204 OFFICE O/P NEW MOD 45 MIN: CPT | Mod: S$GLB,,, | Performed by: PHYSICIAN ASSISTANT

## 2025-05-05 PROCEDURE — 73564 X-RAY EXAM KNEE 4 OR MORE: CPT | Mod: 26,50,, | Performed by: STUDENT IN AN ORGANIZED HEALTH CARE EDUCATION/TRAINING PROGRAM

## 2025-05-05 PROCEDURE — 1160F RVW MEDS BY RX/DR IN RCRD: CPT | Mod: CPTII,S$GLB,, | Performed by: PHYSICIAN ASSISTANT

## 2025-05-05 RX ORDER — CELECOXIB 200 MG/1
200 CAPSULE ORAL 2 TIMES DAILY
Qty: 60 CAPSULE | Refills: 2 | Status: SHIPPED | OUTPATIENT
Start: 2025-05-05

## 2025-05-05 NOTE — PROGRESS NOTES
Patient ID: Maryjane Mcintyre is a 74 y.o. female.    Chief Complaint: Pain of the Left Knee and Pain of the Right Knee      HPI: Maryjane Mcintyre  is a 74 y.o. female who c/o Pain of the Left Knee and Pain of the Right Knee       Patient presents as a new patient to me today with chief complaint of bilateral knee pain, right greater than left.  Patient notes pain is at worst a 6/10 affecting activity of daily living and thus her quality of life.  She presents today in a wheelchair precaution as she has a hard time walking long distances, going from a sitting to standing or standing to seated position, unlevel terrain or stairs.  She is accompanied by family who attest that she uses a walker at times to get around but she is requiring more and more assistance in activity of daily living.  She was being seen by outside provider treated with 2 rounds of physical therapy, short-acting steroid injections as well as gel injections.  They deny any falls or trauma.  Patient states it is very stiff in the morning she denies any catching or locking.  She is not wearing any knee braces but does feel some instability especially to the right at times.  She states the injection she received in the past did provide some relief although it has been quite some time since she has received anything  Additionally the family states she was taking Celebrex twice a day and they inquire if this medication can be restarted for her  They are looking to hold off on surgical intervention and proceed with non operative treatments      Patient is presently denying any shortness of breath, chest pain, fever/chills, nausea/vomiting, loss of taste or smell, numbness/tingling or sensation changes, loss of bladder or bowel function.    Past Medical History:   Diagnosis Date    Arthritis     Diabetes mellitus, type 2     GERD (gastroesophageal reflux disease)     Gout     Hyperlipidemia     Hypertension     Onychomycosis     PAD (peripheral artery disease)         Past Surgical History:   Procedure Laterality Date    Cardiac Echo      COLONOSCOPY N/A 10/19/2020    Procedure: COLONOSCOPY;  Surgeon: Yousuf Fuller MD;  Location: [facility] ENDO;  Service: Endoscopy;  Laterality: N/A;    ESOPHAGOGASTRODUODENOSCOPY N/A 10/19/2020    Procedure: ESOPHAGOGASTRODUODENOSCOPY (EGD);  Surgeon: Yousuf Fuller MD;  Location: [facility] ENDO;  Service: Endoscopy;  Laterality: N/A;    GALLBLADDER SURGERY      HYSTERECTOMY         Family History   Problem Relation Name Age of Onset    Diabetes Father      Hypertension Father      Heart attack Sister      Colon cancer Sister      Stroke Sister      Colon cancer Brother         Social History[1]    Medication List with Changes/Refills   New Medications    CELECOXIB (CELEBREX) 200 MG CAPSULE    Take 1 capsule (200 mg total) by mouth 2 (two) times daily. Take with food   Current Medications    ACETAMINOPHEN (TYLENOL) 160 MG/5 ML LIQD    Take 100 mg by mouth daily as needed. Take one tablet by mouth once daily as needed    AMLODIPINE (NORVASC) 10 MG TABLET    Take 1 tablet (10 mg total) by mouth once daily.    BLOOD SUGAR DIAGNOSTIC STRP    To check BG two times daily, to use with insurance preferred meter    BLOOD-GLUCOSE METER KIT    To check BG two times daily, to use with insurance preferred meter    CHOLECALCIFEROL, VITAMIN D3, 5,000 UNIT CAPSULE    Take 5,000 Units by mouth once daily.    CILOSTAZOL (PLETAL) 50 MG TAB    Take 1 tablet (50 mg total) by mouth 2 (two) times daily.    EMPAGLIFLOZIN (JARDIANCE) 25 MG TABLET    Take 1 tablet (25 mg total) by mouth once daily.    INSULIN GLARGINE U-100, LANTUS, (LANTUS SOLOSTAR U-100 INSULIN) 100 UNIT/ML (3 ML) INPN PEN    Inject 16 Units into the skin every evening.    IPRATROPIUM-ALBUTEROL (COMBIVENT RESPIMAT)  MCG/ACTUATION INHALER    Inhale 1 puff into the lungs every 6 (six) hours as needed for Wheezing. Rescue    LABETALOL (NORMODYNE) 300 MG TABLET    TAKE 1 TABLET BY MOUTH EVERY  "12 HOURS    LANCETS MISC    To check BG two times daily, to use with insurance preferred meter    LINAGLIPTIN (TRADJENTA) 5 MG TAB TABLET    Take 1 tablet (5 mg total) by mouth once daily.    LOSARTAN (COZAAR) 50 MG TABLET    Take 1 tablet (50 mg total) by mouth once daily.    OMEPRAZOLE (PRILOSEC) 20 MG CAPSULE    Take 1 capsule (20 mg total) by mouth once daily.    OXYBUTYNIN (DITROPAN-XL) 5 MG TR24    Take 1 tablet by mouth once daily    PEN NEEDLE, DIABETIC (BD MAGDALENA 2ND GEN PEN NEEDLE) 32 GAUGE X 5/32" NDLE    USE TWICE DAILY    ROSUVASTATIN (CRESTOR) 40 MG TAB    Take 1 tablet (40 mg total) by mouth every evening.       Review of patient's allergies indicates:  No Known Allergies      Objective:     Right Lower Extremity  KNEE:  ROM: passive flex/ ext 5-110 degrees  Patella midling, moderate crepitus noted   Ligaments stable; slight laxity  Pain on palpation to medial more so than lateral aspect as well as anterior patella   No defect in the patellar or quadriceps tendon  Calf NT, soft  (-) Humble sign  DF/PF full  Wiggles toes  Sensation intact to light touch   No pitting edema appreciated   NVI  Cap refill < 2 sec    Skin warm to touch, no obvious lesion noted     Left KNEE:  ROM: passive flex/ ext full  Patella midling, moderate crepitus noted   Ligaments stable  Pain on palpation to medial as well as anterior patella  No defect in the patellar or quadriceps tendon  Calf NT, soft  (-) Humble sign  DF/PF full  Wiggles toes  Sensation intact to light touch   No pitting edema appreciated   NVI  Cap refill < 2 sec    Skin warm to touch, no obvious lesion noted       IMAGING:    XRAY:  FINDINGS:  Right: Severe degenerative changes of the right knee.  There is bone-on-bone apposition and marked hypertrophy in the medial compartment.  Osseous demineralization.  Varus angulation of the knee.     Left: Severe degenerative changes of the right knee. There is bone-on-bone apposition and marked hypertrophy in the medial " compartment. Osseous demineralization. Varus angulation of the knee.     Osseous demineralization.  Atherosclerotic disease.     Impression:     As above.    Kellgren Jacky scale : 4 bilaterally      Assessment:       Encounter Diagnoses   Name Primary?    Primary osteoarthritis of both knees Yes    Chronic pain of both knees     Acquired varus deformity knee, left     Acquired varus deformity knee, right           Plan:       Maryjane was seen today for pain and pain.    Diagnoses and all orders for this visit:    Primary osteoarthritis of both knees  -     Prior authorization Order  -     WALKER FOR HOME USE    Chronic pain of both knees  -     WALKER FOR HOME USE    Acquired varus deformity knee, left  -     WALKER FOR HOME USE    Acquired varus deformity knee, right  -     WALKER FOR HOME USE    Other orders  -     celecoxib (CELEBREX) 200 MG capsule; Take 1 capsule (200 mg total) by mouth 2 (two) times daily. Take with food        Maryjane Mcintyre is a new patient who presents to me today with chief complaint of bilateral knee pain, right greater than left. We had a long discussion today regarding degenerative arthritis in the knees. The patient understands that arthritis is chronic and will worsen over time.  The patient also understands that arthritis may cause episodic flare-ups in pain. Management or if arthritis is achieved through a multi-modal approach including weight loss in obese individuals, activity modification, NSAIDs (topical vs oral) where appropriate, periodic intra-articular steroid injections, viscosupplementation, physical therapy, knee bracing, ambulatory aids, as well as geniculate nerve blocks.  The patient is not a candidate to receive short-acting steroid injections today due to elevated A1c.  Family is looking to hold off on surgical intervention.  She has received gel injections in the past and they do believe these provided relief.  I would like to get her approved for Gelsyn series.   Additionally we discussed genicular nerve blocks in the future.  I would like the patient to resume Celebrex 1 pill twice a day with food.  Additionally I would like to order her medial  brace as well as a Rollator to assist with ambulation.  I would like to see her back in close follow up once authorization has been obtained.  She may call with any questions or concerns in the interim.    Dr. Quiros is aware of the patient & current presentation. He agrees with the current plan above.     Patient verbalized understanding of all instructions and agreed with the above plan.    No follow-ups on file.    The patient understands, chooses and consents to this plan and accepts all   the risks which include but are not limited to the risks mentioned above.     Disclaimer: This note was prepared using a voice recognition system and is likely to have sound alike errors within the text.             [1]   Social History  Socioeconomic History    Marital status:    Tobacco Use    Smoking status: Former     Current packs/day: 1.00     Types: Cigarettes    Smokeless tobacco: Never    Tobacco comments:     Quit 2019 mid year   Substance and Sexual Activity    Alcohol use: No    Drug use: Never    Sexual activity: Not Currently     Social Drivers of Health     Financial Resource Strain: Low Risk  (3/4/2024)    Overall Financial Resource Strain (CARDIA)     Difficulty of Paying Living Expenses: Not very hard   Food Insecurity: No Food Insecurity (3/4/2024)    Hunger Vital Sign     Worried About Running Out of Food in the Last Year: Never true     Ran Out of Food in the Last Year: Never true   Transportation Needs: Unknown (3/4/2024)    PRAPARE - Transportation     Lack of Transportation (Non-Medical): No   Stress: No Stress Concern Present (3/4/2024)    [place] [employer] - Occupational Stress Questionnaire     Feeling of Stress : Not at all   Housing Stability: Low Risk  (3/4/2024)    Housing  Stability Vital Sign     Unable to Pay for Housing in the Last Year: No     Number of Places Lived in the Last Year: 1     Unstable Housing in the Last Year: No

## 2025-05-07 DIAGNOSIS — I15.2 HYPERTENSION ASSOCIATED WITH DIABETES: ICD-10-CM

## 2025-05-07 DIAGNOSIS — E11.59 HYPERTENSION ASSOCIATED WITH DIABETES: ICD-10-CM

## 2025-05-07 RX ORDER — LABETALOL 300 MG/1
300 TABLET, FILM COATED ORAL EVERY 12 HOURS
Qty: 180 TABLET | Refills: 3 | Status: SHIPPED | OUTPATIENT
Start: 2025-05-07

## 2025-05-19 DIAGNOSIS — Z79.4 UNCONTROLLED TYPE 2 DIABETES MELLITUS WITH HYPERGLYCEMIA, WITH LONG-TERM CURRENT USE OF INSULIN: ICD-10-CM

## 2025-05-19 DIAGNOSIS — E11.65 UNCONTROLLED TYPE 2 DIABETES MELLITUS WITH HYPERGLYCEMIA, WITH LONG-TERM CURRENT USE OF INSULIN: ICD-10-CM

## 2025-05-19 NOTE — TELEPHONE ENCOUNTER
Care Due:                  Date            Visit Type   Department     Provider  --------------------------------------------------------------------------------                                [facility]     ON INTERNAL  Last Visit: 03-      FOLLOW UP    MEDICINE       Angelita Dorado  Next Visit: None Scheduled  None         None Found                                                            Last  Test          Frequency    Reason                     Performed    Due Date  --------------------------------------------------------------------------------    Office Visit  15 months..  omeprazole, oxybutynin...  03- 06-    [facility] Embedded Care Due Messages. Reference number: [id number].   5/19/2025 1:35:04 PM CDT

## 2025-05-20 NOTE — TELEPHONE ENCOUNTER
Refill Routing Note   Medication(s) are not appropriate for processing by [facility] for the following reason(s):        Required labs outdated    [facility] action(s):  Defer   Requires appointment : Yes               Appointments  past 12m or future 3m with PCP    Date Provider   Last Visit   9/18/2024 Angelita Dorado DO   Next Visit   Visit date not found Angelita Dorado DO   ED visits in past 90 days: 0        Note composed:10:46 PM 05/19/2025

## 2025-05-21 DIAGNOSIS — E11.9 TYPE 2 DIABETES MELLITUS WITHOUT COMPLICATION: ICD-10-CM

## 2025-05-21 RX ORDER — INSULIN GLARGINE 100 [IU]/ML
16 INJECTION, SOLUTION SUBCUTANEOUS NIGHTLY
Qty: 14.4 ML | Refills: 1 | Status: SHIPPED | OUTPATIENT
Start: 2025-05-21

## 2025-06-11 DIAGNOSIS — K21.9 CHRONIC GERD: ICD-10-CM

## 2025-06-11 DIAGNOSIS — I73.9 PAD (PERIPHERAL ARTERY DISEASE): ICD-10-CM

## 2025-06-11 RX ORDER — CILOSTAZOL 50 MG/1
50 TABLET ORAL 2 TIMES DAILY
Qty: 180 TABLET | Refills: 1 | Status: SHIPPED | OUTPATIENT
Start: 2025-06-11

## 2025-06-11 RX ORDER — OMEPRAZOLE 20 MG/1
20 CAPSULE, DELAYED RELEASE ORAL
Qty: 90 CAPSULE | Refills: 1 | Status: SHIPPED | OUTPATIENT
Start: 2025-06-11

## 2025-06-11 NOTE — TELEPHONE ENCOUNTER
Refill Routing Note   Medication(s) are not appropriate for processing by [facility] for the following reason(s):        Patient not seen by provider within 15 months  Outside of protocol    [facility] action(s):  Defer  Route   Requires labs : Yes             Appointments  past 12m or future 3m with PCP    Date Provider   Last Visit   9/18/2024 Angelita Dorado DO   Next Visit   Visit date not found Angelita Dorado DO   ED visits in past 90 days: 0        Note composed:10:31 AM 06/11/2025

## 2025-06-11 NOTE — TELEPHONE ENCOUNTER
Care Due:                  Date            Visit Type   Department     Provider  --------------------------------------------------------------------------------                                [facility]     ON INTERNAL  Last Visit: 03-      FOLLOW UP    MEDICINE       Angelita Dorado  Next Visit: None Scheduled  None         None Found                                                            Last  Test          Frequency    Reason                     Performed    Due Date  --------------------------------------------------------------------------------    HBA1C.......  6 months...  insulin..................  11- 05-    [facility] Embedded Care Due Messages. Reference number: [id number].   6/11/2025 6:05:06 AM CDT

## 2025-07-02 DIAGNOSIS — I10 PRIMARY HYPERTENSION: Primary | ICD-10-CM

## 2025-07-03 ENCOUNTER — OFFICE VISIT (OUTPATIENT)
Dept: CARDIOLOGY | Facility: CLINIC | Age: 75
End: 2025-07-03
Payer: MEDICARE

## 2025-07-03 ENCOUNTER — HOSPITAL ENCOUNTER (OUTPATIENT)
Dept: CARDIOLOGY | Facility: HOSPITAL | Age: 75
Discharge: HOME | End: 2025-07-03
Attending: INTERNAL MEDICINE
Payer: MEDICARE

## 2025-07-03 VITALS
HEART RATE: 73 BPM | DIASTOLIC BLOOD PRESSURE: 78 MMHG | OXYGEN SATURATION: 98 % | SYSTOLIC BLOOD PRESSURE: 118 MMHG | HEIGHT: 64 IN | BODY MASS INDEX: 25.6 KG/M2 | WEIGHT: 149.94 LBS

## 2025-07-03 DIAGNOSIS — I10 HYPERTENSION GOAL BP (BLOOD PRESSURE) < 130/80: ICD-10-CM

## 2025-07-03 DIAGNOSIS — I65.23 ASYMPTOMATIC BILATERAL CAROTID ARTERY STENOSIS: ICD-10-CM

## 2025-07-03 DIAGNOSIS — I73.9 PAD (PERIPHERAL ARTERY DISEASE): ICD-10-CM

## 2025-07-03 DIAGNOSIS — Z79.4 UNCONTROLLED TYPE 2 DIABETES MELLITUS WITH HYPERGLYCEMIA, WITH LONG-TERM CURRENT USE OF INSULIN: ICD-10-CM

## 2025-07-03 DIAGNOSIS — E78.5 HYPERLIPIDEMIA LDL GOAL <70: ICD-10-CM

## 2025-07-03 DIAGNOSIS — E11.65 UNCONTROLLED TYPE 2 DIABETES MELLITUS WITH HYPERGLYCEMIA, WITH LONG-TERM CURRENT USE OF INSULIN: ICD-10-CM

## 2025-07-03 DIAGNOSIS — I10 PRIMARY HYPERTENSION: ICD-10-CM

## 2025-07-03 DIAGNOSIS — I50.32 CHRONIC DIASTOLIC CONGESTIVE HEART FAILURE: Primary | ICD-10-CM

## 2025-07-03 LAB
OHS QRS DURATION: 86 MS
OHS QTC CALCULATION: 431 MS

## 2025-07-03 PROCEDURE — 1126F AMNT PAIN NOTED NONE PRSNT: CPT | Mod: CPTII,S$GLB,, | Performed by: INTERNAL MEDICINE

## 2025-07-03 PROCEDURE — 93005 ELECTROCARDIOGRAM TRACING: CPT

## 2025-07-03 PROCEDURE — 4010F ACE/ARB THERAPY RXD/TAKEN: CPT | Mod: CPTII,S$GLB,, | Performed by: INTERNAL MEDICINE

## 2025-07-03 PROCEDURE — 93010 ELECTROCARDIOGRAM REPORT: CPT | Mod: ,,, | Performed by: INTERNAL MEDICINE

## 2025-07-03 PROCEDURE — 3078F DIAST BP <80 MM HG: CPT | Mod: CPTII,S$GLB,, | Performed by: INTERNAL MEDICINE

## 2025-07-03 PROCEDURE — 1160F RVW MEDS BY RX/DR IN RCRD: CPT | Mod: CPTII,S$GLB,, | Performed by: INTERNAL MEDICINE

## 2025-07-03 PROCEDURE — 99999 PR PBB SHADOW E&M-EST. PATIENT-LVL III: CPT | Mod: PBBFAC,,, | Performed by: INTERNAL MEDICINE

## 2025-07-03 PROCEDURE — 99214 OFFICE O/P EST MOD 30 MIN: CPT | Mod: S$GLB,,, | Performed by: INTERNAL MEDICINE

## 2025-07-03 PROCEDURE — 3008F BODY MASS INDEX DOCD: CPT | Mod: CPTII,S$GLB,, | Performed by: INTERNAL MEDICINE

## 2025-07-03 PROCEDURE — 3074F SYST BP LT 130 MM HG: CPT | Mod: CPTII,S$GLB,, | Performed by: INTERNAL MEDICINE

## 2025-07-03 PROCEDURE — 1159F MED LIST DOCD IN RCRD: CPT | Mod: CPTII,S$GLB,, | Performed by: INTERNAL MEDICINE

## 2025-07-03 NOTE — PROGRESS NOTES
Subjective:   Patient ID:  Maryjane Mcintyre is a 74 y.o. female who presents for follow up of No chief complaint on file.      75 yo female, routine f/u  PMH HTN, PAD, and HLD. H/o CVA in , DM. S/p cervical fusion procedure after fall. smoking for 50 yrs, quit now. No drinking.   Echo in  normal EF, LAE and LVH  Holter no arrhythmia  ekg today NSR and nonspecific STT change   LE arterial US 50-75% stenosis in the right proximal femoral artery and left popliteal artery. And left Thompson's cyst. +plauqe   DIMITRIOS  Right 0.81 and left 0.87  C/oknee pain, L> R. Occurred at walk. No calf pain and lower back pain.  No leg weakness, resting pain and coldness.  No chest pain, palpitation, faint and orthopnea.  FREDERICK chronic  BP LDL and A1c conreolled     05/23 visit  Last seen in . H/o CVA in  and Rx for DM.  Quit smoking in 2019.  On home PT pending  No chest pain dyspnea syncope. Some feet swelling. Sleeps on 2 pillows  Occasional dizziness and fall   LDL 61 in 02-23 at [facility]  02-23 echo done at [facility]: ef 65% mild TR and negative bubble study    07/24 visit  Walker dependent after CVA in 01.23. no fainting syncope chest pain orthopnea leg swelling  03/24 echo EF nml, AV calcification  03/24 admitted for K 2.1 and 3rd EKG NSR RBBB  Today EKG reviewed by myself today reveals NSR LVH narrow QRS    Interval history  Left shoulder pain constantly  DJD knee limited activity  No orthopnea CP  EKG reviewed by myself today reveals NSR nonspecific STT change          History of Present Illness    CHIEF COMPLAINT:  - Maryjane presents for a regular checkup with complaints of left arm pain.    HPI:  - Reports constant, chronic pain in left shoulder  - Appointment needed to address shoulder issue  - Speculation that pain may be related to arthritis, though formal diagnosis is unclear  - History of stroke affecting ability to walk  - Knee problems limit mobility  - Uses two pillows beneath head at night  -  Reports occasional headaches  - Recent A1C level was 8.4, indicating poor diabetes control  - Has inconsistent eating habits  - Denies chest pain, dizziness, syncope, hematochezia, and current smoking  - Denies having wounds on lower extremities    CARDIAC HISTORY:  - Echo: Normal systolic function, moderate valve calcification and regurgitation (degenerative changes)  - EKG 07/03/2025: NSR, non-specific findings, no significant changes from previous    MEDICATIONS:  - Amlodipine 10 mg daily  - Clopidogrel 75 mg twice daily  - Labetalol 300 mg twice daily  - Losartan 50 mg daily  - Rosuvastatin 40 mg daily  - Jardiance 25 mg daily for diabetes  - Insulin for diabetes    TEST RESULTS:  - LDL cholesterol: Recent, 63 mg/dL, good control  - A1C: Recent, 8.4%, elevated, needs to be reduced to 7%    MEDICAL HISTORY:  - HTN  - High cholesterol  - Arterial stenosis  - Stroke  - Diabetes    SOCIAL HISTORY:  - Smoking: Former smoker, quit          Past Medical History:   Diagnosis Date    Arthritis     Diabetes mellitus, type 2     GERD (gastroesophageal reflux disease)     Gout     Hyperlipidemia     Hypertension     Onychomycosis     PAD (peripheral artery disease)        Past Surgical History:   Procedure Laterality Date    Cardiac Echo      COLONOSCOPY N/A 10/19/2020    Procedure: COLONOSCOPY;  Surgeon: Yousuf Fuller MD;  Location: [facility];  Service: Endoscopy;  Laterality: N/A;    ESOPHAGOGASTRODUODENOSCOPY N/A 10/19/2020    Procedure: ESOPHAGOGASTRODUODENOSCOPY (EGD);  Surgeon: Yousuf Fuller MD;  Location: [facility];  Service: Endoscopy;  Laterality: N/A;    GALLBLADDER SURGERY      HYSTERECTOMY         Social History[1]    Family History   Problem Relation Name Age of Onset    Diabetes Father      Hypertension Father      Heart attack Sister      Colon cancer Sister      Stroke Sister      Colon cancer Brother           ROS    Objective:   Physical Exam  HENT:      Head: Normocephalic.   Eyes:       Pupils: Pupils are equal, round, and reactive to light.   Neck:      Thyroid: No thyromegaly.      Vascular: Normal carotid pulses. No carotid bruit or JVD.   Cardiovascular:      Rate and Rhythm: Normal rate and regular rhythm. No extrasystoles are present.     Chest Wall: PMI is not displaced.      Pulses: Normal pulses.      Heart sounds: Murmur (ESM 2/6 on RUSB and mid LSB) heard.      No gallop. No S3 sounds.   Pulmonary:      Effort: No respiratory distress.      Breath sounds: No stridor.   Abdominal:      General: Bowel sounds are normal.      Palpations: Abdomen is soft.      Tenderness: There is no abdominal tenderness. There is no rebound.   Skin:     Findings: No rash.   Neurological:      Mental Status: She is alert and oriented to person, place, and time.   Psychiatric:         Behavior: Behavior normal.         Lab Results   Component Value Date    CHOL 114 (L) 11/09/2024    CHOL 123 08/15/2024    CHOL 92 (L) 03/02/2024     Lab Results   Component Value Date    HDL 36 (L) 11/09/2024    HDL 46 08/15/2024    HDL 31 (L) 03/02/2024     Lab Results   Component Value Date    LDLCALC 63.2 11/09/2024    LDLCALC 60.8 (L) 08/15/2024    LDLCALC 41.2 (L) 03/02/2024     Lab Results   Component Value Date    TRIG 74 11/09/2024    TRIG 81 08/15/2024    TRIG 99 03/02/2024     Lab Results   Component Value Date    CHOLHDL 31.6 11/09/2024    CHOLHDL 37.4 08/15/2024    CHOLHDL 33.7 03/02/2024       Chemistry        Component Value Date/Time     11/09/2024 0821    K 3.7 11/09/2024 0821     (H) 11/09/2024 0821    CO2 19 (L) 11/09/2024 0821    BUN 19 11/09/2024 0821    CREATININE 1.2 11/09/2024 0821     (H) 11/09/2024 0821        Component Value Date/Time    CALCIUM 9.7 11/09/2024 0821    ALKPHOS 48 11/09/2024 0821    AST 17 11/09/2024 0821    ALT 15 11/09/2024 0821    BILITOT 0.5 11/09/2024 0821    ESTGFRAFRICA 93 02/14/2023 0457    ESTGFRAFRICA >60.0 04/12/2022 1108    EGFRNONAA >60.0 04/12/2022 1108  "         Lab Results   Component Value Date    HGBA1C 8.4 (H) 11/09/2024     No results found for: "TSH"  Lab Results   Component Value Date    INR 1.0 02/07/2023     Lab Results   Component Value Date    WBC 4.76 11/09/2024    HGB 11.4 (L) 11/09/2024    HCT 37.4 11/09/2024    MCV 84 11/09/2024     11/09/2024     BMP  Sodium   Date Value Ref Range Status   11/09/2024 139 136 - 145 mmol/L Final     Potassium   Date Value Ref Range Status   11/09/2024 3.7 3.5 - 5.1 mmol/L Final     Chloride   Date Value Ref Range Status   11/09/2024 112 (H) 95 - 110 mmol/L Final     CO2   Date Value Ref Range Status   11/09/2024 19 (L) 23 - 29 mmol/L Final     BUN   Date Value Ref Range Status   11/09/2024 19 8 - 23 mg/dL Final     Creatinine   Date Value Ref Range Status   11/09/2024 1.2 0.5 - 1.4 mg/dL Final     Calcium   Date Value Ref Range Status   11/09/2024 9.7 8.7 - 10.5 mg/dL Final     Anion Gap   Date Value Ref Range Status   11/09/2024 8 8 - 16 mmol/L Final     eGFR if    Date Value Ref Range Status   04/12/2022 >60.0 >60 mL/min/1.73 m^2 Final     eGFR    Date Value Ref Range Status   02/14/2023 93 mL/min/1.73mSq Final     Comment:     In accordance with NKF-ASN Task Force recommendation, calculation based on the Chronic Kidney Disease Epidemiology Collaboration (CKD-EPI) equation without adjustment for race. eGFR adjusted for gender and age and calculated in ml/min/1.73mSquared. eGFR cannot be calculated if patient is under 18 years of age.     Reference Range:   >= 60 ml/min/1.73mSquared.     eGFR if non    Date Value Ref Range Status   04/12/2022 >60.0 >60 mL/min/1.73 m^2 Final     Comment:     Calculation used to obtain the estimated glomerular filtration  rate (eGFR) is the CKD-EPI equation.        BNP  @LABRCNTIP(BNP,BNPTRIAGEBLO)@  @LABRCNTIP(troponini)@  CrCl cannot be calculated (Patient's most recent lab result is older than the maximum 7 days allowed.).  No " results found in the last 24 hours.  No results found in the last 24 hours.  No results found in the last 24 hours.    Assessment:      1. Chronic diastolic congestive heart failure    2. PAD (peripheral artery disease)    3. Hypertension goal BP (blood pressure) < 130/80    4. Hyperlipidemia LDL goal <70    5. Asymptomatic bilateral carotid artery stenosis    6. Uncontrolled type 2 diabetes mellitus with hyperglycemia, with long-term current use of insulin        Plan:     Assessment & Plan    IMPRESSION:  - Left shoulder pain suspected to be arthritis.  - Normal systolic function with moderate valve calcification and regurgitation noted on recent echocardiogram.  - Suboptimal diabetes control with elevated A1C of 8.4 requires improved management through better diet control and reduced carbohydrate intake.  - Current medication regimen inadequate for diabetes control.    HYPERTENSION:  - Continued amlodipine 10 mg for blood pressure.  - Continued Labetalol 300 mg twice daily for blood pressure/heart rate control.  - Continued Losartan 50 mg daily for blood pressure and diabetes.    PERIPHERAL ARTERY DISEASE:  - Continued Clopidogrel 50 mg twice daily for leg arterial stenosis.    DIABETES MELLITUS TYPE 2:  - Continued Jardiance 25 mg for diabetes.  - Continued insulin.  - Continued Losartan 50 mg daily for blood pressure and diabetes.    HYPERLIPIDEMIA:  - Continued Rosuvastatin 40 mg daily for cholesterol.    FOLLOW-UP:  - Ordered carotid duplex to assess carotid arteries for changes.  - Follow up in 6 months if carotid duplex results are normal.          Carotid US screen  RTC in 6 m    This note was generated with the assistance of ambient listening technology. Verbal consent was obtained by the patient and accompanying visitor(s) for the recording of patient appointment to facilitate this note. I attest to having reviewed and edited the generated note for accuracy, though some syntax or spelling errors may  persist. Please contact the author of this note for any clarification.              [1]   Social History  Tobacco Use    Smoking status: Former     Current packs/day: 1.00     Types: Cigarettes    Smokeless tobacco: Never    Tobacco comments:     Quit 2019 mid year   Substance Use Topics    Alcohol use: No    Drug use: Never

## 2025-07-14 ENCOUNTER — OFFICE VISIT (OUTPATIENT)
Dept: ORTHOPEDICS | Facility: CLINIC | Age: 75
End: 2025-07-14
Payer: MEDICARE

## 2025-07-14 VITALS — HEIGHT: 64 IN | BODY MASS INDEX: 25.44 KG/M2 | WEIGHT: 149 LBS

## 2025-07-14 DIAGNOSIS — M21.161 ACQUIRED VARUS DEFORMITY KNEE, RIGHT: ICD-10-CM

## 2025-07-14 DIAGNOSIS — M17.0 PRIMARY OSTEOARTHRITIS OF BOTH KNEES: Primary | ICD-10-CM

## 2025-07-14 DIAGNOSIS — G89.29 CHRONIC PAIN OF BOTH KNEES: ICD-10-CM

## 2025-07-14 DIAGNOSIS — M25.562 CHRONIC PAIN OF BOTH KNEES: ICD-10-CM

## 2025-07-14 DIAGNOSIS — M21.162 ACQUIRED VARUS DEFORMITY KNEE, LEFT: ICD-10-CM

## 2025-07-14 DIAGNOSIS — M25.561 CHRONIC PAIN OF BOTH KNEES: ICD-10-CM

## 2025-07-14 PROCEDURE — 20610 DRAIN/INJ JOINT/BURSA W/O US: CPT | Mod: 50,S$GLB,, | Performed by: PHYSICIAN ASSISTANT

## 2025-07-14 PROCEDURE — 99999 PR PBB SHADOW E&M-EST. PATIENT-LVL III: CPT | Mod: PBBFAC,,, | Performed by: PHYSICIAN ASSISTANT

## 2025-07-14 PROCEDURE — 99499 UNLISTED E&M SERVICE: CPT | Mod: S$GLB,,, | Performed by: PHYSICIAN ASSISTANT

## 2025-07-14 NOTE — PROCEDURES
Large Joint Aspiration/Injection: bilateral knee    Date/Time: 7/14/2025 7:30 AM    Performed by: Pili Chiu PA  Authorized by: Pili Chiu PA    Consent Done?:  Yes (Verbal)  Indications:  Arthritis  Site marked: the procedure site was marked      Local anesthesia used?: Yes    Local anesthetic:  Topical anesthetic    Details:  Needle Size:  21 G  Approach:  Anterolateral  Location:  Knee  Laterality:  Bilateral  Site:  Bilateral knee  Medications (Right):  16.8 mg sodium hyaluronate 16.8 mg/2 mL  Medications (Left):  16.8 mg sodium hyaluronate 16.8 mg/2 mL  Patient tolerance:  Patient tolerated the procedure well with no immediate complications     Verbal consent was obtained  The patient's ID, site, side was verified  The site was sterile prepped in standard fashion  The injection was performed in the ashly-lateral side without complication  A sterile Band-Aid was applied    Patient was directed to apply ice today at roughly 15 minutes at a time as needed.     If steroid was received today:   It was discussed that they may be sore for the next few days or week   Please avoid strenuous activity over the next 24 hours.   Effectiveness may take 1-2 weeks to show results especially with slow release medication  It was also discussed that the patient may have a increase in glucose if diabetic and should monitor levels.  Patient may also have a rise in blood pressure, monitor symptoms     If gel was received today:  Patient may be sore for 14 days with intermittent swelling  Avoid heavy activity, elevate and ice as need  This will take up to 8 weeks to show full effectiveness  Patient was instructed to call as needed.

## 2025-07-21 ENCOUNTER — PROCEDURE VISIT (OUTPATIENT)
Dept: ORTHOPEDICS | Facility: CLINIC | Age: 75
End: 2025-07-21
Payer: MEDICARE

## 2025-07-21 VITALS — WEIGHT: 149.06 LBS | HEIGHT: 64 IN | BODY MASS INDEX: 25.45 KG/M2

## 2025-07-21 DIAGNOSIS — M25.562 CHRONIC PAIN OF BOTH KNEES: ICD-10-CM

## 2025-07-21 DIAGNOSIS — M17.0 PRIMARY OSTEOARTHRITIS OF BOTH KNEES: Primary | ICD-10-CM

## 2025-07-21 DIAGNOSIS — M25.561 CHRONIC PAIN OF BOTH KNEES: ICD-10-CM

## 2025-07-21 DIAGNOSIS — G89.29 CHRONIC PAIN OF BOTH KNEES: ICD-10-CM

## 2025-07-21 PROCEDURE — 20610 DRAIN/INJ JOINT/BURSA W/O US: CPT | Mod: 50,S$GLB,, | Performed by: PHYSICIAN ASSISTANT

## 2025-07-21 PROCEDURE — 99024 POSTOP FOLLOW-UP VISIT: CPT | Mod: S$GLB,,, | Performed by: PHYSICIAN ASSISTANT

## 2025-07-21 NOTE — PROCEDURES
Large Joint Aspiration/Injection: bilateral knee    Date/Time: 7/21/2025 8:45 AM    Performed by: Pili Chiu PA  Authorized by: Pili Chiu PA    Indications:  Arthritis, joint swelling and pain  Site marked: the procedure site was marked      Local anesthesia used?: Yes    Local anesthetic:  Topical anesthetic    Details:  Needle Size:  21 G  Approach:  Anterolateral  Location:  Knee  Laterality:  Bilateral  Site:  Bilateral knee  Medications (Right):  16.8 mg sodium hyaluronate 16.8 mg/2 mL  Medications (Left):  16.8 mg sodium hyaluronate 16.8 mg/2 mL  Patient tolerance:  Patient tolerated the procedure well with no immediate complications     Verbal consent was obtained  The patient's ID, site, side was verified  The site was sterile prepped in standard fashion  The injection was performed in the ashly-lateral side without complication  A sterile Band-Aid was applied    Patient was directed to apply ice today at roughly 15 minutes at a time as needed.     If steroid was received today:   It was discussed that they may be sore for the next few days or week   Please avoid strenuous activity over the next 24 hours.   Effectiveness may take 1-2 weeks to show results especially with slow release medication  It was also discussed that the patient may have a increase in glucose if diabetic and should monitor levels.  Patient may also have a rise in blood pressure, monitor symptoms     If gel was received today:  Patient may be sore for 14 days with intermittent swelling  Avoid heavy activity, elevate and ice as need  This will take up to 8 weeks to show full effectiveness  Patient was instructed to call as needed.

## 2025-07-28 ENCOUNTER — OFFICE VISIT (OUTPATIENT)
Dept: ORTHOPEDICS | Facility: CLINIC | Age: 75
End: 2025-07-28
Payer: MEDICARE

## 2025-07-28 VITALS — BODY MASS INDEX: 25.44 KG/M2 | WEIGHT: 149 LBS | HEIGHT: 64 IN

## 2025-07-28 DIAGNOSIS — M25.561 CHRONIC PAIN OF BOTH KNEES: ICD-10-CM

## 2025-07-28 DIAGNOSIS — G89.29 CHRONIC PAIN OF BOTH KNEES: ICD-10-CM

## 2025-07-28 DIAGNOSIS — M17.0 PRIMARY OSTEOARTHRITIS OF BOTH KNEES: Primary | ICD-10-CM

## 2025-07-28 DIAGNOSIS — M25.562 CHRONIC PAIN OF BOTH KNEES: ICD-10-CM

## 2025-07-28 PROCEDURE — 99499 UNLISTED E&M SERVICE: CPT | Mod: S$GLB,,, | Performed by: PHYSICIAN ASSISTANT

## 2025-07-28 PROCEDURE — 99999 PR PBB SHADOW E&M-EST. PATIENT-LVL III: CPT | Mod: PBBFAC,,, | Performed by: PHYSICIAN ASSISTANT

## 2025-07-28 PROCEDURE — 20610 DRAIN/INJ JOINT/BURSA W/O US: CPT | Mod: 50,S$GLB,, | Performed by: PHYSICIAN ASSISTANT

## 2025-07-28 RX ORDER — CELECOXIB 200 MG/1
200 CAPSULE ORAL 2 TIMES DAILY
Qty: 60 CAPSULE | Refills: 0 | Status: SHIPPED | OUTPATIENT
Start: 2025-07-28

## 2025-07-28 NOTE — PROCEDURES
Large Joint Aspiration/Injection: bilateral knee    Date/Time: 7/28/2025 8:00 AM    Performed by: Pili Chiu PA  Authorized by: Pili Chiu PA    Site marked: the procedure site was marked      Local anesthesia used?: Yes    Local anesthetic:  Topical anesthetic    Details:  Needle Size:  21 G  Approach:  Anterolateral  Location:  Knee  Laterality:  Bilateral  Site:  Bilateral knee  Medications (Right):  16.8 mg sodium hyaluronate 16.8 mg/2 mL  Medications (Left):  16.8 mg sodium hyaluronate 16.8 mg/2 mL  Patient tolerance:  Patient tolerated the procedure well with no immediate complications     Verbal consent was obtained  The patient's ID, site, side was verified  The site was sterile prepped in standard fashion  The injection was performed in the ashly-lateral side without complication  A sterile Band-Aid was applied    Patient was directed to apply ice today at roughly 15 minutes at a time as needed.     If steroid was received today:   It was discussed that they may be sore for the next few days or week   Please avoid strenuous activity over the next 24 hours.   Effectiveness may take 1-2 weeks to show results especially with slow release medication  It was also discussed that the patient may have a increase in glucose if diabetic and should monitor levels.  Patient may also have a rise in blood pressure, monitor symptoms     If gel was received today:  Patient may be sore for 14 days with intermittent swelling  Avoid heavy activity, elevate and ice as need  This will take up to 8 weeks to show full effectiveness  Patient was instructed to call as needed.

## 2025-08-05 ENCOUNTER — TELEPHONE (OUTPATIENT)
Dept: INTERNAL MEDICINE | Facility: CLINIC | Age: 75
End: 2025-08-05
Payer: MEDICARE

## 2025-08-05 NOTE — TELEPHONE ENCOUNTER
Copied from CRM #[id number]. Topic: General Inquiry - Return Call  >> Aug 5, 2025  8:53 AM Kenan wrote:  .Type:  Patient Returning Call    Who Called:daughter  Who Left Message for Patient:  Does the patient know what this is regarding?:yes  Would the patient rather a call back or a response via [employer]? Call back  Best Call Back Number:[phone] / .[phone]  Additional Information: pt daughter is returning a call

## 2025-08-06 ENCOUNTER — OFFICE VISIT (OUTPATIENT)
Dept: INTERNAL MEDICINE | Facility: CLINIC | Age: 75
End: 2025-08-06
Payer: MEDICARE

## 2025-08-06 VITALS
HEART RATE: 78 BPM | SYSTOLIC BLOOD PRESSURE: 130 MMHG | WEIGHT: 160.06 LBS | BODY MASS INDEX: 27.33 KG/M2 | DIASTOLIC BLOOD PRESSURE: 64 MMHG | OXYGEN SATURATION: 99 % | RESPIRATION RATE: 20 BRPM | HEIGHT: 64 IN | TEMPERATURE: 96 F

## 2025-08-06 DIAGNOSIS — Z79.4 UNCONTROLLED TYPE 2 DIABETES MELLITUS WITH HYPERGLYCEMIA, WITH LONG-TERM CURRENT USE OF INSULIN: ICD-10-CM

## 2025-08-06 DIAGNOSIS — E78.5 HYPERLIPIDEMIA LDL GOAL <70: Primary | ICD-10-CM

## 2025-08-06 DIAGNOSIS — E11.65 UNCONTROLLED TYPE 2 DIABETES MELLITUS WITH HYPERGLYCEMIA, WITH LONG-TERM CURRENT USE OF INSULIN: ICD-10-CM

## 2025-08-06 DIAGNOSIS — I10 HYPERTENSION GOAL BP (BLOOD PRESSURE) < 130/80: ICD-10-CM

## 2025-08-06 PROCEDURE — 1160F RVW MEDS BY RX/DR IN RCRD: CPT | Mod: CPTII,S$GLB,, | Performed by: PHYSICIAN ASSISTANT

## 2025-08-06 PROCEDURE — 99999 PR PBB SHADOW E&M-EST. PATIENT-LVL V: CPT | Mod: PBBFAC,,, | Performed by: PHYSICIAN ASSISTANT

## 2025-08-06 PROCEDURE — 3078F DIAST BP <80 MM HG: CPT | Mod: CPTII,S$GLB,, | Performed by: PHYSICIAN ASSISTANT

## 2025-08-06 PROCEDURE — 3288F FALL RISK ASSESSMENT DOCD: CPT | Mod: CPTII,S$GLB,, | Performed by: PHYSICIAN ASSISTANT

## 2025-08-06 PROCEDURE — 3008F BODY MASS INDEX DOCD: CPT | Mod: CPTII,S$GLB,, | Performed by: PHYSICIAN ASSISTANT

## 2025-08-06 PROCEDURE — 3075F SYST BP GE 130 - 139MM HG: CPT | Mod: CPTII,S$GLB,, | Performed by: PHYSICIAN ASSISTANT

## 2025-08-06 PROCEDURE — 4010F ACE/ARB THERAPY RXD/TAKEN: CPT | Mod: CPTII,S$GLB,, | Performed by: PHYSICIAN ASSISTANT

## 2025-08-06 PROCEDURE — G2211 COMPLEX E/M VISIT ADD ON: HCPCS | Mod: S$GLB,,, | Performed by: PHYSICIAN ASSISTANT

## 2025-08-06 PROCEDURE — 1101F PT FALLS ASSESS-DOCD LE1/YR: CPT | Mod: CPTII,S$GLB,, | Performed by: PHYSICIAN ASSISTANT

## 2025-08-06 PROCEDURE — 1159F MED LIST DOCD IN RCRD: CPT | Mod: CPTII,S$GLB,, | Performed by: PHYSICIAN ASSISTANT

## 2025-08-06 PROCEDURE — 1126F AMNT PAIN NOTED NONE PRSNT: CPT | Mod: CPTII,S$GLB,, | Performed by: PHYSICIAN ASSISTANT

## 2025-08-06 PROCEDURE — 99214 OFFICE O/P EST MOD 30 MIN: CPT | Mod: S$GLB,,, | Performed by: PHYSICIAN ASSISTANT

## 2025-08-06 PROCEDURE — 2023F DILAT RTA XM W/O RTNOPTHY: CPT | Mod: CPTII,S$GLB,, | Performed by: PHYSICIAN ASSISTANT

## 2025-08-06 NOTE — LETTER
August 6, 2025      [facility]  [address] [place] [address] [address] [address]  Phone: [phone]  Fax: [phone]       Patient: Maryjane Mcintyre   YOB: 1950  Date of Visit: 08/06/2025    To Whom It May Concern:    Daiana Mcintyre  was at [facility] on 08/06/2025. The patient may return to work/school on 8/6/25 with no restrictions. If you have any questions or concerns, or if I can be of further assistance, please do not hesitate to contact me.    Sincerely,    Mag Fraire PA-C

## 2025-08-06 NOTE — PROGRESS NOTES
"Subjective:      Patient ID: Maryjane Mcintyre is a 75 y.o. female.    Chief Complaint: Annual Exam  Patient is known to me, being seen today for annual exam.     HTN- labetalol 300mg bid, losartan 50mg, amlodipine 10mg  HLD- on statin therapy   DM- A1c 8.4%, tradjenta 5mg, jardiance 25mg, lantus 16 units   Sugar elevated at home, yesterday 230s    Due for labs     Present with daughter, requests paperwork completion   Daughter requesting intermittent leave for mother's appts   Works at [employer], generally hours 8-4:30     Last visit Nov 2024 w myself.    HPI  Review of Systems   Constitutional:  Negative for chills, diaphoresis and fever.   HENT:  Negative for congestion, rhinorrhea and sore throat.    Respiratory:  Negative for cough, shortness of breath and wheezing.    Gastrointestinal:  Negative for abdominal pain, constipation, diarrhea, nausea and vomiting.   Skin:  Negative for rash.   Neurological:  Negative for dizziness, light-headedness and headaches.       Objective:   /64   Pulse 78   Temp 96.2 °F (35.7 °C) (Tympanic)   Resp 20   Ht 5' 4" (1.626 m)   Wt 72.6 kg (160 lb 0.9 oz)   SpO2 99%   BMI 27.47 kg/m²   Physical Exam  Constitutional:       General: She is not in acute distress.     Appearance: She is well-developed. She is not ill-appearing or diaphoretic.   HENT:      Head: Normocephalic and atraumatic.      Right Ear: External ear normal.      Left Ear: External ear normal.   Eyes:      General: Lids are normal.         Right eye: No discharge.         Left eye: No discharge.      Conjunctiva/sclera: Conjunctivae normal.      Right eye: Right conjunctiva is not injected.      Left eye: Left conjunctiva is not injected.   Cardiovascular:      Pulses:           Dorsalis pedis pulses are 1+ on the right side and 1+ on the left side.   Pulmonary:      Effort: Pulmonary effort is normal. No respiratory distress.   Feet:      Right foot:      Protective Sensation: 5 sites tested.  5 " sites sensed.      Skin integrity: Skin integrity normal.      Toenail Condition: Right toenails are abnormally thick. Fungal disease present.     Left foot:      Protective Sensation: 5 sites tested.  5 sites sensed.      Skin integrity: Skin integrity normal.      Toenail Condition: Left toenails are abnormally thick. Fungal disease present.  Skin:     General: Skin is warm and dry.      Findings: No rash.   Neurological:      Mental Status: She is alert and oriented to person, place, and time.   Psychiatric:         Speech: Speech normal.         Behavior: Behavior normal.         Thought Content: Thought content normal.         Judgment: Judgment normal.       Assessment:      1. Hyperlipidemia LDL goal <70    2. Hypertension goal BP (blood pressure) < 130/80    3. Uncontrolled type 2 diabetes mellitus with hyperglycemia, with long-term current use of insulin       Plan:   Hyperlipidemia LDL goal <70  -     Lipid Panel; Future; Expected date: 08/06/2025    Hypertension goal BP (blood pressure) < 130/80  -     CBC Auto Differential; Future; Expected date: 08/06/2025  -     Comprehensive Metabolic Panel; Future; Expected date: 08/06/2025    Uncontrolled type 2 diabetes mellitus with hyperglycemia, with long-term current use of insulin  -     Hemoglobin A1C; Future; Expected date: 08/06/2025      Fasting labs, med changes pending results     Forms completed for [email or web address] leave x1yr, 4 visits/month 2-4hrs/visit     3mth f/u

## 2025-08-11 ENCOUNTER — TELEPHONE (OUTPATIENT)
Dept: ORTHOPEDICS | Facility: CLINIC | Age: 75
End: 2025-08-11
Payer: MEDICARE

## 2025-08-16 ENCOUNTER — LAB VISIT (OUTPATIENT)
Dept: LAB | Facility: HOSPITAL | Age: 75
End: 2025-08-16
Attending: INTERNAL MEDICINE
Payer: MEDICARE

## 2025-08-16 DIAGNOSIS — E11.9 TYPE 2 DIABETES MELLITUS WITHOUT COMPLICATION: ICD-10-CM

## 2025-08-16 DIAGNOSIS — Z79.4 UNCONTROLLED TYPE 2 DIABETES MELLITUS WITH HYPERGLYCEMIA, WITH LONG-TERM CURRENT USE OF INSULIN: ICD-10-CM

## 2025-08-16 DIAGNOSIS — I10 HYPERTENSION GOAL BP (BLOOD PRESSURE) < 130/80: ICD-10-CM

## 2025-08-16 DIAGNOSIS — E78.5 HYPERLIPIDEMIA LDL GOAL <70: ICD-10-CM

## 2025-08-16 DIAGNOSIS — E11.65 UNCONTROLLED TYPE 2 DIABETES MELLITUS WITH HYPERGLYCEMIA, WITH LONG-TERM CURRENT USE OF INSULIN: ICD-10-CM

## 2025-08-16 LAB
ABSOLUTE EOSINOPHIL (OHS): 0.28 K/UL
ABSOLUTE MONOCYTE (OHS): 0.47 K/UL (ref 0.3–1)
ABSOLUTE NEUTROPHIL COUNT (OHS): 2.86 K/UL (ref 1.8–7.7)
ALBUMIN SERPL BCP-MCNC: 4.2 G/DL (ref 3.5–5.2)
ALP SERPL-CCNC: 49 UNIT/L (ref 40–150)
ALT SERPL W/O P-5'-P-CCNC: 20 UNIT/L (ref 0–55)
ANION GAP (OHS): 9 MMOL/L (ref 8–16)
AST SERPL-CCNC: 18 UNIT/L (ref 0–50)
BASOPHILS # BLD AUTO: 0.06 K/UL
BASOPHILS NFR BLD AUTO: 1.1 %
BILIRUB SERPL-MCNC: 0.7 MG/DL (ref 0.1–1)
BUN SERPL-MCNC: 14 MG/DL (ref 8–23)
CALCIUM SERPL-MCNC: 9.7 MG/DL (ref 8.7–10.5)
CHLORIDE SERPL-SCNC: 114 MMOL/L (ref 95–110)
CHOLEST SERPL-MCNC: 108 MG/DL (ref 120–199)
CHOLEST/HDLC SERPL: 2.7 {RATIO} (ref 2–5)
CO2 SERPL-SCNC: 19 MMOL/L (ref 23–29)
CREAT SERPL-MCNC: 1 MG/DL (ref 0.5–1.4)
EAG (OHS): 214 MG/DL (ref 68–131)
ERYTHROCYTE [DISTWIDTH] IN BLOOD BY AUTOMATED COUNT: 16.7 % (ref 11.5–14.5)
GFR SERPLBLD CREATININE-BSD FMLA CKD-EPI: 59 ML/MIN/1.73/M2
GLUCOSE SERPL-MCNC: 140 MG/DL (ref 70–110)
HBA1C MFR BLD: 9.1 % (ref 4–5.6)
HCT VFR BLD AUTO: 35.3 % (ref 37–48.5)
HDLC SERPL-MCNC: 40 MG/DL (ref 40–75)
HDLC SERPL: 37 % (ref 20–50)
HGB BLD-MCNC: 11.4 GM/DL (ref 12–16)
IMM GRANULOCYTES # BLD AUTO: 0.01 K/UL (ref 0–0.04)
IMM GRANULOCYTES NFR BLD AUTO: 0.2 % (ref 0–0.5)
LDLC SERPL CALC-MCNC: 51.6 MG/DL (ref 63–159)
LYMPHOCYTES # BLD AUTO: 1.54 K/UL (ref 1–4.8)
MCH RBC QN AUTO: 26 PG (ref 27–31)
MCHC RBC AUTO-ENTMCNC: 32.3 G/DL (ref 32–36)
MCV RBC AUTO: 81 FL (ref 82–98)
NONHDLC SERPL-MCNC: 68 MG/DL
NUCLEATED RBC (/100WBC) (OHS): 0 /100 WBC
PLATELET # BLD AUTO: 169 K/UL (ref 150–450)
PMV BLD AUTO: 11.4 FL (ref 9.2–12.9)
POTASSIUM SERPL-SCNC: 3.6 MMOL/L (ref 3.5–5.1)
PROT SERPL-MCNC: 7.3 GM/DL (ref 6–8.4)
RBC # BLD AUTO: 4.38 M/UL (ref 4–5.4)
RELATIVE EOSINOPHIL (OHS): 5.4 %
RELATIVE LYMPHOCYTE (OHS): 29.5 % (ref 18–48)
RELATIVE MONOCYTE (OHS): 9 % (ref 4–15)
RELATIVE NEUTROPHIL (OHS): 54.8 % (ref 38–73)
SODIUM SERPL-SCNC: 142 MMOL/L (ref 136–145)
TRIGL SERPL-MCNC: 82 MG/DL (ref 30–150)
WBC # BLD AUTO: 5.22 K/UL (ref 3.9–12.7)

## 2025-08-16 PROCEDURE — 80053 COMPREHEN METABOLIC PANEL: CPT

## 2025-08-16 PROCEDURE — 36415 COLL VENOUS BLD VENIPUNCTURE: CPT

## 2025-08-16 PROCEDURE — 83036 HEMOGLOBIN GLYCOSYLATED A1C: CPT

## 2025-08-16 PROCEDURE — 80061 LIPID PANEL: CPT

## 2025-08-16 PROCEDURE — 85025 COMPLETE CBC W/AUTO DIFF WBC: CPT

## 2025-08-20 ENCOUNTER — RESULTS FOLLOW-UP (OUTPATIENT)
Dept: INTERNAL MEDICINE | Facility: CLINIC | Age: 75
End: 2025-08-20
Payer: MEDICARE

## 2025-08-20 DIAGNOSIS — E11.65 UNCONTROLLED TYPE 2 DIABETES MELLITUS WITH HYPERGLYCEMIA, WITH LONG-TERM CURRENT USE OF INSULIN: ICD-10-CM

## 2025-08-20 DIAGNOSIS — Z79.4 UNCONTROLLED TYPE 2 DIABETES MELLITUS WITH HYPERGLYCEMIA, WITH LONG-TERM CURRENT USE OF INSULIN: ICD-10-CM

## 2025-08-20 RX ORDER — INSULIN GLARGINE 100 [IU]/ML
20 INJECTION, SOLUTION SUBCUTANEOUS NIGHTLY
Qty: 18 ML | Refills: 1 | Status: SHIPPED | OUTPATIENT
Start: 2025-08-20

## 2025-08-26 DIAGNOSIS — E11.65 UNCONTROLLED TYPE 2 DIABETES MELLITUS WITH HYPERGLYCEMIA, WITH LONG-TERM CURRENT USE OF INSULIN: Primary | ICD-10-CM

## 2025-08-26 DIAGNOSIS — Z79.4 UNCONTROLLED TYPE 2 DIABETES MELLITUS WITH HYPERGLYCEMIA, WITH LONG-TERM CURRENT USE OF INSULIN: Primary | ICD-10-CM

## 2025-08-26 RX ORDER — CELECOXIB 200 MG/1
200 CAPSULE ORAL 2 TIMES DAILY
Qty: 60 CAPSULE | Refills: 0 | Status: SHIPPED | OUTPATIENT
Start: 2025-08-26

## 2025-08-27 RX ORDER — PEN NEEDLE, DIABETIC 30 GX3/16"
1 NEEDLE, DISPOSABLE MISCELLANEOUS 2 TIMES DAILY
Qty: 200 EACH | Refills: 3 | Status: SHIPPED | OUTPATIENT
Start: 2025-08-27

## 2025-08-29 ENCOUNTER — TELEPHONE (OUTPATIENT)
Dept: ORTHOPEDICS | Facility: CLINIC | Age: 75
End: 2025-08-29
Payer: MEDICARE

## 2025-08-29 ENCOUNTER — TELEPHONE (OUTPATIENT)
Dept: INTERNAL MEDICINE | Facility: CLINIC | Age: 75
End: 2025-08-29
Payer: MEDICARE